# Patient Record
Sex: FEMALE | Race: WHITE | NOT HISPANIC OR LATINO | Employment: FULL TIME | URBAN - METROPOLITAN AREA
[De-identification: names, ages, dates, MRNs, and addresses within clinical notes are randomized per-mention and may not be internally consistent; named-entity substitution may affect disease eponyms.]

---

## 2017-02-08 ENCOUNTER — GENERIC CONVERSION - ENCOUNTER (OUTPATIENT)
Dept: OTHER | Facility: OTHER | Age: 57
End: 2017-02-08

## 2017-03-08 ENCOUNTER — ALLSCRIPTS OFFICE VISIT (OUTPATIENT)
Dept: OTHER | Facility: OTHER | Age: 57
End: 2017-03-08

## 2017-07-07 ENCOUNTER — LAB CONVERSION - ENCOUNTER (OUTPATIENT)
Dept: OTHER | Facility: OTHER | Age: 57
End: 2017-07-07

## 2017-07-07 LAB
A/G RATIO (HISTORICAL): 1.5 (ref 1.2–2.2)
ALBUMIN SERPL BCP-MCNC: 4 G/DL (ref 3.5–5.5)
ALP SERPL-CCNC: 59 IU/L (ref 39–117)
ALT SERPL W P-5'-P-CCNC: 21 IU/L (ref 0–32)
AST SERPL W P-5'-P-CCNC: 23 IU/L (ref 0–40)
BILIRUB SERPL-MCNC: <0.2 MG/DL (ref 0–1.2)
BUN SERPL-MCNC: 13 MG/DL (ref 6–24)
BUN/CREA RATIO (HISTORICAL): 17 (ref 9–23)
CALCIUM SERPL-MCNC: 9.2 MG/DL (ref 8.7–10.2)
CHLORIDE SERPL-SCNC: 103 MMOL/L (ref 96–106)
CHOLEST SERPL-MCNC: 224 MG/DL (ref 100–199)
CO2 SERPL-SCNC: 22 MMOL/L (ref 18–29)
CREAT SERPL-MCNC: 0.78 MG/DL (ref 0.57–1)
EGFR AFRICAN AMERICAN (HISTORICAL): 98 ML/MIN/1.73
EGFR-AMERICAN CALC (HISTORICAL): 85 ML/MIN/1.73
GLUCOSE SERPL-MCNC: 110 MG/DL (ref 65–99)
HDLC SERPL-MCNC: 61 MG/DL
LDLC SERPL CALC-MCNC: 139 MG/DL (ref 0–99)
POTASSIUM SERPL-SCNC: 4.2 MMOL/L (ref 3.5–5.2)
SODIUM SERPL-SCNC: 141 MMOL/L (ref 134–144)
TOT. GLOBULIN, SERUM (HISTORICAL): 2.6 G/DL (ref 1.5–4.5)
TOTAL PROTEIN (HISTORICAL): 6.6 G/DL (ref 6–8.5)
TRIGL SERPL-MCNC: 119 MG/DL (ref 0–149)
VLDLC SERPL CALC-MCNC: 24 MG/DL (ref 5–40)

## 2017-07-08 ENCOUNTER — GENERIC CONVERSION - ENCOUNTER (OUTPATIENT)
Dept: OTHER | Facility: OTHER | Age: 57
End: 2017-07-08

## 2017-07-08 LAB — TSH SERPL DL<=0.05 MIU/L-ACNC: 3.42 UIU/ML (ref 0.45–4.5)

## 2017-07-13 ENCOUNTER — ALLSCRIPTS OFFICE VISIT (OUTPATIENT)
Dept: OTHER | Facility: OTHER | Age: 57
End: 2017-07-13

## 2017-07-13 DIAGNOSIS — Z12.31 ENCOUNTER FOR SCREENING MAMMOGRAM FOR MALIGNANT NEOPLASM OF BREAST: ICD-10-CM

## 2017-07-28 ENCOUNTER — GENERIC CONVERSION - ENCOUNTER (OUTPATIENT)
Dept: OTHER | Facility: OTHER | Age: 57
End: 2017-07-28

## 2017-08-11 ENCOUNTER — HOSPITAL ENCOUNTER (EMERGENCY)
Facility: HOSPITAL | Age: 57
Discharge: HOME | End: 2017-08-11
Attending: EMERGENCY MEDICINE | Admitting: EMERGENCY MEDICINE
Payer: COMMERCIAL

## 2017-08-11 VITALS
BODY MASS INDEX: 35.61 KG/M2 | RESPIRATION RATE: 16 BRPM | HEIGHT: 63 IN | WEIGHT: 201 LBS | HEART RATE: 74 BPM | DIASTOLIC BLOOD PRESSURE: 90 MMHG | OXYGEN SATURATION: 99 % | TEMPERATURE: 98.4 F | SYSTOLIC BLOOD PRESSURE: 147 MMHG

## 2017-08-11 DIAGNOSIS — K21.9 GERD (GASTROESOPHAGEAL REFLUX DISEASE): Primary | ICD-10-CM

## 2017-08-11 DIAGNOSIS — I10 ELEVATED BLOOD PRESSURE READING WITH DIAGNOSIS OF HYPERTENSION: ICD-10-CM

## 2017-08-11 LAB
ALBUMIN SERPL BCP-MCNC: 3.4 G/DL (ref 3.5–5)
ALP SERPL-CCNC: 65 U/L (ref 46–116)
ALT SERPL W P-5'-P-CCNC: 33 U/L (ref 12–78)
ANION GAP SERPL CALCULATED.3IONS-SCNC: 12 MMOL/L (ref 4–13)
AST SERPL W P-5'-P-CCNC: 28 U/L (ref 5–45)
BASOPHILS # BLD AUTO: 0.03 THOUSANDS/ΜL (ref 0–0.1)
BASOPHILS NFR BLD AUTO: 0 % (ref 0–1)
BILIRUB SERPL-MCNC: 0.3 MG/DL (ref 0.2–1)
BUN SERPL-MCNC: 15 MG/DL (ref 5–25)
CALCIUM SERPL-MCNC: 9 MG/DL (ref 8.3–10.1)
CHLORIDE SERPL-SCNC: 106 MMOL/L (ref 100–108)
CO2 SERPL-SCNC: 23 MMOL/L (ref 21–32)
CREAT SERPL-MCNC: 0.86 MG/DL (ref 0.6–1.3)
EOSINOPHIL # BLD AUTO: 0.16 THOUSAND/ΜL (ref 0–0.61)
EOSINOPHIL NFR BLD AUTO: 2 % (ref 0–6)
ERYTHROCYTE [DISTWIDTH] IN BLOOD BY AUTOMATED COUNT: 14.7 % (ref 11.6–15.1)
GFR SERPL CREATININE-BSD FRML MDRD: 76 ML/MIN/1.73SQ M
GLUCOSE SERPL-MCNC: 125 MG/DL (ref 65–140)
HCT VFR BLD AUTO: 38.2 % (ref 34.8–46.1)
HGB BLD-MCNC: 12.4 G/DL (ref 11.5–15.4)
LIPASE SERPL-CCNC: 187 U/L (ref 73–393)
LYMPHOCYTES # BLD AUTO: 1.34 THOUSANDS/ΜL (ref 0.6–4.47)
LYMPHOCYTES NFR BLD AUTO: 18 % (ref 14–44)
MCH RBC QN AUTO: 26.2 PG (ref 26.8–34.3)
MCHC RBC AUTO-ENTMCNC: 32.5 G/DL (ref 31.4–37.4)
MCV RBC AUTO: 81 FL (ref 82–98)
MONOCYTES # BLD AUTO: 0.51 THOUSAND/ΜL (ref 0.17–1.22)
MONOCYTES NFR BLD AUTO: 7 % (ref 4–12)
NEUTROPHILS # BLD AUTO: 5.29 THOUSANDS/ΜL (ref 1.85–7.62)
NEUTS SEG NFR BLD AUTO: 73 % (ref 43–75)
PLATELET # BLD AUTO: 235 THOUSANDS/UL (ref 149–390)
PMV BLD AUTO: 9.5 FL (ref 8.9–12.7)
POTASSIUM SERPL-SCNC: 3.8 MMOL/L (ref 3.5–5.3)
PROT SERPL-MCNC: 7.5 G/DL (ref 6.4–8.2)
RBC # BLD AUTO: 4.73 MILLION/UL (ref 3.81–5.12)
SODIUM SERPL-SCNC: 141 MMOL/L (ref 136–145)
WBC # BLD AUTO: 7.33 THOUSAND/UL (ref 4.31–10.16)

## 2017-08-11 PROCEDURE — 96374 THER/PROPH/DIAG INJ IV PUSH: CPT

## 2017-08-11 PROCEDURE — 96361 HYDRATE IV INFUSION ADD-ON: CPT

## 2017-08-11 PROCEDURE — 80053 COMPREHEN METABOLIC PANEL: CPT | Performed by: EMERGENCY MEDICINE

## 2017-08-11 PROCEDURE — 99283 EMERGENCY DEPT VISIT LOW MDM: CPT

## 2017-08-11 PROCEDURE — 96375 TX/PRO/DX INJ NEW DRUG ADDON: CPT

## 2017-08-11 PROCEDURE — 83690 ASSAY OF LIPASE: CPT | Performed by: EMERGENCY MEDICINE

## 2017-08-11 PROCEDURE — C9113 INJ PANTOPRAZOLE SODIUM, VIA: HCPCS | Performed by: EMERGENCY MEDICINE

## 2017-08-11 PROCEDURE — 36415 COLL VENOUS BLD VENIPUNCTURE: CPT | Performed by: EMERGENCY MEDICINE

## 2017-08-11 PROCEDURE — 85025 COMPLETE CBC W/AUTO DIFF WBC: CPT | Performed by: EMERGENCY MEDICINE

## 2017-08-11 RX ORDER — LEVOTHYROXINE SODIUM 137 UG/1
137 TABLET ORAL
COMMUNITY
Start: 2015-11-06 | End: 2018-07-23

## 2017-08-11 RX ORDER — ALBUTEROL SULFATE 90 UG/1
108 AEROSOL, METERED RESPIRATORY (INHALATION) AS NEEDED
COMMUNITY
Start: 2016-02-01 | End: 2019-12-03 | Stop reason: SDUPTHER

## 2017-08-11 RX ORDER — ENALAPRIL MALEATE 10 MG/1
10 TABLET ORAL DAILY
COMMUNITY
End: 2018-07-23 | Stop reason: SDUPTHER

## 2017-08-11 RX ORDER — LABETALOL HYDROCHLORIDE 5 MG/ML
20 INJECTION, SOLUTION INTRAVENOUS ONCE
Status: COMPLETED | OUTPATIENT
Start: 2017-08-11 | End: 2017-08-11

## 2017-08-11 RX ORDER — METOPROLOL SUCCINATE 25 MG/1
50 TABLET, EXTENDED RELEASE ORAL
COMMUNITY
Start: 2015-11-06 | End: 2018-07-23 | Stop reason: SDUPTHER

## 2017-08-11 RX ORDER — PANTOPRAZOLE SODIUM 40 MG/1
40 INJECTION, POWDER, FOR SOLUTION INTRAVENOUS ONCE
Status: COMPLETED | OUTPATIENT
Start: 2017-08-11 | End: 2017-08-11

## 2017-08-11 RX ORDER — ZOLPIDEM TARTRATE 10 MG/1
10 TABLET ORAL AS NEEDED
COMMUNITY
Start: 2016-07-27 | End: 2018-07-23 | Stop reason: SDUPTHER

## 2017-08-11 RX ORDER — RANITIDINE 150 MG/1
150 TABLET ORAL 2 TIMES DAILY
Qty: 60 TABLET | Refills: 0 | Status: SHIPPED | OUTPATIENT
Start: 2017-08-11 | End: 2020-07-27 | Stop reason: ALTCHOICE

## 2017-08-11 RX ADMIN — PANTOPRAZOLE SODIUM 40 MG: 40 INJECTION, POWDER, FOR SOLUTION INTRAVENOUS at 08:34

## 2017-08-11 RX ADMIN — SODIUM CHLORIDE 1000 ML: 0.9 INJECTION, SOLUTION INTRAVENOUS at 08:34

## 2017-08-11 RX ADMIN — LABETALOL HYDROCHLORIDE 20 MG: 5 INJECTION, SOLUTION INTRAVENOUS at 08:32

## 2017-08-15 ENCOUNTER — ALLSCRIPTS OFFICE VISIT (OUTPATIENT)
Dept: OTHER | Facility: OTHER | Age: 57
End: 2017-08-15

## 2017-08-15 ENCOUNTER — GENERIC CONVERSION - ENCOUNTER (OUTPATIENT)
Dept: OTHER | Facility: OTHER | Age: 57
End: 2017-08-15

## 2017-08-25 DIAGNOSIS — R53.83 OTHER FATIGUE: ICD-10-CM

## 2017-08-28 ENCOUNTER — GENERIC CONVERSION - ENCOUNTER (OUTPATIENT)
Dept: OTHER | Facility: OTHER | Age: 57
End: 2017-08-28

## 2017-08-28 LAB
A/G RATIO (HISTORICAL): 1.6 (ref 1.2–2.2)
ALBUMIN SERPL BCP-MCNC: 3.9 G/DL (ref 3.5–5.5)
ALP SERPL-CCNC: 60 IU/L (ref 39–117)
ALT SERPL W P-5'-P-CCNC: 17 IU/L (ref 0–32)
AST SERPL W P-5'-P-CCNC: 21 IU/L (ref 0–40)
BASOPHILS # BLD AUTO: 0.1 X10E3/UL (ref 0–0.2)
BASOPHILS # BLD AUTO: 1 %
BILIRUB SERPL-MCNC: <0.2 MG/DL (ref 0–1.2)
BUN SERPL-MCNC: 13 MG/DL (ref 6–24)
BUN/CREA RATIO (HISTORICAL): 16 (ref 9–23)
CALCIUM SERPL-MCNC: 9.1 MG/DL (ref 8.7–10.2)
CHLORIDE SERPL-SCNC: 105 MMOL/L (ref 96–106)
CO2 SERPL-SCNC: 20 MMOL/L (ref 18–29)
CREAT SERPL-MCNC: 0.81 MG/DL (ref 0.57–1)
DEPRECATED RDW RBC AUTO: 14.9 % (ref 12.3–15.4)
EGFR AFRICAN AMERICAN (HISTORICAL): 94 ML/MIN/1.73
EGFR-AMERICAN CALC (HISTORICAL): 81 ML/MIN/1.73
EOSINOPHIL # BLD AUTO: 0.2 X10E3/UL (ref 0–0.4)
EOSINOPHIL # BLD AUTO: 4 %
ERYTHROCYTE SEDIMENTATION RATE (HISTORICAL): 28 MM/HR (ref 0–40)
GLUCOSE SERPL-MCNC: 117 MG/DL (ref 65–99)
HCT VFR BLD AUTO: 36.6 % (ref 34–46.6)
HGB BLD-MCNC: 11.9 G/DL (ref 11.1–15.9)
IMM.GRANULOCYTES (CD4/8) (HISTORICAL): 0 %
IMM.GRANULOCYTES (CD4/8) (HISTORICAL): 0 X10E3/UL (ref 0–0.1)
LYMPHOCYTES # BLD AUTO: 1.7 X10E3/UL (ref 0.7–3.1)
LYMPHOCYTES # BLD AUTO: 29 %
MCH RBC QN AUTO: 26.4 PG (ref 26.6–33)
MCHC RBC AUTO-ENTMCNC: 32.5 G/DL (ref 31.5–35.7)
MCV RBC AUTO: 81 FL (ref 79–97)
MONOCYTES # BLD AUTO: 0.5 X10E3/UL (ref 0.1–0.9)
MONOCYTES (HISTORICAL): 8 %
NEUTROPHILS # BLD AUTO: 3.5 X10E3/UL (ref 1.4–7)
NEUTROPHILS # BLD AUTO: 58 %
PLATELET # BLD AUTO: 216 X10E3/UL (ref 150–379)
POTASSIUM SERPL-SCNC: 4.1 MMOL/L (ref 3.5–5.2)
RBC (HISTORICAL): 4.51 X10E6/UL (ref 3.77–5.28)
SODIUM SERPL-SCNC: 142 MMOL/L (ref 134–144)
TOT. GLOBULIN, SERUM (HISTORICAL): 2.5 G/DL (ref 1.5–4.5)
TOTAL PROTEIN (HISTORICAL): 6.4 G/DL (ref 6–8.5)
WBC # BLD AUTO: 6 X10E3/UL (ref 3.4–10.8)

## 2017-08-29 ENCOUNTER — GENERIC CONVERSION - ENCOUNTER (OUTPATIENT)
Dept: OTHER | Facility: OTHER | Age: 57
End: 2017-08-29

## 2017-08-29 LAB
C-REACT.PROTEIN,QUANT (HISTORICAL): 0.8 MG/L (ref 0–4.9)
TSH SERPL DL<=0.05 MIU/L-ACNC: 3.68 UIU/ML (ref 0.45–4.5)

## 2017-08-30 ENCOUNTER — GENERIC CONVERSION - ENCOUNTER (OUTPATIENT)
Dept: OTHER | Facility: OTHER | Age: 57
End: 2017-08-30

## 2017-09-02 ENCOUNTER — HOSPITAL ENCOUNTER (OUTPATIENT)
Dept: ULTRASOUND IMAGING | Facility: HOSPITAL | Age: 57
Discharge: HOME | End: 2017-09-02
Payer: COMMERCIAL

## 2017-09-02 DIAGNOSIS — R53.83 OTHER FATIGUE: ICD-10-CM

## 2017-09-02 PROCEDURE — 76700 US EXAM ABDOM COMPLETE: CPT

## 2017-09-05 ENCOUNTER — GENERIC CONVERSION - ENCOUNTER (OUTPATIENT)
Dept: OTHER | Facility: OTHER | Age: 57
End: 2017-09-05

## 2017-09-11 ENCOUNTER — GENERIC CONVERSION - ENCOUNTER (OUTPATIENT)
Dept: OTHER | Facility: OTHER | Age: 57
End: 2017-09-11

## 2017-09-25 ENCOUNTER — ALLSCRIPTS OFFICE VISIT (OUTPATIENT)
Dept: OTHER | Facility: OTHER | Age: 57
End: 2017-09-25

## 2017-09-26 ENCOUNTER — GENERIC CONVERSION - ENCOUNTER (OUTPATIENT)
Dept: OTHER | Facility: OTHER | Age: 57
End: 2017-09-26

## 2017-09-26 LAB
BASOPHILS # BLD AUTO: 0 X10E3/UL (ref 0–0.2)
BASOPHILS # BLD AUTO: 1 %
DEPRECATED RDW RBC AUTO: 15.3 % (ref 12.3–15.4)
EOSINOPHIL # BLD AUTO: 0.2 X10E3/UL (ref 0–0.4)
EOSINOPHIL # BLD AUTO: 2 %
HCT VFR BLD AUTO: 36.6 % (ref 34–46.6)
HGB BLD-MCNC: 12.2 G/DL (ref 11.1–15.9)
IMM.GRANULOCYTES (CD4/8) (HISTORICAL): 0 %
IMM.GRANULOCYTES (CD4/8) (HISTORICAL): 0 X10E3/UL (ref 0–0.1)
LYMPHOCYTES # BLD AUTO: 1.4 X10E3/UL (ref 0.7–3.1)
LYMPHOCYTES # BLD AUTO: 21 %
MCH RBC QN AUTO: 27.2 PG (ref 26.6–33)
MCHC RBC AUTO-ENTMCNC: 33.3 G/DL (ref 31.5–35.7)
MCV RBC AUTO: 82 FL (ref 79–97)
MONOCYTES # BLD AUTO: 0.4 X10E3/UL (ref 0.1–0.9)
MONOCYTES (HISTORICAL): 5 %
NEUTROPHILS # BLD AUTO: 4.8 X10E3/UL (ref 1.4–7)
NEUTROPHILS # BLD AUTO: 71 %
PLATELET # BLD AUTO: 224 X10E3/UL (ref 150–379)
RBC (HISTORICAL): 4.48 X10E6/UL (ref 3.77–5.28)
WBC # BLD AUTO: 6.8 X10E3/UL (ref 3.4–10.8)

## 2017-09-27 ENCOUNTER — GENERIC CONVERSION - ENCOUNTER (OUTPATIENT)
Dept: OTHER | Facility: OTHER | Age: 57
End: 2017-09-27

## 2017-09-27 LAB
ANTI-NUCLEAR ANTIBODY (ANA) (HISTORICAL): NEGATIVE
HEPATITIS A IGM ANTIBODY (HISTORICAL): NEGATIVE
HEPATITIS B CORE IGM ANTIBODY (HISTORICAL): NEGATIVE
HEPATITIS B SURFACE ANTIGEN (HISTORICAL): NEGATIVE
HEPATITIS C ANTIBODY (HISTORICAL): <0.1 S/CO RATIO (ref 0–0.9)

## 2017-09-28 ENCOUNTER — GENERIC CONVERSION - ENCOUNTER (OUTPATIENT)
Dept: OTHER | Facility: OTHER | Age: 57
End: 2017-09-28

## 2017-09-28 LAB
HIV 1 RNA QUALITATIVE (HISTORICAL): NEGATIVE
HIV-1 ANTIBODY (HISTORICAL): NEGATIVE
HIV-2 ANTIBODY (HISTORICAL): NEGATIVE
INTERPRETATION (HISTORICAL): NORMAL
INTERPRETATION (HISTORICAL): NORMAL

## 2017-09-29 ENCOUNTER — GENERIC CONVERSION - ENCOUNTER (OUTPATIENT)
Dept: OTHER | Facility: OTHER | Age: 57
End: 2017-09-29

## 2017-10-13 ENCOUNTER — HOSPITAL ENCOUNTER (OUTPATIENT)
Dept: BONE DENSITY | Facility: IMAGING CENTER | Age: 57
Discharge: HOME | End: 2017-10-13
Payer: COMMERCIAL

## 2017-10-13 DIAGNOSIS — Z12.31 ENCOUNTER FOR SCREENING MAMMOGRAM FOR MALIGNANT NEOPLASM OF BREAST: ICD-10-CM

## 2017-10-13 PROCEDURE — G0202 SCR MAMMO BI INCL CAD: HCPCS

## 2017-12-01 ENCOUNTER — GENERIC CONVERSION - ENCOUNTER (OUTPATIENT)
Dept: OTHER | Facility: OTHER | Age: 57
End: 2017-12-01

## 2017-12-02 LAB — TSH SERPL DL<=0.05 MIU/L-ACNC: 0.86 UIU/ML (ref 0.45–4.5)

## 2017-12-03 ENCOUNTER — GENERIC CONVERSION - ENCOUNTER (OUTPATIENT)
Dept: OTHER | Facility: OTHER | Age: 57
End: 2017-12-03

## 2017-12-19 ENCOUNTER — TRANSCRIBE ORDERS (OUTPATIENT)
Dept: ADMINISTRATIVE | Facility: HOSPITAL | Age: 57
End: 2017-12-19

## 2017-12-19 DIAGNOSIS — M54.16 LUMBAR RADICULOPATHY: Primary | ICD-10-CM

## 2017-12-26 ENCOUNTER — HOSPITAL ENCOUNTER (OUTPATIENT)
Dept: MRI IMAGING | Facility: HOSPITAL | Age: 57
Discharge: HOME | End: 2017-12-26
Payer: COMMERCIAL

## 2017-12-26 ENCOUNTER — GENERIC CONVERSION - ENCOUNTER (OUTPATIENT)
Dept: OTHER | Facility: OTHER | Age: 57
End: 2017-12-26

## 2017-12-26 DIAGNOSIS — M54.16 LUMBAR RADICULOPATHY: ICD-10-CM

## 2017-12-26 PROCEDURE — 72148 MRI LUMBAR SPINE W/O DYE: CPT

## 2018-01-09 NOTE — RESULT NOTES
Verified Results  (1) CBC/PLT/DIFF [id number] 07:11AM Intell[facility]     Test Name Result Flag Reference   WBC 6 0 x10E3/uL  3 4-10 8   RBC 4 51 x10E6/uL  3 77-5 28   Hemoglobin 11 9 g/dL  11 1-15 9   Hematocrit 36 6 %  34 0-46  6   MCV 81 fL  79-97   MCH 26 4 pg L 26 6-33 0   MCHC 32 5 g/dL  31 5-35 7   RDW 14 9 %  12 3-15 4   Platelets 262 Z24R5/ZT  150-379   Neutrophils 58 %     Lymphs 29 %     Monocytes 8 %     Eos 4 %     Basos 1 %     Neutrophils (Absolute) 3 5 x10E3/uL  1 4-7 0   Lymphs (Absolute) 1 7 x10E3/uL  0 7-3 1   Monocytes(Absolute) 0 5 x10E3/uL  0 1-0 9   Eos (Absolute) 0 2 x10E3/uL  0 0-0 4   Baso (Absolute) 0 1 x10E3/uL  0 0-0 2   Immature Granulocytes 0 %     Immature Grans (Abs) 0 0 x10E3/uL  0 0-0 1   WBC 6 0 x10E3/uL  3 4-10 8   RBC 4 51 x10E6/uL  3 77-5 28   Hemoglobin 11 9 g/dL  11 1-15 9   Hematocrit 36 6 %  34 0-46  6   MCV 81 fL  79-97   MCH 26 4 pg L 26 6-33 0   MCHC 32 5 g/dL  31 5-35 7   RDW 14 9 %  12 3-15 4   Platelets 737 M72Z9/ES  150-379   Neutrophils 58 %     Lymphs 29 %     Monocytes 8 %     Eos 4 %     Basos 1 %     Neutrophils (Absolute) 3 5 x10E3/uL  1 4-7 0   Lymphs (Absolute) 1 7 x10E3/uL  0 7-3 1   Monocytes(Absolute) 0 5 x10E3/uL  0 1-0 9   Eos (Absolute) 0 2 x10E3/uL  0 0-0 4   Baso (Absolute) 0 1 x10E3/uL  0 0-0 2   Immature Granulocytes 0 %     Immature Grans (Abs) 0 0 x10E3/uL  0 0-0 1           (1) COMPREHENSIVE METABOLIC PANEL [id number] 79:78JH Intell[facility]     Test Name Result Flag Reference   Glucose, Serum 117 mg/dL H 65-99   BUN 13 mg/dL  6-24   Creatinine, Serum 0 81 mg/dL  0 57-1 00   BUN/Creatinine Ratio 16  9-23   Sodium, Serum 142 mmol/L  134-144   Potassium, Serum 4 1 mmol/L  3 5-5 2   Chloride, Serum 105 mmol/L     Carbon Dioxide, Total 20 mmol/L  18-29   Calcium, Serum 9 1 mg/dL  8 7-10 2   Protein, Total, Serum 6 4 g/dL  6 0-8 5   Albumin, Serum 3 9 g/dL  3 5-5 5   Globulin, Total 2 5 g/dL  1 5-4 5   A/G Ratio 1 6  1 2-2 2   Bilirubin, Total <0 2 mg/dL  0 0-1 2   Alkaline Phosphatase, S 60 IU/L     AST (SGOT) 21 IU/L  0-40   ALT (SGPT) 17 IU/L  0-32   eGFR If NonAfricn Am 81 mL/min/1 73  >59   eGFR If Africn Am 94 mL/min/1 73  >59   Glucose, Serum 117 mg/dL H 65-99   BUN 13 mg/dL  6-24   Creatinine, Serum 0 81 mg/dL  0 57-1 00   BUN/Creatinine Ratio 16  9-23   Sodium, Serum 142 mmol/L  134-144   Potassium, Serum 4 1 mmol/L  3 5-5 2   Chloride, Serum 105 mmol/L     Carbon Dioxide, Total 20 mmol/L  18-29   Calcium, Serum 9 1 mg/dL  8 7-10 2   Protein, Total, Serum 6 4 g/dL  6 0-8 5   Albumin, Serum 3 9 g/dL  3 5-5 5   Globulin, Total 2 5 g/dL  1 5-4 5   A/G Ratio 1 6  1 2-2 2   Bilirubin, Total <0 2 mg/dL  0 0-1 2   Alkaline Phosphatase, S 60 IU/L     AST (SGOT) 21 IU/L  0-40   ALT (SGPT) 17 IU/L  0-32   eGFR If NonAfricn Am 81 mL/min/1 73  >59   eGFR If Africn Am 94 mL/min/1 73  >59           (1) C-REACTIVE PROTEIN [id number] 07:11AM Lucie Bullocks     Test Name Result Flag Reference   C-Reactive Protein, Quant 0 8 mg/L  0 0-4 9                 (LC) Sedimentation Rate-Westergren [id number] 07:11AM Lucie Bullocks     Test Name Result Flag Reference   Sedimentation Rate-Westergren 28 mm/hr  0-40

## 2018-01-10 NOTE — RESULT NOTES
Verified Results  (1) COMPREHENSIVE METABOLIC PANEL [id number] 44:43ZU [employer]     Test Name Result Flag Reference   Glucose, Serum 110 mg/dL H 65-99   BUN 13 mg/dL  6-24   Creatinine, Serum 0 78 mg/dL  0 57-1 00   BUN/Creatinine Ratio 17  9-23   Sodium, Serum 141 mmol/L  134-144   Potassium, Serum 4 2 mmol/L  3 5-5 2   Chloride, Serum 103 mmol/L     Carbon Dioxide, Total 22 mmol/L  18-29   Calcium, Serum 9 2 mg/dL  8 7-10 2   Protein, Total, Serum 6 6 g/dL  6 0-8 5   Albumin, Serum 4 0 g/dL  3 5-5 5   Globulin, Total 2 6 g/dL  1 5-4 5   A/G Ratio 1 5  1 2-2 2   Bilirubin, Total <0 2 mg/dL  0 0-1 2   Alkaline Phosphatase, S 59 IU/L     AST (SGOT) 23 IU/L  0-40   ALT (SGPT) 21 IU/L  0-32   eGFR If NonAfricn Am 85 mL/min/1 73  >59   eGFR If Africn Am 98 mL/min/1 73  >59   Glucose, Serum 110 mg/dL H 65-99   BUN 13 mg/dL  6-24   Creatinine, Serum 0 78 mg/dL  0 57-1 00   BUN/Creatinine Ratio 17  9-23   Sodium, Serum 141 mmol/L  134-144   Potassium, Serum 4 2 mmol/L  3 5-5 2   Chloride, Serum 103 mmol/L     Carbon Dioxide, Total 22 mmol/L  18-29   Calcium, Serum 9 2 mg/dL  8 7-10 2   Protein, Total, Serum 6 6 g/dL  6 0-8 5   Albumin, Serum 4 0 g/dL  3 5-5 5   Globulin, Total 2 6 g/dL  1 5-4 5   A/G Ratio 1 5  1 2-2 2   Bilirubin, Total <0 2 mg/dL  0 0-1 2   Alkaline Phosphatase, S 59 IU/L     AST (SGOT) 23 IU/L  0-40   ALT (SGPT) 21 IU/L  0-32   eGFR If NonAfricn Am 85 mL/min/1 73  >59   eGFR If Africn Am 98 mL/min/1 73  >59           (LC) Lipid Panel [id number] 08:10AM Meryl Picket     Test Name Result Flag Reference   Cholesterol, Total 224 mg/dL H 100-199   Triglycerides 119 mg/dL  0-149   HDL Cholesterol 61 mg/dL  >39   VLDL Cholesterol Doni 24 mg/dL  5-40   LDL Cholesterol Calc 139 mg/dL H 0-99   Cholesterol, Total 224 mg/dL H 100-199   Triglycerides 119 mg/dL  0-149   HDL Cholesterol 61 mg/dL  >39   VLDL Cholesterol Doni 24 mg/dL  5-40   LDL Cholesterol Calc 139 mg/dL H 0-99 (1) TSH [id number] 08:10AM Amy Kay     Test Name Result Flag Reference   TSH 3 420 uIU/mL  0 450-4 500

## 2018-01-11 NOTE — RESULT NOTES
Verified Results  * US ABDOMEN COMPLETE 02Sep2017 10:52AM Derrick Schmid Order Number: [id number]    - Patient Instructions: To schedule this appointment, please contact Central Scheduling at [phone]  Test Name Result Flag Reference   US ABDOMEN COMPLETE (Report)     ABDOMEN ULTRASOUND, COMPLETE     INDICATION: Upper abdominal pain for 3 weeks  Fatigue  COMPARISON: None available  If images from outside prior examination become available for direct comparison, an addendum will placed on this report  TECHNIQUE:  Real-time ultrasound of the abdomen was performed with a curvilinear transducer with both volumetric sweeps and still imaging techniques  FINDINGS:     PANCREAS: Visualized portions of the pancreas are within normal limits  AORTA AND IVC: Visualized portions are normal for patient age  LIVER:   Size: Within normal range  The liver measures 15 6 cm in the midclavicular line  Contour: Surface contour is smooth  Parenchyma: Echogenicity and echotexture are within normal limits  No evidence of suspicious mass  The main portal vein is patent and hepatopetal       BILIARY:   Gallbladder is surgically absent  No intrahepatic biliary dilatation  CBD measures 4 mm  No choledocholithiasis  KIDNEY:    Right kidney measures 9 6 cm  Within normal limits  Left kidney measures 10 2 cm  Within normal limits  SPLEEN:    Measures 11 3 cm  Within normal limits  ASCITES: None  IMPRESSION:     Unremarkable examination  No sonographic abnormality to account for the patient's symptoms         Workstation performed: [id number]     Signed by:   Loren Engle MD   9/5/17

## 2018-01-11 NOTE — RESULT NOTES
Verified Results  * MAMMO SCREENING BILATERAL W CAD [id number] 12:59PM Jason Napoles Order Number: [id number]    - Patient Instructions: To schedule this appointment, please contact Central Scheduling at [phone]  Do not wear any perfume, powder, lotion or deodorant on breast or underarm area  Please bring your doctors order, referral (if needed) and insurance information with you on the day of the test  Failure to bring this information may result in this test being rescheduled  Arrive 15 minutes prior to your appointment time to register  On the day of your test, please bring any prior mammogram or breast studies with you that were not performed at a [facility]  Failure to bring prior exams may result in your test needing to be rescheduled   Order Number: [id number]    - Patient Instructions: To schedule this appointment, please contact Central Scheduling at [phone]  Do not wear any perfume, powder, lotion or deodorant on breast or underarm area  Please bring your doctors order, referral (if needed) and insurance information with you on the day of the test  Failure to bring this information may result in this test being rescheduled  Arrive 15 minutes prior to your appointment time to register  On the day of your test, please bring any prior mammogram or breast studies with you that were not performed at a [facility]  Failure to bring prior exams may result in your test needing to be rescheduled  Test Name Result Flag Reference   MAMMO SCREENING BILATERAL W CAD (Report)     Patient History:   Family history of pancreatic cancer in mother at age 48 or over  Patient is a former smoker  Patient's BMI is 35 5  Reason for exam: screening (asymptomatic)  Mammo Screening Bilateral W CAD: October 3, 2016 - Check In #:    [de-identified]   Bilateral MLO, CC, and XCCL view(s) were taken       Technologist: KATHERINE Harvey (R)(M)   Prior study comparison: September 29, 2015, bilateral OPMA    digital scrn mammo w/CAD performed at [address] [facility]  August 26, 2014, bilateral OPMA digital    scrn mammo w/CAD performed at [address] [facility]  June 20, 2013, left breast unilateral diagnostic   mammogram, performed at [address] [place] [address]  June 13, 2013, bilateral OPMA digital scrn mammo w/CAD performed at    [address] [facility]  April 19, 2012,    bilateral OPMA digital scrn mammo w/CAD performed at [address] [facility]  February 17, 2011, bilateral    OPMA digital scrn mammo w/CAD performed at [address] [facility]  The breast tissue is almost entirely fat  No dominant soft tissue mass, architectural distortion or    suspicious calcifications are noted in either breast  The skin    and nipple structures are within normal limits  Scattered benign   appearing calcifications are noted  No mammographic evidence of malignancy  No significant changes when compared with prior studies  ASSESSMENT: BiRad:1 - Negative     Recommendation:   Routine screening mammogram of both breasts in 1 year  A    reminder letter will be scheduled  Analyzed by CAD     8-10% of cancers will be missed on mammography  Management of a    palpable abnormality must be based on clinical grounds  Patients   will be notified of their results via letter from our facility  Accredited by [employer] and FDA       Transcription Location: KATHERINE Estrella 98: [id number]     Risk Value(s):   Tyrer-Cuzick 10 Year: 2 560%, Tyrer-Cuzick Lifetime: 8 521%,    Myriad Table: 1 5%, RAFY 5 Year: 1 3%, NCI Lifetime: 9 1%   Signed by:   Konrad Acevedo MD   10/3/16

## 2018-01-11 NOTE — RESULT NOTES
Verified Results  ([address] [facility]) HIV-1/2 With Reflex (Multispot®) [id number] 10:09AM Raghu Powers     Test Name Result Flag Reference   HIV 1 Ab Negative  Negative   HIV 2 Ab Negative  Negative   HIV 1 RNA Qualitative Negative  Negative   Negative for HIV-1 RNA   Interpretation: Comment     Negative for HIV-1 and HIV-2 antibodies  See RNA Reflex  Final Interpretation Comment     HIV antibodies were not confirmed and HIV 1 RNA was not detected  No  laboratory evidence of HIV 1 infection  Follow-up testing for HIV 2  should be performed if clinically indicated

## 2018-01-11 NOTE — PROGRESS NOTES
Assessment    1  Hypertension (401 9) (I10)   2  GERD without esophagitis (530 81) (K21 9)    Plan  Hypertension    · Enalapril Maleate 10 MG Oral Tablet; TAKE 1 TABLET DAILY    Discussion/Summary    Increase Vasotec  Continue omeprazole  next labs in July- CMP, A1C, TSH, Lipid- after 7/16/17- call for order  Chief Complaint  ELEVATED BP READINGS, CONSTANT HEADACHE    DUE FOR DM FOOT EXAM  History of Present Illness  HPI: Patient having some history of elevated blood pressures over the last several weeks  Usually run in the 150-160/90-95  Assessment having a mild headache associated with this  No other cardiac symptoms  No visual disturbances  GERD-patient has some increased belching and epigastric discomfort  She did stop her omeprazole several weeks ago  No solid foods dysphagia  Review of Systems    Constitutional: no fever, not feeling poorly, no chills and not feeling tired  Cardiovascular: the heart rate was not slow, no chest pain, the heart rate was not fast and no palpitations  Respiratory: no shortness of breath, no cough, no wheezing and no shortness of breath during exertion  Gastrointestinal: nausea, but no abdominal pain and no vomiting  Neurological: headache, but no numbness, no confusion and no dizziness  Active Problems    1  Asthma (493 90) (J45 909)   2  Elevated glucose (790 29) (R73 09)   3  Encounter for other plastic or reconstructive surgery following medical procedure or   healed injury (V51 8) (Z42 8)   4  Encounter for screening colonoscopy (V76 51) (Z12 11)   5  Encounter for screening fecal occult blood testing (V76 51) (Z12 11)   6  Hypertension (401 9) (I10)   7  Hypothyroidism (244 9) (E03 9)   8  Metastatic Calcification Of The Skin (709 3)   9  Need for lipid screening (V77 91) (Z13 220)   10  Other screening mammogram (V76 12) (Z12 31)   11  History of Panniculectomy   12  Prolonged QT syndrome (426 82) (I45 81)    Past Medical History    1  Encounter for other plastic or reconstructive surgery following medical procedure or   healed injury (V51 8) (Z42 8)  Active Problems And Past Medical History Reviewed: The active problems and past medical history were reviewed and updated today  Family History  Family History Reviewed: The family history was reviewed and updated today  Social History    · Never A Smoker  The social history was reviewed and updated today  Surgical History    1  History of  Section   2  History of Cholecystectomy   3  History of Hysterectomy   4  History of Panniculectomy    Current Meds   1  Enalapril Maleate 5 MG Oral Tablet; TAKE 1 TABLET DAILY  Requested for: 62Tuk4755;   Last Rx:96Nli7539 Ordered   2  Levothyroxine Sodium 125 MCG Oral Tablet; take 1 tablet every other day; Therapy: 95IDB8450 to (Last Rx:42Lbd1663)  Requested for: 03Jjw6212 Ordered   3  Levothyroxine Sodium 137 MCG Oral Tablet; take 1 tablet every other day; Therapy: 04KLQ7185 to (Last Rx:66Cbk6501)  Requested for: 91Znc4195 Ordered   4  Metoprolol Succinate ER 25 MG Oral Tablet Extended Release 24 Hour; Take 1 tablet   daily; Therapy: 32ZLH3404 to (Last Rx:84Yqf8018)  Requested for: 05Swi8718 Ordered   5  ProAir  (90 Base) MCG/ACT Inhalation Aerosol Solution; inhale 2 puffs every 4   hours as needed; Therapy: 29VJS2966 to (Last Rx:38Zvo9707)  Requested for: 52FKR3937 Ordered   6  Zolpidem Tartrate 10 MG Oral Tablet; TAKE 1 TABLET AT  BEDTIME AS NEEDED FOR   INSOMNIA; Therapy: 35Fde1458 to (Evaluate:93Ljk8635); Last Rx:72Zqn3185 Ordered    The medication list was reviewed and updated today  Allergies    1   No Known Drug Allergies    Vitals   Recorded: 61VQE9437 08:54AM Recorded: 76AZS0565 08:24AM   Heart Rate 72 90   Respiration  16   Systolic 127,    Diastolic 92, RUE 90   Height  5 ft 4 in   Weight  207 lb    BMI Calculated  35 53   BSA Calculated  1 99   O2 Saturation  99     Physical Exam    Constitutional   General appearance: No acute distress, well appearing and well nourished  Eyes   Conjunctiva and lids: No swelling, erythema or discharge  Pupils and irises: Equal, round and reactive to light  Ears, Nose, Mouth, and Throat   Nasal mucosa, septum, and turbinates: Normal without edema or erythema  Oropharynx: Normal with no erythema, edema, exudate or lesions  Pulmonary   Respiratory effort: No increased work of breathing or signs of respiratory distress  Auscultation of lungs: Clear to auscultation  no rales or crackles were heard bilaterally  no rhonchi  no wheezing  Cardiovascular   Auscultation of heart: Normal rate and rhythm, normal S1 and S2, without murmurs  Examination of extremities for edema and/or varicosities: Normal     Carotid pulses: Normal     Abdomen   Abdomen: Abnormal   mild epigastric tenderness, no mass or rebound tenderness  Liver and spleen: No hepatomegaly or splenomegaly  Lymphatic   Palpation of lymph nodes in neck: No lymphadenopathy           Signatures   Electronically signed by : Justen Simental MD; Mar  8 2017  4:51PM EST                       (Author)

## 2018-01-12 VITALS
WEIGHT: 207 LBS | RESPIRATION RATE: 16 BRPM | OXYGEN SATURATION: 99 % | BODY MASS INDEX: 35.34 KG/M2 | HEIGHT: 64 IN | HEART RATE: 72 BPM | SYSTOLIC BLOOD PRESSURE: 130 MMHG | DIASTOLIC BLOOD PRESSURE: 92 MMHG

## 2018-01-12 NOTE — RESULT NOTES
Verified Results  (1) CBC/PLT/DIFF [id number] 10:09AM Ethan Favia     Test Name Result Flag Reference   WBC 6 8 x10E3/uL  3 4-10 8   RBC 4 48 x10E6/uL  3 77-5 28   Hemoglobin 12 2 g/dL  11 1-15 9   Hematocrit 36 6 %  34 0-46  6   MCV 82 fL  79-97   MCH 27 2 pg  26 6-33 0   MCHC 33 3 g/dL  31 5-35 7   RDW 15 3 %  12 3-15 4   Platelets 084 B95T4/VF  150-379   Neutrophils 71 %     Lymphs 21 %     Monocytes 5 %     Eos 2 %     Basos 1 %     Neutrophils (Absolute) 4 8 x10E3/uL  1 4-7 0   Lymphs (Absolute) 1 4 x10E3/uL  0 7-3 1   Monocytes(Absolute) 0 4 x10E3/uL  0 1-0 9   Eos (Absolute) 0 2 x10E3/uL  0 0-0 4   Baso (Absolute) 0 0 x10E3/uL  0 0-0 2   Immature Granulocytes 0 %     Immature Grans (Abs) 0 0 x10E3/uL  0 0-0 1     (1) ACUTE HEPATITIS PANEL 47Zpr5455 10:09AM Ethan Favia     Test Name Result Flag Reference   Hep A Ab, IgM Negative  Negative   HBsAg Screen Negative  Negative   Hep B Core Ab, IgM Negative  Negative   Hep C Virus Ab <0 1 s/co ratio  0 0-0 9   Negative:     < 0 8                                              Indeterminate: 0 8 - 0 9                                                   Positive:     > 0 9                  The CDC recommends that a positive HCV antibody result                  be followed up with a HCV Nucleic Acid Amplification                  test (231305)

## 2018-01-13 VITALS
HEIGHT: 64 IN | BODY MASS INDEX: 35.17 KG/M2 | DIASTOLIC BLOOD PRESSURE: 100 MMHG | WEIGHT: 206 LBS | RESPIRATION RATE: 16 BRPM | SYSTOLIC BLOOD PRESSURE: 160 MMHG | HEART RATE: 78 BPM | OXYGEN SATURATION: 95 %

## 2018-01-13 VITALS
SYSTOLIC BLOOD PRESSURE: 150 MMHG | WEIGHT: 205 LBS | HEIGHT: 63 IN | BODY MASS INDEX: 36.32 KG/M2 | OXYGEN SATURATION: 99 % | HEART RATE: 84 BPM | DIASTOLIC BLOOD PRESSURE: 100 MMHG | RESPIRATION RATE: 16 BRPM

## 2018-01-14 NOTE — RESULT NOTES
Verified Results  (1) COMPREHENSIVE METABOLIC PANEL [id number] 09:21KM Kelise Gaston     Test Name Result Flag Reference   Glucose, Serum 125 mg/dL H 65-99   BUN 14 mg/dL  6-24   Creatinine, Serum 0 77 mg/dL  0 57-1 00   eGFR If NonAfricn Am 87 mL/min/1 73  >59   eGFR If Africn Am 101 mL/min/1 73  >59   BUN/Creatinine Ratio 18  9-23   Sodium, Serum 141 mmol/L  134-144   Potassium, Serum 4 0 mmol/L  3 5-5 2   Chloride, Serum 104 mmol/L     Carbon Dioxide, Total 21 mmol/L  18-29   Calcium, Serum 9 0 mg/dL  8 7-10 2   Protein, Total, Serum 6 7 g/dL  6 0-8 5   Albumin, Serum 3 8 g/dL  3 5-5 5   Globulin, Total 2 9 g/dL  1 5-4 5   A/G Ratio 1 3  1 1-2 5   Bilirubin, Total 0 3 mg/dL  0 0-1 2   Alkaline Phosphatase, S 60 IU/L     AST (SGOT) 26 IU/L  0-40   ALT (SGPT) 24 IU/L  0-32     (1) LIPID PANEL, FASTING [id number] 12:00AM [employer]     Test Name Result Flag Reference   Cholesterol, Total 200 mg/dL H 100-199   Triglycerides 155 mg/dL H 0-149   HDL Cholesterol 56 mg/dL  >39   According to ATP-III Guidelines, HDL-C >59 mg/dL is considered a  negative risk factor for CHD  VLDL Cholesterol Doni 31 mg/dL  5-40   LDL Cholesterol Calc 113 mg/dL H 0-99   T  Chol/HDL Ratio 3 6 ratio units  0 0-4 4   T  Chol/HDL Ratio                                                             Men  Women                                               1/2 Avg  Risk  3 4    3 3                                                   Avg Risk  5 0    4 4                                                2X Avg  Risk  9 6    7 1                                                3X Avg  Risk 23 4   11 0     (1) TSH 70YSK9501 12:00AM [employer]     Test Name Result Flag Reference   TSH 0 686 uIU/mL  0 450-4 500     [facility]) Thyroxine (T4) Free, Direct, S [id number] 12:00AM [employer]     Test Name Result Flag Reference   T4,Free(Direct) 1 33 ng/dL  0 82-1 77     (1) HEMOGLOBIN A1C [id number] 12:00AM Alinda Gaston     Test Name Result Flag Reference   Hemoglobin A1c 5 9 % H 4 8-5 6   Pre-diabetes: 5 7 - 6 4           Diabetes: >6 4           Glycemic control for adults with diabetes: <7 0     (1) T3 TOTAL [id number] 12:00AM [employer]     Test Name Result Flag Reference   Triiodothyronine (T3) 103 ng/dL       (LC) Please note [id number] 12:00AM [employer]     Test Name Result Flag Reference   Please note Comment     The date and/or time of collection was not indicated on the  requisition as required by state and federal law  The date of  receipt of the specimen was used as the collection date if not  supplied

## 2018-01-15 NOTE — MISCELLANEOUS
[facility]  [phone]      Dear Tara Heath: Your primary care doctor referred you to see one of our doctors at [facility]  We have made two attempts to contact you to schedule you for an appointment in our office without any success  If you still wish to  schedule an appointment with us, please call our office at [phone]      Sincerely,  [facility]        Electronically signed by:Melissa Bell   Jul 28 2017  5:25PM EST Author

## 2018-01-16 NOTE — RESULT NOTES
Verified Results  [facility]) JUANITA w/Reflex [id number] 10:09AM Ti Carrington     Test Name Result Flag Reference   JUANITA Direct Negative  Negative

## 2018-01-17 NOTE — RESULT NOTES
Verified Results  (1) TSH [id number] 07:14AM Emir Sensor     Test Name Result Flag Reference   TSH 3 680 uIU/mL  0 450-4 500

## 2018-01-22 VITALS
HEART RATE: 82 BPM | HEIGHT: 63 IN | TEMPERATURE: 98.7 F | OXYGEN SATURATION: 98 % | BODY MASS INDEX: 36.68 KG/M2 | RESPIRATION RATE: 16 BRPM | WEIGHT: 207 LBS | DIASTOLIC BLOOD PRESSURE: 90 MMHG | SYSTOLIC BLOOD PRESSURE: 150 MMHG

## 2018-01-23 NOTE — RESULT NOTES
Verified Results  (1) TSH [id number] 06:54AM Prasanth Oh     Test Name Result Flag Reference   TSH 0 856 uIU/mL  0 450-4 500

## 2018-05-07 ENCOUNTER — TELEPHONE (OUTPATIENT)
Dept: FAMILY MEDICINE CLINIC | Facility: CLINIC | Age: 58
End: 2018-05-07

## 2018-05-07 NOTE — TELEPHONE ENCOUNTER
Olimpia Bartlett Brewin    appt 5/15/18  BZR--[id number]  Dx--i45 [id number]      Referral #: [id number] Effective: 05/07/2018 Expires: 08/04/2018

## 2018-05-15 ENCOUNTER — OFFICE VISIT (OUTPATIENT)
Dept: FAMILY MEDICINE CLINIC | Facility: CLINIC | Age: 58
End: 2018-05-15
Payer: COMMERCIAL

## 2018-05-15 VITALS
HEIGHT: 63 IN | BODY MASS INDEX: 36.86 KG/M2 | WEIGHT: 208 LBS | DIASTOLIC BLOOD PRESSURE: 90 MMHG | RESPIRATION RATE: 16 BRPM | HEART RATE: 78 BPM | OXYGEN SATURATION: 98 % | SYSTOLIC BLOOD PRESSURE: 160 MMHG

## 2018-05-15 DIAGNOSIS — Z01.818 PRE-OP EVALUATION: Primary | ICD-10-CM

## 2018-05-15 DIAGNOSIS — I45.81 PROLONGED QT SYNDROME: ICD-10-CM

## 2018-05-15 DIAGNOSIS — E03.9 HYPOTHYROIDISM, UNSPECIFIED TYPE: ICD-10-CM

## 2018-05-15 DIAGNOSIS — I10 ESSENTIAL HYPERTENSION: ICD-10-CM

## 2018-05-15 DIAGNOSIS — M51.9 LUMBOSACRAL DISC DISEASE: ICD-10-CM

## 2018-05-15 PROCEDURE — 1036F TOBACCO NON-USER: CPT | Performed by: FAMILY MEDICINE

## 2018-05-15 PROCEDURE — 99214 OFFICE O/P EST MOD 30 MIN: CPT | Performed by: FAMILY MEDICINE

## 2018-05-15 NOTE — PROGRESS NOTES
[id number]         NAME: Lupe Posada is a 65 y o  female  : 1960    MRN: [id number]  DATE: May 15, 2018  TIME: 1:14 PM    Assessment and Plan   No primary diagnosis found  No diagnosis found  Patient Instructions     There are no Patient Instructions on file for this visit  Chief Complaint     Chief Complaint   Patient presents with    Pre-op Exam     pre op eval -          History of Present Illness       Pre-op for back surg        Review of Systems   Review of Systems   Constitutional: Negative for appetite change, chills, diaphoresis and fever  HENT: Negative for ear pain, rhinorrhea, sinus pressure and sore throat  Eyes: Negative for discharge, redness and itching  Respiratory: Negative for cough, shortness of breath and wheezing  Cardiovascular: Negative for chest pain and palpitations  Gastrointestinal: Negative for abdominal pain, diarrhea, nausea and vomiting     Musculoskeletal:        Per surg         Current Medications       Current Outpatient Prescriptions:     albuterol (PROAIR HFA) 90 mcg/act inhaler, Inhale 108 mcg as needed, Disp: , Rfl:     enalapril (VASOTEC) 10 mg tablet, Take 10 mg by mouth daily, Disp: , Rfl:     levothyroxine 137 mcg tablet, Take 137 mcg by mouth, Disp: , Rfl:     metoprolol succinate (TOPROL-XL) 25 mg 24 hr tablet, Take 25 mg by mouth, Disp: , Rfl:     ranitidine (ZANTAC) 150 mg tablet, Take 1 tablet by mouth 2 (two) times a day, Disp: 60 tablet, Rfl: 0    zolpidem (AMBIEN) 10 mg tablet, Take 10 mg by mouth as needed, Disp: , Rfl:     Current Allergies     Allergies as of 05/15/2018    (No Known Allergies)            The following portions of the patient's history were reviewed and updated as appropriate: allergies, current medications, past family history, past medical history, past social history, past surgical history and problem list      Past Medical History:   Diagnosis Date    Disease of thyroid gland     hypo    GERD (gastroesophageal reflux disease)     Hypertension     Prolonged QT interval        Past Surgical History:   Procedure Laterality Date     SECTION      CHOLECYSTECTOMY      HYSTERECTOMY      KNEE ARTHROPLASTY      PANNICULECTOMY      last assessed: 2013       Family History   Problem Relation Age of Onset    Hypertension Mother     Pancreatic cancer Mother     Hypertension Father     Substance Abuse Neg Hx     Mental illness Neg Hx          Medications have been verified  Objective   /90 (BP Location: Right arm, Patient Position: Sitting, Cuff Size: Large)   Pulse 78   Resp 16   Ht 5' 3" (1 6 m)   Wt 94 3 kg (208 lb)   SpO2 98%   BMI 36 85 kg/m²        Physical Exam     Physical Exam   Constitutional: She appears well-developed and well-nourished  HENT:   Right Ear: Tympanic membrane, external ear and ear canal normal  Tympanic membrane is not injected  Left Ear: Tympanic membrane, external ear and ear canal normal  Tympanic membrane is not injected  Nose: Nose normal    Mouth/Throat: Oropharynx is clear and moist and mucous membranes are normal    Eyes: Conjunctivae are normal  Pupils are equal, round, and reactive to light  Neck: Normal range of motion  Neck supple  No thyromegaly present  Cardiovascular: Normal rate, regular rhythm, normal heart sounds and intact distal pulses  Pulmonary/Chest: Effort normal and breath sounds normal  She has no wheezes  Musculoskeletal:   Per surg   Nursing note and vitals reviewed

## 2018-05-15 NOTE — PROGRESS NOTES
[id number]         NAME: Fabiola Logan is a 65 y o  female  : 1960    MRN: [id number]  DATE: May 15, 2018  TIME: 2:09 PM    Assessment and Plan   Pre-op evaluation [Z01 818]  1  Pre-op evaluation     2  Lumbosacral disc disease     3  Hypothyroidism, unspecified type     4  Essential hypertension     5  Prolonged QT syndrome           Patient Instructions     Patient Instructions   Clear for surgery pending labs  Cardiology consult pending  Chief Complaint     Chief Complaint   Patient presents with    Pre-op Exam     pre op eval -          History of Present Illness       Pre-op eval for lumbar surg        Review of Systems   Review of Systems   Constitutional: Negative for appetite change, chills, diaphoresis and fever  HENT: Negative for ear pain, rhinorrhea, sinus pressure and sore throat  Eyes: Negative for discharge, redness and itching  Respiratory: Negative for cough, shortness of breath and wheezing  Cardiovascular: Negative for chest pain and palpitations  Gastrointestinal: Negative for abdominal pain, diarrhea, nausea and vomiting           Current Medications       Current Outpatient Prescriptions:     albuterol (PROAIR HFA) 90 mcg/act inhaler, Inhale 108 mcg as needed, Disp: , Rfl:     enalapril (VASOTEC) 10 mg tablet, Take 10 mg by mouth daily, Disp: , Rfl:     levothyroxine 137 mcg tablet, Take 137 mcg by mouth, Disp: , Rfl:     metoprolol succinate (TOPROL-XL) 25 mg 24 hr tablet, Take 25 mg by mouth, Disp: , Rfl:     ranitidine (ZANTAC) 150 mg tablet, Take 1 tablet by mouth 2 (two) times a day, Disp: 60 tablet, Rfl: 0    zolpidem (AMBIEN) 10 mg tablet, Take 10 mg by mouth as needed, Disp: , Rfl:     Current Allergies     Allergies as of 05/15/2018    (No Known Allergies)            The following portions of the patient's history were reviewed and updated as appropriate: allergies, current medications, past family history, past medical history, past social history, past surgical history and problem list      Past Medical History:   Diagnosis Date    Disease of thyroid gland     hypo    GERD (gastroesophageal reflux disease)     Hypertension     Prolonged QT interval        Past Surgical History:   Procedure Laterality Date     SECTION      CHOLECYSTECTOMY      HYSTERECTOMY      KNEE ARTHROPLASTY      PANNICULECTOMY      last assessed: 2013       Family History   Problem Relation Age of Onset    Hypertension Mother     Pancreatic cancer Mother     Hypertension Father     Substance Abuse Neg Hx     Mental illness Neg Hx          Medications have been verified  Objective   /90 (BP Location: Right arm, Patient Position: Sitting, Cuff Size: Large)   Pulse 78   Resp 16   Ht 5' 3" (1 6 m)   Wt 94 3 kg (208 lb)   SpO2 98%   BMI 36 85 kg/m²        Physical Exam     Physical Exam   Constitutional: She appears well-developed and well-nourished  HENT:   Right Ear: Tympanic membrane, external ear and ear canal normal  Tympanic membrane is not injected  Left Ear: Tympanic membrane, external ear and ear canal normal  Tympanic membrane is not injected  Nose: Nose normal    Mouth/Throat: Oropharynx is clear and moist and mucous membranes are normal    Eyes: Conjunctivae are normal  Pupils are equal, round, and reactive to light  Neck: Normal range of motion  Neck supple  No thyromegaly present  Cardiovascular: Normal rate, regular rhythm, normal heart sounds and intact distal pulses  Pulmonary/Chest: Effort normal and breath sounds normal  She has no wheezes  Nursing note and vitals reviewed

## 2018-06-07 DIAGNOSIS — E03.9 HYPOTHYROIDISM, UNSPECIFIED TYPE: Primary | ICD-10-CM

## 2018-06-08 RX ORDER — LEVOTHYROXINE SODIUM 0.15 MG/1
TABLET ORAL
Qty: 90 TABLET | Refills: 0 | Status: SHIPPED | OUTPATIENT
Start: 2018-06-08 | End: 2018-07-23 | Stop reason: SDUPTHER

## 2018-07-09 DIAGNOSIS — Z13.6 SCREENING FOR CARDIOVASCULAR CONDITION: Primary | ICD-10-CM

## 2018-07-09 DIAGNOSIS — E03.9 HYPOTHYROIDISM, UNSPECIFIED TYPE: ICD-10-CM

## 2018-07-18 ENCOUNTER — TELEPHONE (OUTPATIENT)
Dept: FAMILY MEDICINE CLINIC | Facility: CLINIC | Age: 58
End: 2018-07-18

## 2018-07-18 NOTE — TELEPHONE ENCOUNTER
[address]  [id number]  Dx-eval & treat  appt 7/19/18  laura      No referral is required for this patient  This members product does not require referrals  Disclaimer:   [employer] and its Affiliates ([employer]) will pay for only those services covered under the [facility] Contract which are specifically noted and requested by the PCP or OBGYN on the referral  If any additional services, testing, or follow-up care are required, the PCP or OBGYN must be contacted prior to the delivery of such additional services for written approval on a separate referral  Non-referred services will not be covered by [facility]  Benefits are underwritten or administered by [facility], a subsidiary of [place] [address], which are independent licensees of the [employer] and [employer] [facility]

## 2018-07-20 LAB
ALBUMIN SERPL-MCNC: 4.5 G/DL (ref 3.5–5.5)
ALBUMIN/GLOB SERPL: 1.8 {RATIO} (ref 1.2–2.2)
ALP SERPL-CCNC: 65 IU/L (ref 39–117)
ALT SERPL-CCNC: 20 IU/L (ref 0–32)
AST SERPL-CCNC: 20 IU/L (ref 0–40)
BILIRUB SERPL-MCNC: 0.3 MG/DL (ref 0–1.2)
BUN SERPL-MCNC: 14 MG/DL (ref 6–24)
BUN/CREAT SERPL: 17 (ref 9–23)
CALCIUM SERPL-MCNC: 9.6 MG/DL (ref 8.7–10.2)
CHLORIDE SERPL-SCNC: 105 MMOL/L (ref 96–106)
CHOLEST SERPL-MCNC: 237 MG/DL (ref 100–199)
CHOLEST/HDLC SERPL: 4.2 RATIO (ref 0–4.4)
CO2 SERPL-SCNC: 21 MMOL/L (ref 20–29)
CREAT SERPL-MCNC: 0.82 MG/DL (ref 0.57–1)
GLOBULIN SER-MCNC: 2.5 G/DL (ref 1.5–4.5)
GLUCOSE SERPL-MCNC: 125 MG/DL (ref 65–99)
HDLC SERPL-MCNC: 56 MG/DL
LABORATORY COMMENT REPORT: NORMAL
LDLC SERPL CALC-MCNC: 149 MG/DL (ref 0–99)
POTASSIUM SERPL-SCNC: 4.1 MMOL/L (ref 3.5–5.2)
PROT SERPL-MCNC: 7 G/DL (ref 6–8.5)
SL AMB EGFR AFRICAN AMERICAN: 92 ML/MIN/1.73
SL AMB EGFR NON AFRICAN AMERICAN: 80 ML/MIN/1.73
SL AMB VLDL CHOLESTEROL CALC: 32 MG/DL (ref 5–40)
SODIUM SERPL-SCNC: 144 MMOL/L (ref 134–144)
TRIGL SERPL-MCNC: 159 MG/DL (ref 0–149)
TSH SERPL DL<=0.005 MIU/L-ACNC: 0.5 UIU/ML (ref 0.45–4.5)

## 2018-07-23 ENCOUNTER — OFFICE VISIT (OUTPATIENT)
Dept: FAMILY MEDICINE CLINIC | Facility: CLINIC | Age: 58
End: 2018-07-23
Payer: COMMERCIAL

## 2018-07-23 VITALS
OXYGEN SATURATION: 98 % | WEIGHT: 211 LBS | DIASTOLIC BLOOD PRESSURE: 78 MMHG | BODY MASS INDEX: 37.39 KG/M2 | HEART RATE: 74 BPM | HEIGHT: 63 IN | SYSTOLIC BLOOD PRESSURE: 124 MMHG

## 2018-07-23 DIAGNOSIS — E03.9 HYPOTHYROIDISM, UNSPECIFIED TYPE: ICD-10-CM

## 2018-07-23 DIAGNOSIS — I10 ESSENTIAL HYPERTENSION: ICD-10-CM

## 2018-07-23 DIAGNOSIS — Z00.00 WELLNESS EXAMINATION: ICD-10-CM

## 2018-07-23 DIAGNOSIS — R73.01 IFG (IMPAIRED FASTING GLUCOSE): ICD-10-CM

## 2018-07-23 DIAGNOSIS — Z12.31 SCREENING MAMMOGRAM, ENCOUNTER FOR: Primary | ICD-10-CM

## 2018-07-23 DIAGNOSIS — E78.5 HYPERLIPIDEMIA, UNSPECIFIED HYPERLIPIDEMIA TYPE: ICD-10-CM

## 2018-07-23 PROCEDURE — 99396 PREV VISIT EST AGE 40-64: CPT | Performed by: FAMILY MEDICINE

## 2018-07-23 PROCEDURE — 99213 OFFICE O/P EST LOW 20 MIN: CPT | Performed by: FAMILY MEDICINE

## 2018-07-23 PROCEDURE — 3008F BODY MASS INDEX DOCD: CPT | Performed by: FAMILY MEDICINE

## 2018-07-23 PROCEDURE — 3078F DIAST BP <80 MM HG: CPT | Performed by: FAMILY MEDICINE

## 2018-07-23 PROCEDURE — 3074F SYST BP LT 130 MM HG: CPT | Performed by: FAMILY MEDICINE

## 2018-07-23 RX ORDER — ZOLPIDEM TARTRATE 10 MG/1
10 TABLET ORAL AS NEEDED
Qty: 30 TABLET | Refills: 5 | Status: SHIPPED | OUTPATIENT
Start: 2018-07-23 | End: 2020-07-27 | Stop reason: SDUPTHER

## 2018-07-23 RX ORDER — LEVOTHYROXINE SODIUM 0.15 MG/1
150 TABLET ORAL DAILY
Qty: 90 TABLET | Refills: 3 | Status: SHIPPED | OUTPATIENT
Start: 2018-07-23 | End: 2019-06-12 | Stop reason: SDUPTHER

## 2018-07-23 RX ORDER — METOPROLOL SUCCINATE 50 MG/1
50 TABLET, EXTENDED RELEASE ORAL DAILY
Qty: 90 TABLET | Refills: 3 | Status: SHIPPED | OUTPATIENT
Start: 2018-07-23 | End: 2021-07-28

## 2018-07-23 RX ORDER — ENALAPRIL MALEATE 10 MG/1
10 TABLET ORAL DAILY
Qty: 90 TABLET | Refills: 3 | Status: SHIPPED | OUTPATIENT
Start: 2018-07-23 | End: 2021-07-28

## 2018-07-23 NOTE — PROGRESS NOTES
HPI:  Neva Lunsford is a 63 y o  female here for her yearly health maintenance exam    Patient Active Problem List   Diagnosis    Hypertension    Hypothyroidism    Prolonged QT syndrome     Past Medical History:   Diagnosis Date    Disease of thyroid gland     hypo    GERD (gastroesophageal reflux disease)     Hypertension     Prolonged QT interval        1  Advanced Directive: No     2  Durable Power of  for Healthcare: No     3  Social History:           Drug and alcohol History: No                  4  Immunizations up to date: Yes                 Lifestyle:                           Healthy Diet:Yes                          Alcohol Use:Yes                          Tobacco Use:No                          Regular exercise: Yes                          Weight concerns: No                               5  Over the past 2 weeks, how often have you been bothered by the following:              Little interest or pleasure in doing things:Not at all              Felling down, depressed or hopeless:Not at all       Current Outpatient Prescriptions   Medication Sig Dispense Refill    albuterol (PROAIR HFA) 90 mcg/act inhaler Inhale 108 mcg as needed      enalapril (VASOTEC) 10 mg tablet Take 1 tablet (10 mg total) by mouth daily 90 tablet 3    levothyroxine 150 mcg tablet Take 1 tablet (150 mcg total) by mouth daily 90 tablet 3    metoprolol succinate (TOPROL-XL) 50 mg 24 hr tablet Take 1 tablet (50 mg total) by mouth daily 90 tablet 3    ranitidine (ZANTAC) 150 mg tablet Take 1 tablet by mouth 2 (two) times a day 60 tablet 0    zolpidem (AMBIEN) 10 mg tablet Take 1 tablet (10 mg total) by mouth as needed for sleep 30 tablet 5     No current facility-administered medications for this visit        No Known Allergies  Immunization History   Administered Date(s) Administered    H1N1, All Formulations 10/23/2009    Influenza Quadrivalent Preservative Free 3 years and older IM 10/22/2017    Influenza TIV (IM) 10/15/2016    Tdap 03/22/2011       Patient Care Team:  Adal Humphries MD as PCP - General      Physical Exam :  Physical Exam     Reviewed Updated [facility] Prior Wellness Visits:   Last Health Maintenance visit information was reviewed, patient interviewed , no change since last HM visit yes  Last HM visit information was reviewed, patient interviewed and updates made to the record today yes    Assessment and Plan:  1  Screening mammogram, encounter for  Mammo screening bilateral w cad   2  Hypothyroidism, unspecified type  levothyroxine 150 mcg tablet   3  Essential hypertension  metoprolol succinate (TOPROL-XL) 50 mg 24 hr tablet    enalapril (VASOTEC) 10 mg tablet    zolpidem (AMBIEN) 10 mg tablet   4  Hyperlipidemia, unspecified hyperlipidemia type  Lipid panel    Lipid panel   5  IFG (impaired fasting glucose)  Hemoglobin A1c (w/out EAG)    Hemoglobin A1c (w/out EAG)   6   Wellness examination         Health Maintenance Due   Topic Date Due    Depression Screening PHQ-9  1960    CRC Screening: Colonoscopy  1960    CRC Screening: FOBTx3/FIT  06/02/2011    Medicare Annual Wellness Visit (AWV)  07/13/2018

## 2018-07-23 NOTE — PATIENT INSTRUCTIONS
Health maintenance completed  Lipid panel reviewed, will re-evaluate after lifestyle changes in 6 months

## 2018-10-15 ENCOUNTER — HOSPITAL ENCOUNTER (OUTPATIENT)
Dept: BONE DENSITY | Facility: IMAGING CENTER | Age: 58
Discharge: HOME | End: 2018-10-15
Payer: COMMERCIAL

## 2018-10-15 DIAGNOSIS — Z12.31 SCREENING MAMMOGRAM, ENCOUNTER FOR: ICD-10-CM

## 2018-10-15 PROCEDURE — 77067 SCR MAMMO BI INCL CAD: CPT

## 2019-02-18 LAB — HBA1C MFR BLD HPLC: 6 %

## 2019-04-15 DIAGNOSIS — L23.7 POISON IVY: Primary | ICD-10-CM

## 2019-04-15 RX ORDER — PREDNISONE 20 MG/1
TABLET ORAL
Qty: 15 TABLET | Refills: 0 | Status: SHIPPED | OUTPATIENT
Start: 2019-04-15 | End: 2019-07-25 | Stop reason: ALTCHOICE

## 2019-05-01 ENCOUNTER — TELEPHONE (OUTPATIENT)
Dept: FAMILY MEDICINE CLINIC | Facility: CLINIC | Age: 59
End: 2019-05-01

## 2019-05-01 DIAGNOSIS — L30.9 DERMATITIS: Primary | ICD-10-CM

## 2019-05-02 RX ORDER — PREDNISONE 20 MG/1
TABLET ORAL
Qty: 15 TABLET | Refills: 0 | Status: SHIPPED | OUTPATIENT
Start: 2019-05-02 | End: 2019-07-25 | Stop reason: ALTCHOICE

## 2019-06-04 DIAGNOSIS — E03.9 HYPOTHYROIDISM, UNSPECIFIED TYPE: Primary | ICD-10-CM

## 2019-06-11 DIAGNOSIS — E03.9 HYPOTHYROIDISM, UNSPECIFIED TYPE: ICD-10-CM

## 2019-06-11 LAB — TSH SERPL DL<=0.005 MIU/L-ACNC: 0.01 UIU/ML (ref 0.45–4.5)

## 2019-06-12 RX ORDER — LEVOTHYROXINE SODIUM 137 UG/1
137 TABLET ORAL DAILY
Qty: 30 TABLET | Refills: 2 | Status: SHIPPED | OUTPATIENT
Start: 2019-06-12 | End: 2019-08-06 | Stop reason: SDUPTHER

## 2019-07-05 ENCOUNTER — TRANSCRIBE ORDERS (OUTPATIENT)
Dept: ADMINISTRATIVE | Facility: HOSPITAL | Age: 59
End: 2019-07-05

## 2019-07-05 DIAGNOSIS — Z12.39 BREAST SCREENING, UNSPECIFIED: Primary | ICD-10-CM

## 2019-07-05 DIAGNOSIS — Z12.39 BREAST SCREENING: ICD-10-CM

## 2019-07-25 ENCOUNTER — OFFICE VISIT (OUTPATIENT)
Dept: FAMILY MEDICINE CLINIC | Facility: CLINIC | Age: 59
End: 2019-07-25
Payer: COMMERCIAL

## 2019-07-25 VITALS
TEMPERATURE: 98.7 F | HEART RATE: 71 BPM | SYSTOLIC BLOOD PRESSURE: 110 MMHG | OXYGEN SATURATION: 99 % | BODY MASS INDEX: 31.25 KG/M2 | WEIGHT: 176.4 LBS | RESPIRATION RATE: 18 BRPM | DIASTOLIC BLOOD PRESSURE: 70 MMHG | HEIGHT: 63 IN

## 2019-07-25 DIAGNOSIS — E74.39 GLUCOSE INTOLERANCE: ICD-10-CM

## 2019-07-25 DIAGNOSIS — I25.10 CORONARY ARTERIOSCLEROSIS: ICD-10-CM

## 2019-07-25 DIAGNOSIS — I10 ESSENTIAL HYPERTENSION: Primary | ICD-10-CM

## 2019-07-25 DIAGNOSIS — I45.81 PROLONGED QT SYNDROME: ICD-10-CM

## 2019-07-25 DIAGNOSIS — Z13.9 SCREENING FOR CONDITION: ICD-10-CM

## 2019-07-25 DIAGNOSIS — E03.9 HYPOTHYROIDISM, UNSPECIFIED TYPE: ICD-10-CM

## 2019-07-25 DIAGNOSIS — Z00.00 WELLNESS EXAMINATION: ICD-10-CM

## 2019-07-25 DIAGNOSIS — Z95.5 CORONARY STENT PATENT: ICD-10-CM

## 2019-07-25 PROCEDURE — 1036F TOBACCO NON-USER: CPT | Performed by: FAMILY MEDICINE

## 2019-07-25 PROCEDURE — 3074F SYST BP LT 130 MM HG: CPT | Performed by: FAMILY MEDICINE

## 2019-07-25 PROCEDURE — 99396 PREV VISIT EST AGE 40-64: CPT | Performed by: FAMILY MEDICINE

## 2019-07-25 PROCEDURE — 3008F BODY MASS INDEX DOCD: CPT | Performed by: FAMILY MEDICINE

## 2019-07-25 PROCEDURE — 99214 OFFICE O/P EST MOD 30 MIN: CPT | Performed by: FAMILY MEDICINE

## 2019-07-25 RX ORDER — PRAVASTATIN SODIUM 40 MG
40 TABLET ORAL DAILY
COMMUNITY
End: 2021-07-28

## 2019-07-25 RX ORDER — CLOPIDOGREL BISULFATE 75 MG/1
75 TABLET ORAL DAILY
COMMUNITY
End: 2020-07-27 | Stop reason: ALTCHOICE

## 2019-07-25 NOTE — PROGRESS NOTES
HPI:  Jennifer Sandoval is a 63 y o  female here for her yearly health maintenance exam    Patient Active Problem List   Diagnosis    Hypertension    Hypothyroidism    Prolonged QT syndrome     Past Medical History:   Diagnosis Date    Disease of thyroid gland     hypo    GERD (gastroesophageal reflux disease)     Hypertension     Prolonged QT interval        1  Advanced Directive: n     2  Durable Power of  for Healthcare: n     3  Social History:           Drug and alcohol History: n                  4  Immunizations up to date: y                 Lifestyle:                           Healthy Diet:y                          Alcohol Use:y                          Tobacco Use:n                          Regular exercise:y                          Weight concerns:n                               5  Over the past 2 weeks, how often have you been bothered by the following:              Little interest or pleasure in doing things:n              Felling down, depressed or hopeless:n       Current Outpatient Medications   Medication Sig Dispense Refill    albuterol (PROAIR HFA) 90 mcg/act inhaler Inhale 108 mcg as needed      clopidogrel (PLAVIX) 75 mg tablet Take 75 mg by mouth daily      enalapril (VASOTEC) 10 mg tablet Take 1 tablet (10 mg total) by mouth daily 90 tablet 3    levothyroxine 137 mcg tablet Take 1 tablet (137 mcg total) by mouth daily 30 tablet 2    metoprolol succinate (TOPROL-XL) 50 mg 24 hr tablet Take 1 tablet (50 mg total) by mouth daily 90 tablet 3    pravastatin (PRAVACHOL) 40 mg tablet Take 40 mg by mouth daily      ranitidine (ZANTAC) 150 mg tablet Take 1 tablet by mouth 2 (two) times a day 60 tablet 0    rivaroxaban (XARELTO) 15 mg tablet Take 15 mg by mouth daily      zolpidem (AMBIEN) 10 mg tablet Take 1 tablet (10 mg total) by mouth as needed for sleep 30 tablet 5     No current facility-administered medications for this visit        No Known Allergies  Immunization History Administered Date(s) Administered    H1N1, All Formulations 10/23/2009    INFLUENZA 09/29/2018    Influenza Quadrivalent Preservative Free 3 years and older IM 10/22/2017    Influenza TIV (IM) 10/15/2016    Tdap 03/22/2011       Patient Care Team:  Rahul Saxena MD as PCP - General    Review of Systems   Constitutional: Negative for appetite change, chills, diaphoresis and fever  HENT: Negative for ear pain, rhinorrhea, sinus pressure and sore throat  Eyes: Negative for discharge, redness and itching  Respiratory: Negative for cough, shortness of breath and wheezing  Cardiovascular: Negative for chest pain and palpitations  Gastrointestinal: Negative for abdominal pain, diarrhea, nausea and vomiting  Physical Exam :  Physical Exam   Constitutional: She appears well-developed and well-nourished  No distress  HENT:   Right Ear: Tympanic membrane, external ear and ear canal normal  Tympanic membrane is not injected  Left Ear: Tympanic membrane, external ear and ear canal normal  Tympanic membrane is not injected  Nose: Nose normal    Mouth/Throat: Oropharynx is clear and moist and mucous membranes are normal    Eyes: Pupils are equal, round, and reactive to light  Conjunctivae and EOM are normal  Right eye exhibits no discharge  Left eye exhibits no discharge  Neck: Normal range of motion  Neck supple  No thyromegaly present  Cardiovascular: Normal rate, regular rhythm and normal heart sounds  No murmur heard  Pulmonary/Chest: Effort normal and breath sounds normal  No respiratory distress  She has no wheezes  Lymphadenopathy:     She has no cervical adenopathy  Skin: She is not diaphoretic  Nursing note and vitals reviewed  Assessment and Plan:  1  Essential hypertension     2  Prolonged QT syndrome     3  Hypothyroidism, unspecified type  TSH, 3rd generation with Free T4 reflex    T4, free   4  Glucose intolerance     5  Coronary arteriosclerosis     6   Coronary stent patent     7  Wellness examination     8   Screening for condition  TSH, 3rd generation with Free T4 reflex    T4, free    Mammo screening bilateral w cad       Health Maintenance Due   Topic Date Due    Pneumococcal Vaccine: Pediatrics (0 to 5 Years) and At-Risk Patients (6 to 59 Years) (1 of 1 - PPSV23) 06/02/1967    BMI: Followup Plan  06/02/1979    CRC Screening: FOBTx3/FIT  06/02/2011

## 2019-07-25 NOTE — PROGRESS NOTES
[facility]        NAME: Isabel Felix is a 63 y o  female  : 1960    MRN: [id number]  DATE: 2019  TIME: 8:25 AM    Assessment and Plan   Essential hypertension [I10]  1  Essential hypertension     2  Prolonged QT syndrome     3  Hypothyroidism, unspecified type  TSH, 3rd generation with Free T4 reflex    T4, free   4  Glucose intolerance     5  Coronary arteriosclerosis     6  Coronary stent patent     7  Wellness examination     8  Screening for condition  TSH, 3rd generation with Free T4 reflex    T4, free    Mammo screening bilateral w cad         Patient Instructions     Patient Instructions   TSH goal 1-2 5          Chief Complaint     Chief Complaint   Patient presents with    Annual Exam     patient presents for her yearly physical    Follow-up     to chronic conditions/medication check         History of Present Illness       F/u for assessed med cond      Review of Systems   Review of Systems   Constitutional: Negative for fatigue, fever and unexpected weight change  HENT: Negative for congestion, sinus pain and sore throat  Eyes: Negative for visual disturbance  Respiratory: Negative for shortness of breath and wheezing  Cardiovascular: Negative for chest pain and palpitations  Gastrointestinal: Negative for abdominal pain, nausea and vomiting  Musculoskeletal: Negative  Negative for arthralgias and myalgias  Neurological: Negative for syncope, weakness and numbness  Psychiatric/Behavioral: Negative  Negative for confusion, dysphoric mood and suicidal ideas           Current Medications       Current Outpatient Medications:     albuterol (PROAIR HFA) 90 mcg/act inhaler, Inhale 108 mcg as needed, Disp: , Rfl:     clopidogrel (PLAVIX) 75 mg tablet, Take 75 mg by mouth daily, Disp: , Rfl:     enalapril (VASOTEC) 10 mg tablet, Take 1 tablet (10 mg total) by mouth daily, Disp: 90 tablet, Rfl: 3    levothyroxine 137 mcg tablet, Take 1 tablet (137 mcg total) by mouth daily, Disp: 30 tablet, Rfl: 2    metoprolol succinate (TOPROL-XL) 50 mg 24 hr tablet, Take 1 tablet (50 mg total) by mouth daily, Disp: 90 tablet, Rfl: 3    pravastatin (PRAVACHOL) 40 mg tablet, Take 40 mg by mouth daily, Disp: , Rfl:     ranitidine (ZANTAC) 150 mg tablet, Take 1 tablet by mouth 2 (two) times a day, Disp: 60 tablet, Rfl: 0    rivaroxaban (XARELTO) 15 mg tablet, Take 15 mg by mouth daily, Disp: , Rfl:     zolpidem (AMBIEN) 10 mg tablet, Take 1 tablet (10 mg total) by mouth as needed for sleep, Disp: 30 tablet, Rfl: 5    Current Allergies     Allergies as of 2019    (No Known Allergies)            The following portions of the patient's history were reviewed and updated as appropriate: allergies, current medications, past family history, past medical history, past social history, past surgical history and problem list      Past Medical History:   Diagnosis Date    Disease of thyroid gland     hypo    GERD (gastroesophageal reflux disease)     Hypertension     Prolonged QT interval        Past Surgical History:   Procedure Laterality Date    BACK SURGERY       SECTION      CHOLECYSTECTOMY      HYSTERECTOMY      KNEE ARTHROPLASTY      PANNICULECTOMY      last assessed: 2013       Family History   Problem Relation Age of Onset    Hypertension Mother     Pancreatic cancer Mother     Hypertension Father     Substance Abuse Neg Hx     Mental illness Neg Hx          Medications have been verified  Objective   /70 (BP Location: Left arm, Patient Position: Sitting, Cuff Size: Large)   Pulse 71   Temp 98 7 °F (37 1 °C) (Tympanic)   Resp 18   Ht 5' 3 39" (1 61 m)   Wt 80 kg (176 lb 6 4 oz)   LMP  (LMP Unknown)   SpO2 99%   Breastfeeding? No   BMI 30 87 kg/m²        Physical Exam     Physical Exam   Constitutional: She is oriented to person, place, and time   Vital signs are normal  She appears well-developed and well-nourished  HENT:   Right Ear: Ear canal normal  Tympanic membrane is not injected  Left Ear: Ear canal normal  Tympanic membrane is not injected  Nose: Nose normal    Mouth/Throat: Oropharynx is clear and moist    Eyes: Pupils are equal, round, and reactive to light  Conjunctivae and EOM are normal  Right eye exhibits no discharge  Left eye exhibits no discharge  Neck: Normal range of motion  Neck supple  No thyromegaly present  Cardiovascular: Normal rate, regular rhythm and normal heart sounds  No murmur heard  Pulmonary/Chest: Effort normal and breath sounds normal  No respiratory distress  She has no wheezes  Abdominal: Soft  Bowel sounds are normal  She exhibits no distension  There is no tenderness  Musculoskeletal: Normal range of motion  Lymphadenopathy:     She has no cervical adenopathy  Neurological: She is alert and oriented to person, place, and time  She has normal strength and normal reflexes  She is not disoriented  No sensory deficit  Gait normal    Skin: Skin is warm and dry  Psychiatric: She has a normal mood and affect   Her speech is normal and behavior is normal  Judgment and thought content normal  Cognition and memory are normal

## 2019-08-06 DIAGNOSIS — E03.9 HYPOTHYROIDISM, UNSPECIFIED TYPE: ICD-10-CM

## 2019-08-06 RX ORDER — LEVOTHYROXINE SODIUM 137 UG/1
TABLET ORAL
Qty: 30 TABLET | Refills: 2 | Status: SHIPPED | OUTPATIENT
Start: 2019-08-06 | End: 2020-07-27 | Stop reason: ALTCHOICE

## 2019-08-24 LAB
T4 FREE SERPL-MCNC: 2.18 NG/DL (ref 0.82–1.77)
TSH SERPL DL<=0.005 MIU/L-ACNC: 0.01 UIU/ML (ref 0.45–4.5)

## 2019-08-28 ENCOUNTER — TELEPHONE (OUTPATIENT)
Dept: FAMILY MEDICINE CLINIC | Facility: CLINIC | Age: 59
End: 2019-08-28

## 2019-08-28 DIAGNOSIS — E03.8 HYPOTHYROIDISM DUE TO HASHIMOTO'S THYROIDITIS: Primary | ICD-10-CM

## 2019-08-28 DIAGNOSIS — E06.3 HYPOTHYROIDISM DUE TO HASHIMOTO'S THYROIDITIS: Primary | ICD-10-CM

## 2019-08-28 NOTE — TELEPHONE ENCOUNTER
Patient notified with blood work results, patient would like to know if  there something else that she can do to improve her tsh levels, patient states that something needs to be done

## 2019-08-28 NOTE — TELEPHONE ENCOUNTER
----- Message from Nicholas Castro MD sent at 8/25/2019  7:26 AM EDT -----  Pt is hyperthyroid--tell her #s---decr dose one notch

## 2019-09-03 RX ORDER — LEVOTHYROXINE SODIUM 0.12 MG/1
125 TABLET ORAL DAILY
Qty: 30 TABLET | Refills: 1 | Status: SHIPPED | OUTPATIENT
Start: 2019-09-03 | End: 2021-07-28

## 2019-09-18 NOTE — TELEPHONE ENCOUNTER
9/18/19  I called and left a message for patient to call office  Dr Vlad Oviedo sent a note to me on 8/28/19 @ [phone] and I was out of office until today      9/19/2019  Spoke to patient and instructed as per Dr Vlad Oviedo, states she scheduled appointment with an Endo in [place] and will be seeing them on 10/1/19

## 2019-10-22 ENCOUNTER — HOSPITAL ENCOUNTER (OUTPATIENT)
Dept: BONE DENSITY | Facility: IMAGING CENTER | Age: 59
Discharge: HOME | End: 2019-10-22
Payer: COMMERCIAL

## 2019-10-22 VITALS — WEIGHT: 174 LBS | HEIGHT: 64 IN | BODY MASS INDEX: 29.71 KG/M2

## 2019-10-22 DIAGNOSIS — Z12.39 BREAST SCREENING: ICD-10-CM

## 2019-10-22 PROCEDURE — 77063 BREAST TOMOSYNTHESIS BI: CPT

## 2019-10-22 PROCEDURE — 77067 SCR MAMMO BI INCL CAD: CPT

## 2019-12-03 DIAGNOSIS — J45.901 ASTHMA WITH ACUTE EXACERBATION, UNSPECIFIED ASTHMA SEVERITY, UNSPECIFIED WHETHER PERSISTENT: Primary | ICD-10-CM

## 2019-12-03 RX ORDER — ALBUTEROL SULFATE 90 UG/1
1 AEROSOL, METERED RESPIRATORY (INHALATION) EVERY 4 HOURS PRN
Qty: 2 INHALER | Refills: 2 | Status: SHIPPED | OUTPATIENT
Start: 2019-12-03 | End: 2020-03-11 | Stop reason: SDUPTHER

## 2020-01-29 ENCOUNTER — TELEPHONE (OUTPATIENT)
Dept: FAMILY MEDICINE CLINIC | Facility: CLINIC | Age: 60
End: 2020-01-29

## 2020-01-29 DIAGNOSIS — J01.00 ACUTE NON-RECURRENT MAXILLARY SINUSITIS: Primary | ICD-10-CM

## 2020-01-29 RX ORDER — AMOXICILLIN 500 MG/1
500 CAPSULE ORAL EVERY 8 HOURS SCHEDULED
Qty: 30 CAPSULE | Refills: 0 | Status: SHIPPED | OUTPATIENT
Start: 2020-01-29 | End: 2020-02-08

## 2020-01-29 NOTE — TELEPHONE ENCOUNTER
Patient is having sinus problem/ head cold,congestion, pressure, ear   want something call in can not do z-pack   No other allengy

## 2020-03-11 DIAGNOSIS — J45.901 ASTHMA WITH ACUTE EXACERBATION, UNSPECIFIED ASTHMA SEVERITY, UNSPECIFIED WHETHER PERSISTENT: ICD-10-CM

## 2020-03-11 RX ORDER — ALBUTEROL SULFATE 90 UG/1
1 AEROSOL, METERED RESPIRATORY (INHALATION) EVERY 4 HOURS PRN
Qty: 3 INHALER | Refills: 0 | Status: SHIPPED | OUTPATIENT
Start: 2020-03-11 | End: 2020-07-27 | Stop reason: SDUPTHER

## 2020-07-20 ENCOUNTER — TRANSCRIBE ORDERS (OUTPATIENT)
Dept: ADMINISTRATIVE | Facility: HOSPITAL | Age: 60
End: 2020-07-20

## 2020-07-20 ENCOUNTER — TELEPHONE (OUTPATIENT)
Dept: OTHER | Facility: OTHER | Age: 60
End: 2020-07-20

## 2020-07-20 DIAGNOSIS — E03.8 HYPOTHYROIDISM DUE TO HASHIMOTO'S THYROIDITIS: Primary | ICD-10-CM

## 2020-07-20 DIAGNOSIS — E78.5 HYPERLIPIDEMIA, UNSPECIFIED HYPERLIPIDEMIA TYPE: ICD-10-CM

## 2020-07-20 DIAGNOSIS — E06.3 HYPOTHYROIDISM DUE TO HASHIMOTO'S THYROIDITIS: Primary | ICD-10-CM

## 2020-07-20 DIAGNOSIS — I45.81 PROLONGED QT SYNDROME: ICD-10-CM

## 2020-07-20 DIAGNOSIS — Z12.31 ENCOUNTER FOR SCREENING MAMMOGRAM FOR MALIGNANT NEOPLASM OF BREAST: Primary | ICD-10-CM

## 2020-07-20 DIAGNOSIS — R73.01 IFG (IMPAIRED FASTING GLUCOSE): ICD-10-CM

## 2020-07-20 DIAGNOSIS — I10 ESSENTIAL HYPERTENSION: ICD-10-CM

## 2020-07-20 NOTE — TELEPHONE ENCOUNTER
Pt is waiting at [facility]  and is stating her lab RX is not in the system and she fasted for 12 hrs and they (her and her  have a [facility] appointment)

## 2020-07-21 LAB
ALBUMIN SERPL-MCNC: 4.2 G/DL (ref 3.8–4.9)
ALBUMIN/GLOB SERPL: 2 {RATIO} (ref 1.2–2.2)
ALP SERPL-CCNC: 60 IU/L (ref 39–117)
ALT SERPL-CCNC: 15 IU/L (ref 0–32)
AST SERPL-CCNC: 19 IU/L (ref 0–40)
BILIRUB SERPL-MCNC: 0.3 MG/DL (ref 0–1.2)
BUN SERPL-MCNC: 12 MG/DL (ref 6–24)
BUN/CREAT SERPL: 13 (ref 9–23)
CALCIUM SERPL-MCNC: 9.3 MG/DL (ref 8.7–10.2)
CHLORIDE SERPL-SCNC: 107 MMOL/L (ref 96–106)
CHOLEST SERPL-MCNC: 165 MG/DL (ref 100–199)
CO2 SERPL-SCNC: 21 MMOL/L (ref 20–29)
CREAT SERPL-MCNC: 0.91 MG/DL (ref 0.57–1)
EST. AVERAGE GLUCOSE BLD GHB EST-MCNC: 120 MG/DL
GLOBULIN SER-MCNC: 2.1 G/DL (ref 1.5–4.5)
GLUCOSE SERPL-MCNC: 117 MG/DL (ref 65–99)
HBA1C MFR BLD: 5.8 % (ref 4.8–5.6)
HDLC SERPL-MCNC: 66 MG/DL
LDLC SERPL CALC-MCNC: 83 MG/DL (ref 0–99)
LDLC/HDLC SERPL: 1.3 RATIO (ref 0–3.2)
POTASSIUM SERPL-SCNC: 4 MMOL/L (ref 3.5–5.2)
PROT SERPL-MCNC: 6.3 G/DL (ref 6–8.5)
SL AMB EGFR AFRICAN AMERICAN: 80 ML/MIN/1.73
SL AMB EGFR NON AFRICAN AMERICAN: 69 ML/MIN/1.73
SL AMB VLDL CHOLESTEROL CALC: 16 MG/DL (ref 5–40)
SODIUM SERPL-SCNC: 145 MMOL/L (ref 134–144)
T4 FREE SERPL-MCNC: 1.27 NG/DL (ref 0.82–1.77)
TRIGL SERPL-MCNC: 81 MG/DL (ref 0–149)
TSH SERPL DL<=0.005 MIU/L-ACNC: 2.11 UIU/ML (ref 0.45–4.5)

## 2020-07-27 ENCOUNTER — OFFICE VISIT (OUTPATIENT)
Dept: FAMILY MEDICINE CLINIC | Facility: CLINIC | Age: 60
End: 2020-07-27
Payer: COMMERCIAL

## 2020-07-27 VITALS
OXYGEN SATURATION: 98 % | DIASTOLIC BLOOD PRESSURE: 72 MMHG | SYSTOLIC BLOOD PRESSURE: 122 MMHG | HEIGHT: 64 IN | TEMPERATURE: 97.4 F | HEART RATE: 71 BPM | WEIGHT: 186.8 LBS | BODY MASS INDEX: 31.89 KG/M2

## 2020-07-27 DIAGNOSIS — E03.9 HYPOTHYROIDISM, UNSPECIFIED TYPE: ICD-10-CM

## 2020-07-27 DIAGNOSIS — I45.81 PROLONGED QT SYNDROME: ICD-10-CM

## 2020-07-27 DIAGNOSIS — I10 ESSENTIAL HYPERTENSION: ICD-10-CM

## 2020-07-27 DIAGNOSIS — Z00.00 WELLNESS EXAMINATION: ICD-10-CM

## 2020-07-27 DIAGNOSIS — Z23 NEED FOR VACCINATION: Primary | ICD-10-CM

## 2020-07-27 DIAGNOSIS — Z20.822 CLOSE EXPOSURE TO COVID-19 VIRUS: ICD-10-CM

## 2020-07-27 DIAGNOSIS — J45.901 ASTHMA WITH ACUTE EXACERBATION, UNSPECIFIED ASTHMA SEVERITY, UNSPECIFIED WHETHER PERSISTENT: ICD-10-CM

## 2020-07-27 PROCEDURE — 90715 TDAP VACCINE 7 YRS/> IM: CPT | Performed by: FAMILY MEDICINE

## 2020-07-27 PROCEDURE — 99396 PREV VISIT EST AGE 40-64: CPT | Performed by: FAMILY MEDICINE

## 2020-07-27 PROCEDURE — 90471 IMMUNIZATION ADMIN: CPT | Performed by: FAMILY MEDICINE

## 2020-07-27 PROCEDURE — 3008F BODY MASS INDEX DOCD: CPT | Performed by: FAMILY MEDICINE

## 2020-07-27 PROCEDURE — 1036F TOBACCO NON-USER: CPT | Performed by: FAMILY MEDICINE

## 2020-07-27 PROCEDURE — 3074F SYST BP LT 130 MM HG: CPT | Performed by: FAMILY MEDICINE

## 2020-07-27 PROCEDURE — 99214 OFFICE O/P EST MOD 30 MIN: CPT | Performed by: FAMILY MEDICINE

## 2020-07-27 PROCEDURE — 3078F DIAST BP <80 MM HG: CPT | Performed by: FAMILY MEDICINE

## 2020-07-27 RX ORDER — ZOLPIDEM TARTRATE 10 MG/1
10 TABLET ORAL AS NEEDED
Qty: 30 TABLET | Refills: 5 | Status: SHIPPED | OUTPATIENT
Start: 2020-07-27 | End: 2021-05-25

## 2020-07-27 RX ORDER — RIVAROXABAN 20 MG/1
20 TABLET, FILM COATED ORAL EVERY EVENING
COMMUNITY
Start: 2020-07-07 | End: 2021-07-28

## 2020-07-27 RX ORDER — ALBUTEROL SULFATE 90 UG/1
1 AEROSOL, METERED RESPIRATORY (INHALATION) EVERY 4 HOURS PRN
Qty: 3 INHALER | Refills: 3 | Status: SHIPPED | OUTPATIENT
Start: 2020-07-27 | End: 2021-07-28

## 2020-07-27 RX ORDER — OMEPRAZOLE 40 MG/1
40 CAPSULE, DELAYED RELEASE ORAL DAILY
COMMUNITY
Start: 2020-07-24 | End: 2020-07-27 | Stop reason: ALTCHOICE

## 2020-07-27 NOTE — PROGRESS NOTES
HPI:  Des Toney is a 62 y o  female here for her yearly health maintenance exam    Patient Active Problem List   Diagnosis    Hypertension    Hypothyroidism    Prolonged QT syndrome     Past Medical History:   Diagnosis Date    Disease of thyroid gland     hypo    GERD (gastroesophageal reflux disease)     Hypertension     Prolonged QT interval        1  Advanced Directive: n     2  Durable Power of  for Healthcare: n     3  Social History:           Drug and alcohol History: n                  4  Immunizations up to date: y                 Lifestyle:                           Healthy Diet:y                          Alcohol Use:y                          Tobacco Use:n                          Regular exercise:y                          Weight concerns:y                               5  Over the past 2 weeks, how often have you been bothered by the following:              Little interest or pleasure in doing things:n              Felling down, depressed or hopeless:n       Current Outpatient Medications   Medication Sig Dispense Refill    albuterol (ProAir HFA) 90 mcg/act inhaler Inhale 1 puff every 4 (four) hours as needed for wheezing 3 Inhaler 3    enalapril (VASOTEC) 10 mg tablet Take 1 tablet (10 mg total) by mouth daily 90 tablet 3    metoprolol succinate (TOPROL-XL) 50 mg 24 hr tablet Take 1 tablet (50 mg total) by mouth daily 90 tablet 3    pravastatin (PRAVACHOL) 40 mg tablet Take 40 mg by mouth daily      XARELTO 20 MG tablet Take 20 mg by mouth every evening      zolpidem (AMBIEN) 10 mg tablet Take 1 tablet (10 mg total) by mouth as needed for sleep 30 tablet 5    levothyroxine 125 mcg tablet Take 1 tablet (125 mcg total) by mouth daily 30 tablet 1     No current facility-administered medications for this visit        No Known Allergies  Immunization History   Administered Date(s) Administered    H1N1, All Formulations 10/23/2009    INFLUENZA 09/29/2018    Influenza Quadrivalent Preservative Free 3 years and older IM 10/22/2017    Influenza TIV (IM) 10/15/2016    Tdap 03/22/2011, 07/27/2020       Patient Care Team:  Shira Locke MD as PCP - General    Review of Systems   Constitutional: Negative for appetite change, chills, diaphoresis and fever  HENT: Negative for ear pain, rhinorrhea, sinus pressure and sore throat  Eyes: Negative for discharge, redness and itching  Respiratory: Negative for cough, shortness of breath and wheezing  Cardiovascular: Negative for chest pain and palpitations  Gastrointestinal: Negative for abdominal pain, diarrhea, nausea and vomiting  Physical Exam :  Physical Exam   Constitutional: She appears well-developed and well-nourished  No distress  HENT:   Right Ear: Tympanic membrane, external ear and ear canal normal  Tympanic membrane is not injected  Left Ear: Tympanic membrane, external ear and ear canal normal  Tympanic membrane is not injected  Nose: Nose normal    Mouth/Throat: Oropharynx is clear and moist and mucous membranes are normal    Eyes: Pupils are equal, round, and reactive to light  Conjunctivae and EOM are normal  Right eye exhibits no discharge  Left eye exhibits no discharge  Neck: Normal range of motion  Neck supple  No thyromegaly present  Cardiovascular: Normal rate, regular rhythm and normal heart sounds  No murmur heard  Pulmonary/Chest: Effort normal and breath sounds normal  No respiratory distress  She has no wheezes  Lymphadenopathy:     She has no cervical adenopathy  Skin: She is not diaphoretic  Nursing note and vitals reviewed  Assessment and Plan:  1  Need for vaccination  TDAP VACCINE GREATER THAN OR EQUAL TO 6YO IM   2  Prolonged QT syndrome     3  Essential hypertension  zolpidem (AMBIEN) 10 mg tablet   4  Hypothyroidism, unspecified type     5  Wellness examination     6  Close exposure to COVID-19 virus  SARS CoV 2 SEROLOGY (COVID 19) AB (IGG), [place]   7   Asthma with acute exacerbation, unspecified asthma severity, unspecified whether persistent  albuterol (ProAir HFA) 90 mcg/act inhaler       Health Maintenance Due   Topic Date Due    HIV Screening  06/02/1976    BMI: Followup Plan  06/02/1979    Cervical Cancer Screening  06/02/1982    Influenza Vaccine  07/01/2020    Annual Physical  07/25/2020

## 2020-08-05 LAB
SARS-COV-2 IGG SERPL QL IA: NEGATIVE
SARS-COV-2 IGG SERPL QL IA: NEGATIVE
SARS-COV-2 IGG SERPL QL IA: NORMAL
SARS-COV-2 IGG SERPL QL IA: NORMAL

## 2020-08-06 ENCOUNTER — TELEPHONE (OUTPATIENT)
Dept: FAMILY MEDICINE CLINIC | Facility: CLINIC | Age: 60
End: 2020-08-06

## 2020-08-13 ENCOUNTER — TELEPHONE (OUTPATIENT)
Dept: FAMILY MEDICINE CLINIC | Facility: CLINIC | Age: 60
End: 2020-08-13

## 2020-08-13 DIAGNOSIS — L30.9 DERMATITIS: Primary | ICD-10-CM

## 2020-08-13 NOTE — TELEPHONE ENCOUNTER
Patient called stating she has poison ivy all over her right arm if you may please call her in a rx  She states this cream especifically works great for her (Triamcinolone)  Please review      Allergies: none    [facility]  [phone]

## 2020-08-14 DIAGNOSIS — L23.7 POISON IVY: Primary | ICD-10-CM

## 2020-08-14 RX ORDER — TRIAMCINOLONE ACETONIDE 5 MG/G
CREAM TOPICAL 3 TIMES DAILY
Qty: 30 G | Refills: 5 | Status: SHIPPED | OUTPATIENT
Start: 2020-08-14 | End: 2021-07-28

## 2020-08-14 RX ORDER — PREDNISONE 20 MG/1
TABLET ORAL
Qty: 15 TABLET | Refills: 0 | Status: SHIPPED | OUTPATIENT
Start: 2020-08-14 | End: 2021-07-28

## 2020-08-14 NOTE — TELEPHONE ENCOUNTER
Patient called stating that she also want it a rx (prednisone ) for poison ivy, please send to pharmacy on file, thank you

## 2020-10-26 ENCOUNTER — HOSPITAL ENCOUNTER (OUTPATIENT)
Dept: BONE DENSITY | Facility: IMAGING CENTER | Age: 60
Discharge: HOME | End: 2020-10-26
Payer: COMMERCIAL

## 2020-10-26 VITALS — BODY MASS INDEX: 32.44 KG/M2 | HEIGHT: 64 IN | WEIGHT: 190 LBS

## 2020-10-26 DIAGNOSIS — Z12.31 ENCOUNTER FOR SCREENING MAMMOGRAM FOR MALIGNANT NEOPLASM OF BREAST: ICD-10-CM

## 2020-10-26 PROCEDURE — 77067 SCR MAMMO BI INCL CAD: CPT

## 2020-10-26 PROCEDURE — 77063 BREAST TOMOSYNTHESIS BI: CPT

## 2021-05-24 DIAGNOSIS — I10 ESSENTIAL HYPERTENSION: ICD-10-CM

## 2021-05-25 RX ORDER — ZOLPIDEM TARTRATE 10 MG/1
TABLET ORAL
Qty: 30 TABLET | Refills: 5 | Status: SHIPPED | OUTPATIENT
Start: 2021-05-25 | End: 2021-07-28

## 2021-06-07 ENCOUNTER — TRANSCRIBE ORDERS (OUTPATIENT)
Dept: ADMINISTRATIVE | Facility: HOSPITAL | Age: 61
End: 2021-06-07

## 2021-06-07 DIAGNOSIS — E78.5 HYPERLIPIDEMIA, UNSPECIFIED HYPERLIPIDEMIA TYPE: ICD-10-CM

## 2021-06-07 DIAGNOSIS — Z12.31 ENCOUNTER FOR SCREENING MAMMOGRAM FOR MALIGNANT NEOPLASM OF BREAST: Primary | ICD-10-CM

## 2021-06-07 DIAGNOSIS — I10 ESSENTIAL HYPERTENSION: Primary | ICD-10-CM

## 2021-06-07 DIAGNOSIS — E03.8 HYPOTHYROIDISM DUE TO HASHIMOTO'S THYROIDITIS: ICD-10-CM

## 2021-06-07 DIAGNOSIS — E06.3 HYPOTHYROIDISM DUE TO HASHIMOTO'S THYROIDITIS: ICD-10-CM

## 2021-06-07 DIAGNOSIS — R73.01 IFG (IMPAIRED FASTING GLUCOSE): ICD-10-CM

## 2021-06-07 DIAGNOSIS — I45.81 PROLONGED QT SYNDROME: ICD-10-CM

## 2021-06-07 DIAGNOSIS — E03.9 HYPOTHYROIDISM, UNSPECIFIED TYPE: ICD-10-CM

## 2021-07-19 LAB
ALBUMIN SERPL-MCNC: 4.3 G/DL (ref 3.8–4.9)
ALBUMIN/GLOB SERPL: 1.6 {RATIO} (ref 1.2–2.2)
ALP SERPL-CCNC: 59 IU/L (ref 48–121)
ALT SERPL-CCNC: 19 IU/L (ref 0–32)
AST SERPL-CCNC: 23 IU/L (ref 0–40)
BILIRUB SERPL-MCNC: 0.4 MG/DL (ref 0–1.2)
BUN SERPL-MCNC: 15 MG/DL (ref 8–27)
BUN/CREAT SERPL: 19 (ref 12–28)
CALCIUM SERPL-MCNC: 9.7 MG/DL (ref 8.7–10.3)
CHLORIDE SERPL-SCNC: 99 MMOL/L (ref 96–106)
CHOLEST SERPL-MCNC: 190 MG/DL (ref 100–199)
CO2 SERPL-SCNC: 21 MMOL/L (ref 20–29)
CREAT SERPL-MCNC: 0.81 MG/DL (ref 0.57–1)
EST. AVERAGE GLUCOSE BLD GHB EST-MCNC: 120 MG/DL
GLOBULIN SER-MCNC: 2.7 G/DL (ref 1.5–4.5)
GLUCOSE SERPL-MCNC: 113 MG/DL (ref 65–99)
HBA1C MFR BLD: 5.8 % (ref 4.8–5.6)
HDLC SERPL-MCNC: 55 MG/DL
LDLC SERPL CALC-MCNC: 104 MG/DL (ref 0–99)
LDLC/HDLC SERPL: 1.9 RATIO (ref 0–3.2)
POTASSIUM SERPL-SCNC: 4 MMOL/L (ref 3.5–5.2)
PROT SERPL-MCNC: 7 G/DL (ref 6–8.5)
SL AMB EGFR AFRICAN AMERICAN: 91 ML/MIN/1.73
SL AMB EGFR NON AFRICAN AMERICAN: 79 ML/MIN/1.73
SL AMB VLDL CHOLESTEROL CALC: 31 MG/DL (ref 5–40)
SODIUM SERPL-SCNC: 131 MMOL/L (ref 134–144)
TRIGL SERPL-MCNC: 178 MG/DL (ref 0–149)
TSH SERPL DL<=0.005 MIU/L-ACNC: 0.27 UIU/ML (ref 0.45–4.5)

## 2021-07-28 ENCOUNTER — OFFICE VISIT (OUTPATIENT)
Dept: FAMILY MEDICINE CLINIC | Facility: CLINIC | Age: 61
End: 2021-07-28
Payer: COMMERCIAL

## 2021-07-28 VITALS
OXYGEN SATURATION: 99 % | SYSTOLIC BLOOD PRESSURE: 136 MMHG | BODY MASS INDEX: 33.73 KG/M2 | HEIGHT: 63 IN | WEIGHT: 190.4 LBS | DIASTOLIC BLOOD PRESSURE: 78 MMHG | RESPIRATION RATE: 16 BRPM | HEART RATE: 82 BPM

## 2021-07-28 DIAGNOSIS — Z13.9 SCREENING FOR CONDITION: ICD-10-CM

## 2021-07-28 DIAGNOSIS — Z00.00 WELLNESS EXAMINATION: Primary | ICD-10-CM

## 2021-07-28 DIAGNOSIS — I10 ESSENTIAL HYPERTENSION: ICD-10-CM

## 2021-07-28 DIAGNOSIS — E03.9 HYPOTHYROIDISM, UNSPECIFIED TYPE: ICD-10-CM

## 2021-07-28 DIAGNOSIS — E74.39 GLUCOSE INTOLERANCE: ICD-10-CM

## 2021-07-28 PROCEDURE — 3725F SCREEN DEPRESSION PERFORMED: CPT | Performed by: FAMILY MEDICINE

## 2021-07-28 PROCEDURE — 99396 PREV VISIT EST AGE 40-64: CPT | Performed by: FAMILY MEDICINE

## 2021-07-28 PROCEDURE — 99214 OFFICE O/P EST MOD 30 MIN: CPT | Performed by: FAMILY MEDICINE

## 2021-07-28 RX ORDER — FLECAINIDE ACETATE 50 MG/1
TABLET ORAL
COMMUNITY
Start: 2021-07-19

## 2021-07-28 RX ORDER — ZOLPIDEM TARTRATE 10 MG/1
10 TABLET ORAL
Qty: 30 TABLET | Refills: 5 | Status: SHIPPED | OUTPATIENT
Start: 2021-07-28

## 2021-07-28 RX ORDER — LEVOTHYROXINE SODIUM 125 UG/1
125 TABLET ORAL DAILY
COMMUNITY
End: 2021-10-15

## 2021-07-28 RX ORDER — ENALAPRIL MALEATE 10 MG/1
TABLET ORAL
COMMUNITY
Start: 2020-10-13

## 2021-07-28 NOTE — PROGRESS NOTES
HPI:  Isabel Felix is a 62 y o  female here for her yearly health maintenance exam    Patient Active Problem List   Diagnosis    Hypertension    Hypothyroidism     Past Medical History:   Diagnosis Date    Cancer ([facility] [address] ) Basal cell    Clotting disorder ([facility] [address] ) on Anticoagulant therapy    Coronary artery disease 8/11/2018    Stent/Afib    Disease of thyroid gland     hypo    GERD (gastroesophageal reflux disease)     Hypertension     Prolonged QT interval        1  Advanced Directive: n     2  Durable Power of  for Healthcare: n     3  Social History:           Drug and alcohol History: n                  4  Immunizations up to date: y                 Lifestyle:                           Healthy Diet:y                          Alcohol Use:y                          Tobacco Use:n                          Regular exercise:y                          Weight concerns:y                               5  Over the past 2 weeks, how often have you been bothered by the following:              Little interest or pleasure in doing things:y              Felling down, depressed or hopeless:y       Current Outpatient Medications   Medication Sig Dispense Refill    enalapril (VASOTEC) 10 mg tablet       flecainide (TAMBOCOR) 50 mg tablet TAKE 1 TABLET AS NEEDED FOR ATRIAL FIBRILLATION, MAY REPEAT X1 IN 30 MINUTES IF NEEDED ORALLY      Synthroid 125 MCG tablet Take 125 mcg by mouth daily       No current facility-administered medications for this visit       No Known Allergies  Immunization History   Administered Date(s) Administered    H1N1, All Formulations 10/23/2009    INFLUENZA 09/29/2018, 09/28/2019, 10/24/2020    Influenza Quadrivalent Preservative Free 3 years and older IM 10/22/2017    Influenza, seasonal, injectable 10/15/2016    SARS-CoV-2 / COVID-19 mRNA IM ([facility]) 01/06/2021, 02/05/2021    Tdap 03/22/2011, 07/27/2020       Patient Care Team:  Jayce Cole MD as PCP - General    Review of Systems   Constitutional: Negative for appetite change, chills, diaphoresis and fever  HENT: Negative for ear pain, rhinorrhea, sinus pressure and sore throat  Eyes: Negative for discharge, redness and itching  Respiratory: Negative for cough, shortness of breath and wheezing  Cardiovascular: Negative for chest pain and palpitations  Gastrointestinal: Negative for abdominal pain, diarrhea, nausea and vomiting  Physical Exam :  Physical Exam  Vitals and nursing note reviewed  Constitutional:       General: She is not in acute distress  Appearance: She is well-developed  She is not diaphoretic  HENT:      Right Ear: Tympanic membrane, ear canal and external ear normal  Tympanic membrane is not injected  Left Ear: Tympanic membrane, ear canal and external ear normal  Tympanic membrane is not injected  Nose: Nose normal    Eyes:      General:         Right eye: No discharge  Left eye: No discharge  Conjunctiva/sclera: Conjunctivae normal       Pupils: Pupils are equal, round, and reactive to light  Neck:      Thyroid: No thyromegaly  Cardiovascular:      Rate and Rhythm: Normal rate and regular rhythm  Heart sounds: Normal heart sounds  No murmur heard  Pulmonary:      Effort: Pulmonary effort is normal  No respiratory distress  Breath sounds: Normal breath sounds  No wheezing  Musculoskeletal:      Cervical back: Normal range of motion and neck supple  Lymphadenopathy:      Cervical: No cervical adenopathy  Assessment and Plan:  1  Wellness examination     2  Hypothyroidism, unspecified type  T4, free    TSH, 3rd generation with Free T4 reflex    T4, free    TSH, 3rd generation with Free T4 reflex   3   Essential hypertension         Health Maintenance Due   Topic Date Due    HIV Screening  Never done    BMI: Followup Plan  Never done    Cervical Cancer Screening  Never done    Influenza Vaccine (1) 09/01/2021    Breast Cancer Screening: Mammogram  10/26/2021

## 2021-07-28 NOTE — PROGRESS NOTES
[facility]        NAME: Reina Ryan is a 62 y o  female  : 1960    MRN: [id number]  DATE: 2021  TIME: 10:14 AM    Assessment and Plan   Wellness examination [Z00 00]  1  Wellness examination     2  Hypothyroidism, unspecified type  T4, free    TSH, 3rd generation with Free T4 reflex    T4, free    TSH, 3rd generation with Free T4 reflex   3  Essential hypertension           Patient Instructions     Patient Instructions   When done current synthroid will switch to synthroid 112 Brand Nec          Chief Complaint     Chief Complaint   Patient presents with    Physical Exam         History of Present Illness       F/u assessed med cond--stable      Review of Systems   Review of Systems   Constitutional: Negative for fatigue, fever and unexpected weight change  HENT: Negative for congestion, sinus pain and sore throat  Eyes: Negative for visual disturbance  Respiratory: Negative for shortness of breath and wheezing  Cardiovascular: Negative for chest pain and palpitations  Gastrointestinal: Negative for abdominal pain, nausea and vomiting  Musculoskeletal: Negative  Negative for arthralgias and myalgias  Neurological: Negative for syncope, weakness and numbness  Psychiatric/Behavioral: Negative  Negative for confusion, dysphoric mood and suicidal ideas           Current Medications       Current Outpatient Medications:     enalapril (VASOTEC) 10 mg tablet, , Disp: , Rfl:     flecainide (TAMBOCOR) 50 mg tablet, TAKE 1 TABLET AS NEEDED FOR ATRIAL FIBRILLATION, MAY REPEAT X1 IN 30 MINUTES IF NEEDED ORALLY, Disp: , Rfl:     Synthroid 125 MCG tablet, Take 125 mcg by mouth daily, Disp: , Rfl:     Current Allergies     Allergies as of 2021    (No Known Allergies)            The following portions of the patient's history were reviewed and updated as appropriate: allergies, current medications, past family history, past medical history, past social history, past surgical history and problem list      Past Medical History:   Diagnosis Date    Cancer ([facility] [address] Basal cell    Clotting disorder ([facility] [address] ) on Anticoagulant therapy    Coronary artery disease 2018    Stent/Afib    Disease of thyroid gland     hypo    GERD (gastroesophageal reflux disease)     Hypertension     Prolonged QT interval        Past Surgical History:   Procedure Laterality Date    BACK SURGERY       SECTION      CHOLECYSTECTOMY      HYSTERECTOMY      age 44    KNEE ARTHROPLASTY      PANNICULECTOMY      last assessed: 2013    SALPINGOOPHORECTOMY Right     age 44 with hysterectomy    SPINE SURGERY  2018    disc removed L4-5       Family History   Problem Relation Age of Onset    Hypertension Mother     Pancreatic cancer Mother 61    Cancer Mother         pancreatic    Hypertension Father     No Known Problems Sister     No Known Problems Daughter     No Known Problems Maternal Grandmother     No Known Problems Maternal Grandfather     No Known Problems Paternal Grandmother     No Known Problems Paternal Grandfather     No Known Problems Daughter     No Known Problems Paternal Aunt     No Known Problems Paternal Aunt     No Known Problems Paternal Aunt     No Known Problems Paternal Aunt     No Known Problems Paternal Aunt     Substance Abuse Neg Hx     Mental illness Neg Hx          Medications have been verified  Objective   /78   Pulse 82   Resp 16   Ht 5' 3" (1 6 m)   Wt 86 4 kg (190 lb 6 4 oz)   LMP  (LMP Unknown)   SpO2 99%   BMI 33 73 kg/m²        Physical Exam     Physical Exam  Constitutional:       Appearance: She is well-developed  HENT:      Right Ear: Ear canal normal  Tympanic membrane is not injected  Left Ear: Ear canal normal  Tympanic membrane is not injected  Nose: Nose normal    Eyes:      General:         Right eye: No discharge  Left eye: No discharge        Conjunctiva/sclera: Conjunctivae normal       Pupils: Pupils are equal, round, and reactive to light  Neck:      Thyroid: No thyromegaly  Cardiovascular:      Rate and Rhythm: Normal rate and regular rhythm  Heart sounds: Normal heart sounds  No murmur heard  Pulmonary:      Effort: Pulmonary effort is normal  No respiratory distress  Breath sounds: Normal breath sounds  No wheezing  Abdominal:      General: Bowel sounds are normal  There is no distension  Palpations: Abdomen is soft  Tenderness: There is no abdominal tenderness  Musculoskeletal:         General: Normal range of motion  Cervical back: Normal range of motion and neck supple  Lymphadenopathy:      Cervical: No cervical adenopathy  Skin:     General: Skin is warm and dry  Neurological:      Mental Status: She is alert and oriented to person, place, and time  She is not disoriented  Sensory: No sensory deficit  Gait: Gait normal       Deep Tendon Reflexes: Reflexes are normal and symmetric  Psychiatric:         Speech: Speech normal          Behavior: Behavior normal          Thought Content:  Thought content normal          Judgment: Judgment normal

## 2021-08-05 ENCOUNTER — OFFICE VISIT (OUTPATIENT)
Dept: FAMILY MEDICINE CLINIC | Facility: CLINIC | Age: 61
End: 2021-08-05
Payer: COMMERCIAL

## 2021-08-05 VITALS
WEIGHT: 194 LBS | SYSTOLIC BLOOD PRESSURE: 136 MMHG | HEIGHT: 63 IN | BODY MASS INDEX: 34.38 KG/M2 | RESPIRATION RATE: 16 BRPM | HEART RATE: 68 BPM | OXYGEN SATURATION: 98 % | DIASTOLIC BLOOD PRESSURE: 78 MMHG

## 2021-08-05 DIAGNOSIS — M75.52 BURSITIS OF LEFT SHOULDER: Primary | ICD-10-CM

## 2021-08-05 PROCEDURE — 99213 OFFICE O/P EST LOW 20 MIN: CPT | Performed by: FAMILY MEDICINE

## 2021-08-05 PROCEDURE — 1036F TOBACCO NON-USER: CPT | Performed by: FAMILY MEDICINE

## 2021-08-05 PROCEDURE — 20610 DRAIN/INJ JOINT/BURSA W/O US: CPT | Performed by: FAMILY MEDICINE

## 2021-08-05 PROCEDURE — 3008F BODY MASS INDEX DOCD: CPT | Performed by: FAMILY MEDICINE

## 2021-08-05 RX ORDER — DEXAMETHASONE SODIUM PHOSPHATE 100 MG/10ML
40 INJECTION INTRAMUSCULAR; INTRAVENOUS
Status: COMPLETED | OUTPATIENT
Start: 2021-08-05 | End: 2021-08-05

## 2021-08-05 RX ORDER — LIDOCAINE HYDROCHLORIDE 10 MG/ML
2 INJECTION, SOLUTION INFILTRATION; PERINEURAL
Status: COMPLETED | OUTPATIENT
Start: 2021-08-05 | End: 2021-08-05

## 2021-08-05 RX ADMIN — LIDOCAINE HYDROCHLORIDE 2 ML: 10 INJECTION, SOLUTION INFILTRATION; PERINEURAL at 10:13

## 2021-08-05 RX ADMIN — DEXAMETHASONE SODIUM PHOSPHATE 40 MG: 100 INJECTION INTRAMUSCULAR; INTRAVENOUS at 10:13

## 2021-08-05 NOTE — PROGRESS NOTES
[facility]        NAME: Mary Ann Ruiz is a 62 y o  female  : 1960    MRN: [id number]  DATE: 2021  TIME: 10:12 AM    Assessment and Plan   Bursitis of left shoulder [M75 52]  1  Bursitis of left shoulder           Patient Instructions     Patient Instructions   30mg dexameth L shoulder          Chief Complaint     Chief Complaint   Patient presents with    Shoulder Pain     LEFT SHOULDER         History of Present Illness       C/o L shoulder pain    Shoulder Pain   Pertinent negatives include no fever or numbness  Review of Systems   Review of Systems   Constitutional: Negative for fatigue, fever and unexpected weight change  HENT: Negative for congestion, sinus pain and sore throat  Eyes: Negative for visual disturbance  Respiratory: Negative for shortness of breath and wheezing  Cardiovascular: Negative for chest pain and palpitations  Gastrointestinal: Negative for abdominal pain, nausea and vomiting  Musculoskeletal: Negative  Negative for arthralgias and myalgias  Neurological: Negative for syncope, weakness and numbness  Psychiatric/Behavioral: Negative  Negative for confusion, dysphoric mood and suicidal ideas           Current Medications       Current Outpatient Medications:     enalapril (VASOTEC) 10 mg tablet, , Disp: , Rfl:     flecainide (TAMBOCOR) 50 mg tablet, TAKE 1 TABLET AS NEEDED FOR ATRIAL FIBRILLATION, MAY REPEAT X1 IN 30 MINUTES IF NEEDED ORALLY, Disp: , Rfl:     Synthroid 125 MCG tablet, Take 125 mcg by mouth daily, Disp: , Rfl:     zolpidem (AMBIEN) 10 mg tablet, Take 1 tablet (10 mg total) by mouth daily at bedtime as needed for sleep, Disp: 30 tablet, Rfl: 5    Current Allergies     Allergies as of 2021    (No Known Allergies)            The following portions of the patient's history were reviewed and updated as appropriate: allergies, current medications, past family history, past medical history, past social history, past surgical history and problem list      Past Medical History:   Diagnosis Date    Cancer ([facility] [address] ) Basal cell    Clotting disorder ([facility] [address] ) on Anticoagulant therapy    Coronary artery disease 2018    Stent/Afib    Disease of thyroid gland     hypo    GERD (gastroesophageal reflux disease)     Hypertension     Prolonged QT interval        Past Surgical History:   Procedure Laterality Date    BACK SURGERY       SECTION      CHOLECYSTECTOMY      HYSTERECTOMY      age 44    KNEE ARTHROPLASTY      PANNICULECTOMY      last assessed: 2013    SALPINGOOPHORECTOMY Right     age 44 with hysterectomy    SPINE SURGERY  2018    disc removed L4-5       Family History   Problem Relation Age of Onset    Hypertension Mother     Pancreatic cancer Mother 61    Cancer Mother         pancreatic    Hypertension Father     No Known Problems Sister     No Known Problems Daughter     No Known Problems Maternal Grandmother     No Known Problems Maternal Grandfather     No Known Problems Paternal Grandmother     No Known Problems Paternal Grandfather     No Known Problems Daughter     No Known Problems Paternal Aunt     No Known Problems Paternal Aunt     No Known Problems Paternal Aunt     No Known Problems Paternal Aunt     No Known Problems Paternal Aunt     Substance Abuse Neg Hx     Mental illness Neg Hx          Medications have been verified  Objective   /78   Pulse 68   Resp 16   Ht 5' 3" (1 6 m)   Wt 88 kg (194 lb)   LMP  (LMP Unknown)   SpO2 98%   BMI 34 37 kg/m²        Physical Exam     Physical Exam  Constitutional:       Appearance: She is well-developed  HENT:      Right Ear: Ear canal normal  Tympanic membrane is not injected  Left Ear: Ear canal normal  Tympanic membrane is not injected  Nose: Nose normal    Eyes:      General:         Right eye: No discharge  Left eye: No discharge        Conjunctiva/sclera: Conjunctivae normal       Pupils: Pupils are equal, round, and reactive to light  Neck:      Thyroid: No thyromegaly  Cardiovascular:      Rate and Rhythm: Normal rate and regular rhythm  Heart sounds: Normal heart sounds  No murmur heard  Pulmonary:      Effort: Pulmonary effort is normal  No respiratory distress  Breath sounds: Normal breath sounds  No wheezing  Abdominal:      General: Bowel sounds are normal  There is no distension  Palpations: Abdomen is soft  Tenderness: There is no abdominal tenderness  Musculoskeletal:         General: Normal range of motion  Cervical back: Normal range of motion and neck supple  Comments: L shoulder c/w impingement syndr   Lymphadenopathy:      Cervical: No cervical adenopathy  Skin:     General: Skin is warm and dry  Neurological:      Mental Status: She is alert and oriented to person, place, and time  She is not disoriented  Sensory: No sensory deficit  Gait: Gait normal       Deep Tendon Reflexes: Reflexes are normal and symmetric  Psychiatric:         Speech: Speech normal          Behavior: Behavior normal          Thought Content:  Thought content normal          Judgment: Judgment normal

## 2021-08-05 NOTE — PROGRESS NOTES
Large joint arthrocentesis: L subacromial bursa  Universal Protocol:  Consent: Verbal consent obtained    Consent given by: patient  Patient understanding: patient states understanding of the procedure being performed    Supporting Documentation  Indications: pain   Procedure Details  Location: shoulder - L subacromial bursa  Needle size: 22 G  Approach: posterolateral  Medications administered: 40 mg dexamethasone 100 mg/10 mL; 2 mL lidocaine 1 %    Patient tolerance: patient tolerated the procedure well with no immediate complications  Dressing:  Sterile dressing applied

## 2021-09-16 ENCOUNTER — HOSPITAL ENCOUNTER (OUTPATIENT)
Dept: RADIOLOGY | Facility: HOSPITAL | Age: 61
Discharge: HOME | End: 2021-09-16
Payer: COMMERCIAL

## 2021-09-16 ENCOUNTER — OFFICE VISIT (OUTPATIENT)
Dept: FAMILY MEDICINE CLINIC | Facility: CLINIC | Age: 61
End: 2021-09-16
Payer: COMMERCIAL

## 2021-09-16 VITALS
HEIGHT: 63 IN | DIASTOLIC BLOOD PRESSURE: 76 MMHG | WEIGHT: 194 LBS | BODY MASS INDEX: 34.38 KG/M2 | SYSTOLIC BLOOD PRESSURE: 126 MMHG | HEART RATE: 78 BPM | TEMPERATURE: 97.1 F | OXYGEN SATURATION: 97 %

## 2021-09-16 DIAGNOSIS — M75.90 BURSITIS AND TENDINITIS OF SHOULDER REGION: ICD-10-CM

## 2021-09-16 DIAGNOSIS — M75.50 BURSITIS AND TENDINITIS OF SHOULDER REGION: ICD-10-CM

## 2021-09-16 DIAGNOSIS — M75.50 BURSITIS AND TENDINITIS OF SHOULDER REGION: Primary | ICD-10-CM

## 2021-09-16 DIAGNOSIS — M75.90 BURSITIS AND TENDINITIS OF SHOULDER REGION: Primary | ICD-10-CM

## 2021-09-16 PROCEDURE — 3008F BODY MASS INDEX DOCD: CPT | Performed by: FAMILY MEDICINE

## 2021-09-16 PROCEDURE — 99214 OFFICE O/P EST MOD 30 MIN: CPT | Performed by: FAMILY MEDICINE

## 2021-09-16 PROCEDURE — 73030 X-RAY EXAM OF SHOULDER: CPT

## 2021-09-16 PROCEDURE — 1036F TOBACCO NON-USER: CPT | Performed by: FAMILY MEDICINE

## 2021-09-16 NOTE — PROGRESS NOTES
[facility]        NAME: Rosita Winston is a 62 y o  female  : 1960    MRN: [id number]  DATE: 2021  TIME: 8:55 AM    Assessment and Plan   Bursitis and tendinitis of shoulder region [M75 50, M75 90]  1  Bursitis and tendinitis of shoulder region           Patient Instructions     Patient Instructions   Re-inject L shoulder--10mg dexa---if no response will need MRI--ck xray--cont PT          Chief Complaint     Chief Complaint   Patient presents with    Injections     cortizone shot in shoulder         History of Present Illness       L shoulder pain cont--good initial resp      Review of Systems   Review of Systems   Constitutional: Negative for fatigue, fever and unexpected weight change  HENT: Negative for congestion, sinus pain and sore throat  Eyes: Negative for visual disturbance  Respiratory: Negative for shortness of breath and wheezing  Cardiovascular: Negative for chest pain and palpitations  Gastrointestinal: Negative for abdominal pain, nausea and vomiting  Musculoskeletal: Negative  Negative for arthralgias and myalgias  Neurological: Negative for syncope, weakness and numbness  Psychiatric/Behavioral: Negative  Negative for confusion, dysphoric mood and suicidal ideas           Current Medications       Current Outpatient Medications:     enalapril (VASOTEC) 10 mg tablet, , Disp: , Rfl:     flecainide (TAMBOCOR) 50 mg tablet, TAKE 1 TABLET AS NEEDED FOR ATRIAL FIBRILLATION, MAY REPEAT X1 IN 30 MINUTES IF NEEDED ORALLY, Disp: , Rfl:     Synthroid 125 MCG tablet, Take 125 mcg by mouth daily, Disp: , Rfl:     zolpidem (AMBIEN) 10 mg tablet, Take 1 tablet (10 mg total) by mouth daily at bedtime as needed for sleep, Disp: 30 tablet, Rfl: 5    Current Allergies     Allergies as of 2021    (No Known Allergies)            The following portions of the patient's history were reviewed and updated as appropriate: allergies, current medications, past family history, past medical history, past social history, past surgical history and problem list      Past Medical History:   Diagnosis Date    Cancer ([facility] [address] ) Basal cell    Clotting disorder ([facility] [address] ) on Anticoagulant therapy    Coronary artery disease 2018    Stent/Afib    Disease of thyroid gland     hypo    GERD (gastroesophageal reflux disease)     Hypertension     Prolonged QT interval        Past Surgical History:   Procedure Laterality Date    BACK SURGERY       SECTION      CHOLECYSTECTOMY      HYSTERECTOMY      age 44    KNEE ARTHROPLASTY      PANNICULECTOMY      last assessed: 2013    SALPINGOOPHORECTOMY Right     age 44 with hysterectomy    SPINE SURGERY  2018    disc removed L4-5       Family History   Problem Relation Age of Onset    Hypertension Mother     Pancreatic cancer Mother 61    Cancer Mother         pancreatic    Hypertension Father     No Known Problems Sister     No Known Problems Daughter     No Known Problems Maternal Grandmother     No Known Problems Maternal Grandfather     No Known Problems Paternal Grandmother     No Known Problems Paternal Grandfather     No Known Problems Daughter     No Known Problems Paternal Aunt     No Known Problems Paternal Aunt     No Known Problems Paternal Aunt     No Known Problems Paternal Aunt     No Known Problems Paternal Aunt     Substance Abuse Neg Hx     Mental illness Neg Hx          Medications have been verified  Objective   /76 (BP Location: Right arm, Patient Position: Sitting, Cuff Size: Adult)   Pulse 78   Temp (!) 97 1 °F (36 2 °C) (Tympanic)   Ht 5' 3" (1 6 m)   Wt 88 kg (194 lb)   LMP  (LMP Unknown)   SpO2 97%   BMI 34 37 kg/m²        Physical Exam     Physical Exam  Constitutional:       Appearance: She is well-developed  HENT:      Right Ear: Ear canal normal  Tympanic membrane is not injected        Left Ear: Ear canal normal  Tympanic membrane is not injected  Nose: Nose normal    Eyes:      General:         Right eye: No discharge  Left eye: No discharge  Conjunctiva/sclera: Conjunctivae normal       Pupils: Pupils are equal, round, and reactive to light  Neck:      Thyroid: No thyromegaly  Cardiovascular:      Rate and Rhythm: Normal rate and regular rhythm  Heart sounds: Normal heart sounds  No murmur heard  Pulmonary:      Effort: Pulmonary effort is normal  No respiratory distress  Breath sounds: Normal breath sounds  No wheezing  Abdominal:      General: Bowel sounds are normal  There is no distension  Palpations: Abdomen is soft  Tenderness: There is no abdominal tenderness  Musculoskeletal:         General: Normal range of motion  Cervical back: Normal range of motion and neck supple  Lymphadenopathy:      Cervical: No cervical adenopathy  Skin:     General: Skin is warm and dry  Neurological:      Mental Status: She is alert and oriented to person, place, and time  She is not disoriented  Sensory: No sensory deficit  Gait: Gait normal       Deep Tendon Reflexes: Reflexes are normal and symmetric  Psychiatric:         Speech: Speech normal          Behavior: Behavior normal          Thought Content:  Thought content normal          Judgment: Judgment normal          C/w L impingement syndr

## 2021-09-27 ENCOUNTER — TELEPHONE (OUTPATIENT)
Dept: FAMILY MEDICINE CLINIC | Facility: CLINIC | Age: 61
End: 2021-09-27

## 2021-09-27 NOTE — TELEPHONE ENCOUNTER
----- Message from Audrey García MD sent at 9/26/2021  8:36 AM EDT -----  Calcific tendonitis of rotator cuff--hopefully injection/PT effective--refer ortho if cont

## 2021-09-30 DIAGNOSIS — M25.512 LEFT SHOULDER PAIN, UNSPECIFIED CHRONICITY: Primary | ICD-10-CM

## 2021-09-30 NOTE — PROGRESS NOTES
Patient informed of [facility] results  Still having severe pain and want to be referred to other   Order placed

## 2021-10-15 ENCOUNTER — TELEPHONE (OUTPATIENT)
Dept: FAMILY MEDICINE CLINIC | Facility: CLINIC | Age: 61
End: 2021-10-15

## 2021-10-15 DIAGNOSIS — E03.8 HYPOTHYROIDISM DUE TO HASHIMOTO'S THYROIDITIS: Primary | ICD-10-CM

## 2021-10-15 DIAGNOSIS — E06.3 HYPOTHYROIDISM DUE TO HASHIMOTO'S THYROIDITIS: Primary | ICD-10-CM

## 2021-10-15 RX ORDER — LEVOTHYROXINE SODIUM 112 MCG
112 TABLET ORAL
Qty: 90 TABLET | Refills: 3 | Status: SHIPPED | OUTPATIENT
Start: 2021-10-15

## 2021-10-25 DIAGNOSIS — L30.9 DERMATITIS: Primary | ICD-10-CM

## 2021-10-25 RX ORDER — TRIAMCINOLONE ACETONIDE 5 MG/G
CREAM TOPICAL 3 TIMES DAILY
Qty: 45 G | Refills: 5 | Status: SHIPPED | OUTPATIENT
Start: 2021-10-25

## 2021-10-27 ENCOUNTER — HOSPITAL ENCOUNTER (OUTPATIENT)
Dept: MAMMOGRAPHY | Facility: IMAGING CENTER | Age: 61
Discharge: HOME | End: 2021-10-27
Payer: COMMERCIAL

## 2021-10-27 VITALS — BODY MASS INDEX: 34.38 KG/M2 | WEIGHT: 194 LBS | HEIGHT: 63 IN

## 2021-10-27 DIAGNOSIS — Z12.31 ENCOUNTER FOR SCREENING MAMMOGRAM FOR MALIGNANT NEOPLASM OF BREAST: ICD-10-CM

## 2021-10-27 PROCEDURE — 77067 SCR MAMMO BI INCL CAD: CPT

## 2021-10-27 PROCEDURE — 77063 BREAST TOMOSYNTHESIS BI: CPT

## 2021-12-29 DIAGNOSIS — J01.00 ACUTE NON-RECURRENT MAXILLARY SINUSITIS: Primary | ICD-10-CM

## 2021-12-29 RX ORDER — AZITHROMYCIN 250 MG/1
TABLET, FILM COATED ORAL
Qty: 6 TABLET | Refills: 0 | Status: SHIPPED | OUTPATIENT
Start: 2021-12-29 | End: 2022-01-02

## 2022-02-19 LAB
ALBUMIN SERPL-MCNC: 4.2 G/DL (ref 3.8–4.9)
ALBUMIN/GLOB SERPL: 1.7 {RATIO} (ref 1.2–2.2)
ALP SERPL-CCNC: 72 IU/L (ref 44–121)
ALT SERPL-CCNC: 18 IU/L (ref 0–32)
AST SERPL-CCNC: 23 IU/L (ref 0–40)
BASOPHILS # BLD AUTO: 0.1 X10E3/UL (ref 0–0.2)
BASOPHILS NFR BLD AUTO: 1 %
BILIRUB SERPL-MCNC: 0.4 MG/DL (ref 0–1.2)
BUN SERPL-MCNC: 14 MG/DL (ref 8–27)
BUN/CREAT SERPL: 17 (ref 12–28)
CALCIUM SERPL-MCNC: 9.3 MG/DL (ref 8.7–10.3)
CHLORIDE SERPL-SCNC: 105 MMOL/L (ref 96–106)
CHOLEST SERPL-MCNC: 193 MG/DL (ref 100–199)
CO2 SERPL-SCNC: 19 MMOL/L (ref 20–29)
CREAT SERPL-MCNC: 0.84 MG/DL (ref 0.57–1)
EOSINOPHIL # BLD AUTO: 0.1 X10E3/UL (ref 0–0.4)
EOSINOPHIL NFR BLD AUTO: 3 %
ERYTHROCYTE [DISTWIDTH] IN BLOOD BY AUTOMATED COUNT: 13.2 % (ref 11.7–15.4)
GLOBULIN SER-MCNC: 2.5 G/DL (ref 1.5–4.5)
GLUCOSE SERPL-MCNC: 110 MG/DL (ref 65–99)
HBA1C MFR BLD: 6 % (ref 4.8–5.6)
HCT VFR BLD AUTO: 41 % (ref 34–46.6)
HDLC SERPL-MCNC: 60 MG/DL
HGB BLD-MCNC: 13.3 G/DL (ref 11.1–15.9)
IMM GRANULOCYTES # BLD: 0 X10E3/UL (ref 0–0.1)
IMM GRANULOCYTES NFR BLD: 0 %
LDLC SERPL CALC-MCNC: 118 MG/DL (ref 0–99)
LDLC/HDLC SERPL: 2 RATIO (ref 0–3.2)
LYMPHOCYTES # BLD AUTO: 1.3 X10E3/UL (ref 0.7–3.1)
LYMPHOCYTES NFR BLD AUTO: 27 %
MCH RBC QN AUTO: 27.5 PG (ref 26.6–33)
MCHC RBC AUTO-ENTMCNC: 32.4 G/DL (ref 31.5–35.7)
MCV RBC AUTO: 85 FL (ref 79–97)
MONOCYTES # BLD AUTO: 0.5 X10E3/UL (ref 0.1–0.9)
MONOCYTES NFR BLD AUTO: 10 %
NEUTROPHILS # BLD AUTO: 2.7 X10E3/UL (ref 1.4–7)
NEUTROPHILS NFR BLD AUTO: 59 %
PLATELET # BLD AUTO: 215 X10E3/UL (ref 150–450)
POTASSIUM SERPL-SCNC: 4.3 MMOL/L (ref 3.5–5.2)
PROT SERPL-MCNC: 6.7 G/DL (ref 6–8.5)
RBC # BLD AUTO: 4.83 X10E6/UL (ref 3.77–5.28)
SL AMB EGFR AFRICAN AMERICAN: 87 ML/MIN/1.73
SL AMB EGFR NON AFRICAN AMERICAN: 76 ML/MIN/1.73
SL AMB VLDL CHOLESTEROL CALC: 15 MG/DL (ref 5–40)
SODIUM SERPL-SCNC: 140 MMOL/L (ref 134–144)
T4 FREE SERPL-MCNC: 1.98 NG/DL (ref 0.82–1.77)
TRIGL SERPL-MCNC: 80 MG/DL (ref 0–149)
TSH SERPL DL<=0.005 MIU/L-ACNC: 0.44 UIU/ML (ref 0.45–4.5)
WBC # BLD AUTO: 4.6 X10E3/UL (ref 3.4–10.8)

## 2022-02-22 ENCOUNTER — TELEPHONE (OUTPATIENT)
Dept: FAMILY MEDICINE CLINIC | Facility: CLINIC | Age: 62
End: 2022-02-22

## 2022-02-22 NOTE — TELEPHONE ENCOUNTER
----- Message from Ermias Landry MD sent at 2/20/2022  7:39 AM EST -----  Thyroid sl over-replaced--decr sl--see TW after disc w/ pt

## 2022-03-09 DIAGNOSIS — F41.9 ANXIETY: Primary | ICD-10-CM

## 2022-03-09 RX ORDER — LORAZEPAM 0.5 MG/1
0.5 TABLET ORAL EVERY 8 HOURS PRN
Qty: 60 TABLET | Refills: 3 | Status: SHIPPED | OUTPATIENT
Start: 2022-03-09

## 2022-03-15 DIAGNOSIS — J01.00 ACUTE NON-RECURRENT MAXILLARY SINUSITIS: Primary | ICD-10-CM

## 2022-03-15 RX ORDER — AZITHROMYCIN 250 MG/1
TABLET, FILM COATED ORAL
Qty: 6 TABLET | Refills: 0 | Status: SHIPPED | OUTPATIENT
Start: 2022-03-15 | End: 2022-03-19

## 2022-05-26 DIAGNOSIS — E03.8 HYPOTHYROIDISM DUE TO HASHIMOTO'S THYROIDITIS: Primary | ICD-10-CM

## 2022-05-26 DIAGNOSIS — E06.3 HYPOTHYROIDISM DUE TO HASHIMOTO'S THYROIDITIS: Primary | ICD-10-CM

## 2022-05-26 DIAGNOSIS — E55.9 VITAMIN D DEFICIENCY: ICD-10-CM

## 2022-05-26 NOTE — PROGRESS NOTES
Had labs 2/2022  Yearly scheduled for 8/1/22- will repeat TSH/T3/T4, pt also requesting ViT D  Sent to [place]

## 2022-06-28 NOTE — PROGRESS NOTES
[facility]        NAME: Broderick Metzger is a 62 y o  female  : 1960    MRN: [id number]  DATE: 2020  TIME: 10:22 AM    Assessment and Plan   Need for vaccination [Z23]  1  Need for vaccination  TDAP VACCINE GREATER THAN OR EQUAL TO 6YO IM   2  Prolonged QT syndrome     3  Essential hypertension  zolpidem (AMBIEN) 10 mg tablet   4  Hypothyroidism, unspecified type     5  Wellness examination     6  Close exposure to COVID-19 virus  SARS CoV 2 SEROLOGY (COVID 19) AB (IGG), [place]   7  Asthma with acute exacerbation, unspecified asthma severity, unspecified whether persistent  albuterol (ProAir HFA) 90 mcg/act inhaler         Patient Instructions     Patient Instructions   Same meds          Chief Complaint     Chief Complaint   Patient presents with    Follow-up     MM/labs    Annual Exam     HM         History of Present Illness       F/u assessed med cond--stable      Review of Systems   Review of Systems   Constitutional: Negative for fatigue, fever and unexpected weight change  HENT: Negative for congestion, sinus pain and sore throat  Eyes: Negative for visual disturbance  Respiratory: Negative for shortness of breath and wheezing  Cardiovascular: Negative for chest pain and palpitations  Gastrointestinal: Negative for abdominal pain, nausea and vomiting  Musculoskeletal: Negative  Negative for arthralgias and myalgias  Neurological: Negative for syncope, weakness and numbness  Psychiatric/Behavioral: Negative  Negative for confusion, dysphoric mood and suicidal ideas           Current Medications       Current Outpatient Medications:     albuterol (ProAir HFA) 90 mcg/act inhaler, Inhale 1 puff every 4 (four) hours as needed for wheezing, Disp: 3 Inhaler, Rfl: 3    enalapril (VASOTEC) 10 mg tablet, Take 1 tablet (10 mg total) by mouth daily, Disp: 90 tablet, Rfl: 3    metoprolol succinate (TOPROL-XL) 50 mg 24 hr tablet, Take 1 tablet (50 mg total) by mouth daily, Disp: 90 tablet, Rfl: 3    pravastatin (PRAVACHOL) 40 mg tablet, Take 40 mg by mouth daily, Disp: , Rfl:     XARELTO 20 MG tablet, Take 20 mg by mouth every evening, Disp: , Rfl:     zolpidem (AMBIEN) 10 mg tablet, Take 1 tablet (10 mg total) by mouth as needed for sleep, Disp: 30 tablet, Rfl: 5    levothyroxine 125 mcg tablet, Take 1 tablet (125 mcg total) by mouth daily, Disp: 30 tablet, Rfl: 1    Current Allergies     Allergies as of 2020    (No Known Allergies)            The following portions of the patient's history were reviewed and updated as appropriate: allergies, current medications, past family history, past medical history, past social history, past surgical history and problem list      Past Medical History:   Diagnosis Date    Disease of thyroid gland     hypo    GERD (gastroesophageal reflux disease)     Hypertension     Prolonged QT interval        Past Surgical History:   Procedure Laterality Date    BACK SURGERY       SECTION      CHOLECYSTECTOMY      HYSTERECTOMY      age 44    KNEE ARTHROPLASTY      PANNICULECTOMY      last assessed: 2013    SALPINGOOPHORECTOMY Right     age 44 with hysterectomy       Family History   Problem Relation Age of Onset    Hypertension Mother     Pancreatic cancer Mother 61    Hypertension Father     No Known Problems Sister     No Known Problems Daughter     No Known Problems Maternal Grandmother     No Known Problems Maternal Grandfather     No Known Problems Paternal Grandmother     No Known Problems Paternal Grandfather     No Known Problems Daughter     No Known Problems Paternal Aunt     No Known Problems Paternal Aunt     No Known Problems Paternal Aunt     No Known Problems Paternal Aunt     No Known Problems Paternal Aunt     Substance Abuse Neg Hx     Mental illness Neg Hx          Medications have been verified          Objective   /72   Pulse 71   Temp (!) 97 4 °F (36 3 °C) (Temporal)   Ht 5' 3 5" (1 613 m)   Wt 84 7 kg (186 lb 12 8 oz)   LMP  (LMP Unknown)   SpO2 98%   BMI 32 57 kg/m²        Physical Exam     Physical Exam   Constitutional: She is oriented to person, place, and time  Vital signs are normal  She appears well-developed and well-nourished  HENT:   Right Ear: Ear canal normal  Tympanic membrane is not injected  Left Ear: Ear canal normal  Tympanic membrane is not injected  Nose: Nose normal    Mouth/Throat: Oropharynx is clear and moist    Eyes: Pupils are equal, round, and reactive to light  Conjunctivae and EOM are normal  Right eye exhibits no discharge  Left eye exhibits no discharge  Neck: Normal range of motion  Neck supple  No thyromegaly present  Cardiovascular: Normal rate, regular rhythm and normal heart sounds  No murmur heard  Pulmonary/Chest: Effort normal and breath sounds normal  No respiratory distress  She has no wheezes  Abdominal: Soft  Bowel sounds are normal  She exhibits no distension  There is no tenderness  Musculoskeletal: Normal range of motion  Lymphadenopathy:     She has no cervical adenopathy  Neurological: She is alert and oriented to person, place, and time  She has normal strength and normal reflexes  She is not disoriented  No sensory deficit  Gait normal    Skin: Skin is warm and dry  Psychiatric: She has a normal mood and affect   Her speech is normal and behavior is normal  Judgment and thought content normal  Cognition and memory are normal  Awake/Alert/Cooperative

## 2022-07-21 LAB
25(OH)D3+25(OH)D2 SERPL-MCNC: 33.5 NG/ML (ref 30–100)
SL AMB T4, FREE (DIRECT): 1.51 NG/DL (ref 0.82–1.77)
T3FREE SERPL-MCNC: 2.3 PG/ML (ref 2–4.4)
TSH SERPL DL<=0.005 MIU/L-ACNC: 10.8 UIU/ML (ref 0.45–4.5)

## 2022-08-01 ENCOUNTER — OFFICE VISIT (OUTPATIENT)
Dept: FAMILY MEDICINE CLINIC | Facility: CLINIC | Age: 62
End: 2022-08-01
Payer: COMMERCIAL

## 2022-08-01 VITALS
WEIGHT: 197 LBS | DIASTOLIC BLOOD PRESSURE: 78 MMHG | HEIGHT: 63 IN | BODY MASS INDEX: 34.91 KG/M2 | HEART RATE: 73 BPM | OXYGEN SATURATION: 99 % | RESPIRATION RATE: 16 BRPM | SYSTOLIC BLOOD PRESSURE: 126 MMHG

## 2022-08-01 DIAGNOSIS — Z00.00 WELLNESS EXAMINATION: ICD-10-CM

## 2022-08-01 DIAGNOSIS — Z12.31 ENCOUNTER FOR SCREENING MAMMOGRAM FOR BREAST CANCER: Primary | ICD-10-CM

## 2022-08-01 DIAGNOSIS — E03.9 HYPOTHYROIDISM, UNSPECIFIED TYPE: ICD-10-CM

## 2022-08-01 DIAGNOSIS — E78.2 HYPERLIPEMIA, MIXED: ICD-10-CM

## 2022-08-01 DIAGNOSIS — I10 PRIMARY HYPERTENSION: ICD-10-CM

## 2022-08-01 PROCEDURE — 99396 PREV VISIT EST AGE 40-64: CPT | Performed by: FAMILY MEDICINE

## 2022-08-01 PROCEDURE — 99214 OFFICE O/P EST MOD 30 MIN: CPT | Performed by: FAMILY MEDICINE

## 2022-08-01 PROCEDURE — 3725F SCREEN DEPRESSION PERFORMED: CPT | Performed by: FAMILY MEDICINE

## 2022-08-01 RX ORDER — RIVAROXABAN 20 MG/1
20 TABLET, FILM COATED ORAL EVERY EVENING
COMMUNITY
Start: 2022-06-20

## 2022-08-01 RX ORDER — METOPROLOL SUCCINATE 50 MG/1
50 TABLET, EXTENDED RELEASE ORAL DAILY
COMMUNITY
Start: 2022-06-27

## 2022-08-01 RX ORDER — OMEPRAZOLE 40 MG/1
40 CAPSULE, DELAYED RELEASE ORAL 2 TIMES DAILY
COMMUNITY
Start: 2022-06-27

## 2022-08-01 RX ORDER — PRAVASTATIN SODIUM 40 MG
40 TABLET ORAL DAILY
COMMUNITY
Start: 2022-06-18

## 2022-08-01 NOTE — PROGRESS NOTES
Assessment/Plan:    No problem-specific Assessment & Plan notes found for this encounter  Diagnoses and all orders for this visit:    Encounter for screening mammogram for breast cancer  -     Mammo screening bilateral w 3d & cad; Future    Hypothyroidism, unspecified type    Wellness examination    Primary hypertension    Other orders  -     metoprolol succinate (TOPROL-XL) 50 mg 24 hr tablet; Take 50 mg by mouth daily  -     omeprazole (PriLOSEC) 40 MG capsule; Take 40 mg by mouth 2 (two) times a day  -     pravastatin (PRAVACHOL) 40 mg tablet; Take 40 mg by mouth daily  -     Xarelto 20 MG tablet; Take 20 mg by mouth every evening          Subjective:   Chief Complaint   Patient presents with    Physical Exam        Patient ID: Lexi Farr is a 65 y o  female  HPI    The following portions of the patient's history were reviewed and updated as appropriate: allergies, current medications, past family history, past medical history, past social history, past surgical history and problem list     Review of Systems   Constitutional: Negative for fatigue, fever and unexpected weight change  HENT: Negative for congestion, sinus pain and sore throat  Eyes: Negative for visual disturbance  Respiratory: Negative for shortness of breath and wheezing  Cardiovascular: Negative for chest pain and palpitations  Gastrointestinal: Negative for abdominal pain, nausea and vomiting  Musculoskeletal: Negative  Negative for arthralgias and myalgias  Neurological: Negative for syncope, weakness and numbness  Psychiatric/Behavioral: Negative  Negative for confusion, dysphoric mood and suicidal ideas  Objective:  Vitals:    08/01/22 0720   BP: 126/78   Pulse: 73   Resp: 16   SpO2: 99%   Weight: 89 4 kg (197 lb)   Height: 5' 3" (1 6 m)      Physical Exam  Constitutional:       Appearance: She is well-developed  HENT:      Right Ear: Ear canal normal  Tympanic membrane is not injected        Left Ear: Ear canal normal  Tympanic membrane is not injected  Nose: Nose normal    Eyes:      General:         Right eye: No discharge  Left eye: No discharge  Conjunctiva/sclera: Conjunctivae normal       Pupils: Pupils are equal, round, and reactive to light  Neck:      Thyroid: No thyromegaly  Cardiovascular:      Rate and Rhythm: Normal rate and regular rhythm  Heart sounds: Normal heart sounds  No murmur heard  Pulmonary:      Effort: Pulmonary effort is normal  No respiratory distress  Breath sounds: Normal breath sounds  No wheezing  Abdominal:      General: Bowel sounds are normal  There is no distension  Palpations: Abdomen is soft  Tenderness: There is no abdominal tenderness  Musculoskeletal:         General: Normal range of motion  Cervical back: Normal range of motion and neck supple  Lymphadenopathy:      Cervical: No cervical adenopathy  Skin:     General: Skin is warm and dry  Neurological:      Mental Status: She is alert and oriented to person, place, and time  She is not disoriented  Sensory: No sensory deficit  Gait: Gait normal       Deep Tendon Reflexes: Reflexes are normal and symmetric  Psychiatric:         Speech: Speech normal          Behavior: Behavior normal          Thought Content:  Thought content normal          Judgment: Judgment normal

## 2022-08-01 NOTE — PROGRESS NOTES
HPI:  Marii Cabrera is a 65 y o  female here for her yearly health maintenance exam    Patient Active Problem List   Diagnosis    Hypertension    Hypothyroidism     Past Medical History:   Diagnosis Date    Anxiety 2020    On and off    Cancer ([facility] [address] ) Basal cell    Clotting disorder ([facility] ) on Anticoagulant therapy    Coronary artery disease 08/11/2018    Stent/Afib    Disease of thyroid gland     hypo    GERD (gastroesophageal reflux disease)     Hypertension     Prolonged QT interval        1  Advanced Directive: n     2  Durable Power of  for Healthcare: n     3  Social History:           Drug and alcohol History: n                  4  Immunizations up to date: y                 Lifestyle:                           Healthy Diet:y                          Alcohol Use:n                          Tobacco Use:n                          Regular exercise:y                          Weight concerns:y                               5   Over the past 2 weeks, how often have you been bothered by the following:              Little interest or pleasure in doing things:n              Felling down, depressed or hopeless:n       Current Outpatient Medications   Medication Sig Dispense Refill    enalapril (VASOTEC) 10 mg tablet       flecainide (TAMBOCOR) 50 mg tablet TAKE 1 TABLET AS NEEDED FOR ATRIAL FIBRILLATION, MAY REPEAT X1 IN 30 MINUTES IF NEEDED ORALLY      LORazepam (Ativan) 0 5 mg tablet Take 1 tablet (0 5 mg total) by mouth every 8 (eight) hours as needed for anxiety 60 tablet 3    metoprolol succinate (TOPROL-XL) 50 mg 24 hr tablet Take 50 mg by mouth daily      omeprazole (PriLOSEC) 40 MG capsule Take 40 mg by mouth 2 (two) times a day      pravastatin (PRAVACHOL) 40 mg tablet Take 40 mg by mouth daily      Synthroid 112 MCG tablet Take 1 tablet (112 mcg total) by mouth daily in the early morning 90 tablet 3    triamcinolone (KENALOG) 0 5 % cream Apply topically 3 (three) times a day 45 g 5    Xarelto 20 MG tablet Take 20 mg by mouth every evening      zolpidem (AMBIEN) 10 mg tablet Take 1 tablet (10 mg total) by mouth daily at bedtime as needed for sleep 30 tablet 5     No current facility-administered medications for this visit  No Known Allergies  Immunization History   Administered Date(s) Administered    COVID-19 MODERNA VACC 0 5 ML IM 01/06/2021, 02/05/2021    H1N1, All Formulations 10/23/2009    INFLUENZA 09/29/2018, 09/28/2019, 10/24/2020, 10/15/2021    Influenza Quadrivalent Preservative Free 3 years and older IM 10/22/2017    Influenza, seasonal, injectable 10/15/2016    Tdap 03/22/2011, 07/27/2020       Patient Care Team:  Kanu Benitez MD as PCP - General    Review of Systems   Constitutional: Negative for fatigue, fever and unexpected weight change  HENT: Negative for congestion, sinus pain and sore throat  Eyes: Negative for visual disturbance  Respiratory: Negative for shortness of breath and wheezing  Cardiovascular: Negative for chest pain and palpitations  Gastrointestinal: Negative for abdominal pain, nausea and vomiting  Musculoskeletal: Negative  Negative for arthralgias and myalgias  Neurological: Negative for syncope, weakness and numbness  Psychiatric/Behavioral: Negative  Negative for confusion, dysphoric mood and suicidal ideas  Physical Exam :  Physical Exam  Constitutional:       Appearance: She is well-developed  HENT:      Right Ear: Ear canal normal  Tympanic membrane is not injected  Left Ear: Ear canal normal  Tympanic membrane is not injected  Nose: Nose normal    Eyes:      General:         Right eye: No discharge  Left eye: No discharge  Conjunctiva/sclera: Conjunctivae normal       Pupils: Pupils are equal, round, and reactive to light  Neck:      Thyroid: No thyromegaly  Cardiovascular:      Rate and Rhythm: Normal rate and regular rhythm  Heart sounds: Normal heart sounds   No murmur heard   Pulmonary:      Effort: Pulmonary effort is normal  No respiratory distress  Breath sounds: Normal breath sounds  No wheezing  Abdominal:      General: Bowel sounds are normal  There is no distension  Palpations: Abdomen is soft  Tenderness: There is no abdominal tenderness  Musculoskeletal:         General: Normal range of motion  Cervical back: Normal range of motion and neck supple  Lymphadenopathy:      Cervical: No cervical adenopathy  Skin:     General: Skin is warm and dry  Neurological:      Mental Status: She is alert and oriented to person, place, and time  She is not disoriented  Sensory: No sensory deficit  Gait: Gait normal       Deep Tendon Reflexes: Reflexes are normal and symmetric  Psychiatric:         Speech: Speech normal          Behavior: Behavior normal          Thought Content: Thought content normal          Judgment: Judgment normal            Assessment and Plan:  1  Encounter for screening mammogram for breast cancer  Mammo screening bilateral w 3d & cad   2  Hypothyroidism, unspecified type     3  Wellness examination     4   Primary hypertension         Health Maintenance Due   Topic Date Due    HIV Screening  Never done    BMI: Followup Plan  Never done    Cervical Cancer Screening  Never done    COVID-19 Vaccine (3 - Booster for Moderna series) 07/05/2021    Influenza Vaccine (1) 09/01/2022    Colorectal Cancer Screening  09/11/2022    Breast Cancer Screening: Mammogram  10/27/2022

## 2022-08-21 ENCOUNTER — HOSPITAL ENCOUNTER (OUTPATIENT)
Dept: ULTRASOUND IMAGING | Facility: HOSPITAL | Age: 62
Discharge: HOME | End: 2022-08-21
Payer: COMMERCIAL

## 2022-08-21 DIAGNOSIS — R10.811 RIGHT UPPER QUADRANT ABDOMINAL TENDERNESS: ICD-10-CM

## 2022-08-21 PROCEDURE — 76700 US EXAM ABDOM COMPLETE: CPT

## 2022-08-25 ENCOUNTER — PATIENT MESSAGE (OUTPATIENT)
Dept: FAMILY MEDICINE CLINIC | Facility: CLINIC | Age: 62
End: 2022-08-25

## 2022-08-26 NOTE — PATIENT COMMUNICATION
Pt called in again to request the rx from TW  Pt advised that TW is not in the office today, will return Monday, 08/29/2022  Pt declined OV with Yumi tomorrow, 08/27/2022 but scheduled her  to come in, who also has ear pain  Pt's  is sched for OV on Saturday, 08/27/2022 with Yumi

## 2022-08-28 ENCOUNTER — PATIENT MESSAGE (OUTPATIENT)
Dept: FAMILY MEDICINE CLINIC | Facility: CLINIC | Age: 62
End: 2022-08-28

## 2022-08-28 DIAGNOSIS — E03.8 HYPOTHYROIDISM DUE TO HASHIMOTO'S THYROIDITIS: ICD-10-CM

## 2022-08-28 DIAGNOSIS — E06.3 HYPOTHYROIDISM DUE TO HASHIMOTO'S THYROIDITIS: ICD-10-CM

## 2022-08-29 RX ORDER — LEVOTHYROXINE SODIUM 112 MCG
112 TABLET ORAL
Qty: 90 TABLET | Refills: 3 | Status: SHIPPED | OUTPATIENT
Start: 2022-08-29

## 2022-08-29 RX ORDER — LEVOTHYROXINE SODIUM 112 MCG
112 TABLET ORAL
Qty: 90 TABLET | Refills: 3 | Status: SHIPPED | OUTPATIENT
Start: 2022-08-29 | End: 2022-08-29 | Stop reason: SDUPTHER

## 2022-08-29 NOTE — TELEPHONE ENCOUNTER
From: Jayla Villagran  To:  Humberto Diallo MD  Sent: 8/28/2022 6:13 PM EDT  Subject: Synthroid refill    Please send 112mcg to [address]

## 2022-10-04 DIAGNOSIS — R06.2 WHEEZE: Primary | ICD-10-CM

## 2022-10-04 RX ORDER — ALBUTEROL SULFATE 90 UG/1
2 AEROSOL, METERED RESPIRATORY (INHALATION) EVERY 6 HOURS PRN
Qty: 8 G | Refills: 5 | Status: SHIPPED | OUTPATIENT
Start: 2022-10-04

## 2022-11-01 ENCOUNTER — HOSPITAL ENCOUNTER (OUTPATIENT)
Dept: MAMMOGRAPHY | Facility: IMAGING CENTER | Age: 62
Discharge: HOME | End: 2022-11-01

## 2022-11-01 VITALS — WEIGHT: 195 LBS | BODY MASS INDEX: 34.55 KG/M2 | HEIGHT: 63 IN

## 2022-11-01 DIAGNOSIS — Z12.31 ENCOUNTER FOR SCREENING MAMMOGRAM FOR BREAST CANCER: ICD-10-CM

## 2022-11-08 LAB
ALBUMIN SERPL-MCNC: 4.4 G/DL (ref 3.8–4.8)
ALBUMIN/GLOB SERPL: 1.9 {RATIO} (ref 1.2–2.2)
ALP SERPL-CCNC: 68 IU/L (ref 44–121)
ALT SERPL-CCNC: 19 IU/L (ref 0–32)
AST SERPL-CCNC: 21 IU/L (ref 0–40)
BILIRUB SERPL-MCNC: 0.3 MG/DL (ref 0–1.2)
BUN SERPL-MCNC: 21 MG/DL (ref 8–27)
BUN/CREAT SERPL: 24 (ref 12–28)
CALCIUM SERPL-MCNC: 9.7 MG/DL (ref 8.7–10.3)
CHLORIDE SERPL-SCNC: 107 MMOL/L (ref 96–106)
CHOLEST SERPL-MCNC: 174 MG/DL (ref 100–199)
CO2 SERPL-SCNC: 19 MMOL/L (ref 20–29)
CREAT SERPL-MCNC: 0.87 MG/DL (ref 0.57–1)
EGFR: 76 ML/MIN/1.73
GLOBULIN SER-MCNC: 2.3 G/DL (ref 1.5–4.5)
GLUCOSE SERPL-MCNC: 121 MG/DL (ref 70–99)
HDLC SERPL-MCNC: 56 MG/DL
LDLC SERPL CALC-MCNC: 100 MG/DL (ref 0–99)
LDLC/HDLC SERPL: 1.8 RATIO (ref 0–3.2)
POTASSIUM SERPL-SCNC: 4.2 MMOL/L (ref 3.5–5.2)
PROT SERPL-MCNC: 6.7 G/DL (ref 6–8.5)
SL AMB VLDL CHOLESTEROL CALC: 18 MG/DL (ref 5–40)
SODIUM SERPL-SCNC: 144 MMOL/L (ref 134–144)
T3FREE SERPL-MCNC: 2.9 PG/ML (ref 2–4.4)
T4 FREE SERPL-MCNC: 1.91 NG/DL (ref 0.82–1.77)
TRIGL SERPL-MCNC: 102 MG/DL (ref 0–149)
TSH SERPL DL<=0.005 MIU/L-ACNC: 0.88 UIU/ML (ref 0.45–4.5)

## 2022-11-09 ENCOUNTER — TELEPHONE (OUTPATIENT)
Dept: FAMILY MEDICINE CLINIC | Facility: CLINIC | Age: 62
End: 2022-11-09

## 2022-11-09 DIAGNOSIS — E34.9 ENDOCRINE DISORDER: Primary | ICD-10-CM

## 2022-11-09 DIAGNOSIS — E78.2 HYPERLIPEMIA, MIXED: ICD-10-CM

## 2022-11-09 DIAGNOSIS — E03.9 HYPOTHYROIDISM, UNSPECIFIED TYPE: Primary | ICD-10-CM

## 2022-11-09 NOTE — TELEPHONE ENCOUNTER
----- Message from Tavares Adams MD sent at 11/8/2022  4:43 PM EST -----  Thyroid at high end but OK if pt asymptomatic--repeat 3-6mos

## 2022-11-09 NOTE — TELEPHONE ENCOUNTER
1st attempt to contact pt -  11/09/2022 @ 9:37AM    I left a VM advising patient to call the office for recent lab resutls and Dr Angie Hussein advice  Thyroid studies lab orders sent to [facility]     Advise patient to complete non-fasted, 3-6 mos

## 2023-03-04 ENCOUNTER — NURSE TRIAGE (OUTPATIENT)
Dept: OTHER | Facility: OTHER | Age: 63
End: 2023-03-04

## 2023-03-04 ENCOUNTER — TELEMEDICINE (OUTPATIENT)
Dept: FAMILY MEDICINE CLINIC | Facility: CLINIC | Age: 63
End: 2023-03-04

## 2023-03-04 DIAGNOSIS — U07.1 COVID-19: Primary | ICD-10-CM

## 2023-03-04 NOTE — PROGRESS NOTES
COVID-19 Outpatient Progress Note    Assessment/Plan:    Problem List Items Addressed This Visit    None  Visit Diagnoses     COVID-19    -  Primary         Disposition:     Discussed symptom directed medication options with patient  Discussed vitamin D, vitamin C, and/or zinc supplementation with patient  I have spent a total time of 15 minutes on the day of the encounter for this patient including instructions for management and patient and family education  Discussed Paxlovid- will hold off due to potential drug interaction      Encounter provider: Davi Carty MD     Provider located at: [address] [place] [id number]     Recent Visits  No visits were found meeting these conditions  Showing recent visits within past 7 days and meeting all other requirements  Today's Visits  Date Type Provider Dept   03/04/23 Telemedicine Davi Carty MD [address] today's visits and meeting all other requirements  Future Appointments  No visits were found meeting these conditions  Showing future appointments within next 150 days and meeting all other requirements     This virtual check-in was done via [employer] [facility] [address] and patient was informed that this is a secure, HIPAA-compliant platform  She agrees to proceed  Patient agrees to participate in a virtual check in via telephone or video visit instead of presenting to the office to address urgent/immediate medical needs  Patient is aware this is a billable service  She acknowledged consent and understanding of privacy and security of the video platform  The patient has agreed to participate and understands they can discontinue the visit at any time  After connecting through [facility], the patient was identified by name and date of birth   Manjeet Moeller was informed that this was a telemedicine visit and that the exam was being conducted confidentially over secure lines  No one else was in the room  Miri Rivera acknowledged consent and understanding of privacy and security of the telemedicine visit  I informed the patient that I have reviewed her record in Epic and presented the opportunity for her to ask any questions regarding the visit today  The patient agreed to participate  Verification of patient location:  Patient is located in the following state in which I hold an active license: [place]    Subjective:   Miri Rivera is a 65 y o  female who is concerned about COVID-19  Patient's symptoms include fever, chills, fatigue, malaise, nasal congestion, loss of taste, cough, shortness of breath, chest tightness and myalgias  Patient denies nausea, vomiting and diarrhea  - Date of symptom onset: 3/3/2023      COVID-19 vaccination status: Fully vaccinated (primary series)    Exposure:   Contact with a person who is under investigation (PUI) for or who is positive for COVID-19 within the last 14 days?: No    Hospitalized recently for fever and/or lower respiratory symptoms?: No      Currently a healthcare worker that is involved in direct patient care?: No      Works in a special setting where the risk of COVID-19 transmission may be high? (this may include long-term care, correctional and care home facilities; homeless shelters; assisted-living facilities and group homes ): No      Resident in a special setting where the risk of COVID-19 transmission may be high? (this may include long-term care, correctional and care home facilities; homeless shelters; assisted-living facilities and group homes ): No      No results found for: [address] [facility], [address] [facility], [address] [facility], COR[facility], 350 [facility], 700 [facility]    Review of Systems   Constitutional: Positive for chills, fatigue and fever  HENT: Positive for congestion  Respiratory: Positive for cough, chest tightness and shortness of breath  Gastrointestinal: Negative for diarrhea, nausea and vomiting  Musculoskeletal: Positive for myalgias  Current Outpatient Medications on File Prior to Visit   Medication Sig   • albuterol (ProAir HFA) 90 mcg/act inhaler Inhale 2 puffs every 6 (six) hours as needed for wheezing   • enalapril (VASOTEC) 10 mg tablet    • flecainide (TAMBOCOR) 50 mg tablet TAKE 1 TABLET AS NEEDED FOR ATRIAL FIBRILLATION, MAY REPEAT X1 IN 30 MINUTES IF NEEDED ORALLY   • LORazepam (ATIVAN) 0 5 mg tablet TAKE 1 TABLET BY MOUTH EVERY 8 HOURS AS NEEDED FOR ANXIETY     • metoprolol succinate (TOPROL-XL) 50 mg 24 hr tablet Take 50 mg by mouth daily   • omeprazole (PriLOSEC) 40 MG capsule Take 40 mg by mouth 2 (two) times a day   • pravastatin (PRAVACHOL) 40 mg tablet Take 40 mg by mouth daily   • Synthroid 112 MCG tablet Take 1 tablet (112 mcg total) by mouth daily in the early morning   • triamcinolone (KENALOG) 0 5 % cream Apply topically 3 (three) times a day   • Xarelto 20 MG tablet Take 20 mg by mouth every evening   • zolpidem (AMBIEN) 10 mg tablet Take 1 tablet (10 mg total) by mouth daily at bedtime as needed for sleep       Objective:    LMP  (LMP Unknown)      Physical Exam  Davi Carty MD

## 2023-03-04 NOTE — TELEPHONE ENCOUNTER
Regarding: Covid +, symptoms, Paxlovid interest  ----- Message from Tabatha Fuentes sent at 3/4/2023  8:08 AM EST -----  "I have a cough, fever, cannot smell or taste, nasal congestion, chills, body aches, chest congestion; my Covid test is positive and I would like to know if I can have Paxlovid "

## 2023-03-04 NOTE — TELEPHONE ENCOUNTER
Reason for Disposition  • HIGH RISK for severe COVID complications (e g , weak immune system, age > 59 years, obesity with BMI > 22, pregnant, chronic lung disease or other chronic medical condition)  (Exception: Already seen by PCP and no new or worsening symptoms )    Answer Assessment - Initial Assessment Questions  1  COVID-19 DIAGNOSIS: "Who made your COVID-19 diagnosis?" "Was it confirmed by a positive lab test or self-test?" If not diagnosed by a doctor (or NP/PA), ask "Are there lots of cases (community spread) where you live?" Note: See public health department website, if unsure  Covid positive home test    2  COVID-19 EXPOSURE: "Was there any known exposure to COVID before the symptoms began?" CDC Definition of close contact: within 6 feet (2 meters) for a total of 15 minutes or more over a 24-hour period  Unsure    3  ONSET: "When did the COVID-19 symptoms start?"      3/3    4  WORST SYMPTOM: "What is your worst symptom?" (e g , cough, fever, shortness of breath, muscle aches)    Body aches    5  COUGH: "Do you have a cough?" If Yes, ask: "How bad is the cough?"     moderate cough    6  FEVER: "Do you have a fever?" If Yes, ask: "What is your temperature, how was it measured, and when did it start?"  Yes Temp-100    7  RESPIRATORY STATUS: "Describe your breathing?" (e g , shortness of breath, wheezing, unable to speak)     Denies respiratory distress    8  BETTER-SAME-WORSE: "Are you getting better, staying the same or getting worse compared to yesterday?"  If getting worse, ask, "In what way?"  Same    9  HIGH RISK DISEASE: "Do you have any chronic medical problems?" (e g , asthma, heart or lung disease, weak immune system, obesity, etc )   uses inhaler for exertion, afib    10  VACCINE: "Have you had the COVID-19 vaccine?" If Yes, ask: "Which one, how many shots, when did you get it?"    covid vaccines-moderna    11   BOOSTER: "Have you received your COVID-19 booster?" If Yes, ask: "Which one and when did you get it?"  Yes    12  PREGNANCY: "Is there any chance you are pregnant?" "When was your last menstrual period?"     N/a    13  OTHER SYMPTOMS: "Do you have any other symptoms?"  (e g , chills, fatigue, headache, loss of smell or taste, muscle pain, sore throat)      Cough, bodyaches, chills  Loss of taste and smell    14  O2 SATURATION MONITOR:  "Do you use an oxygen saturation monitor (pulse oximeter) at home?" If Yes, ask "What is your reading (oxygen level) today?" "What is your usual oxygen saturation reading?" (e g , 95%)  Denies    Protocols used: CORONAVIRUS (COVID-19) DIAGNOSED OR SUSPECTED-ADULT-AH      Taking tylenol as needed  Home care advice given  Paged on call provider  Provider stated he will do virtual visit with patient today to determine treatment

## 2023-03-16 ENCOUNTER — OFFICE VISIT (OUTPATIENT)
Dept: FAMILY MEDICINE CLINIC | Facility: CLINIC | Age: 63
End: 2023-03-16

## 2023-03-16 VITALS
DIASTOLIC BLOOD PRESSURE: 82 MMHG | RESPIRATION RATE: 18 BRPM | SYSTOLIC BLOOD PRESSURE: 126 MMHG | OXYGEN SATURATION: 98 % | HEIGHT: 63 IN | HEART RATE: 74 BPM | BODY MASS INDEX: 34.2 KG/M2 | WEIGHT: 193 LBS

## 2023-03-16 DIAGNOSIS — U07.1 COVID-19: ICD-10-CM

## 2023-03-16 DIAGNOSIS — J01.00 SUBACUTE MAXILLARY SINUSITIS: Primary | ICD-10-CM

## 2023-03-16 LAB — SARS-COV-2 AG UPPER RESP QL IA: ABNORMAL

## 2023-03-16 RX ORDER — BENZONATATE 200 MG/1
200 CAPSULE ORAL 3 TIMES DAILY PRN
Qty: 20 CAPSULE | Refills: 0 | Status: SHIPPED | OUTPATIENT
Start: 2023-03-16

## 2023-03-16 RX ORDER — AZITHROMYCIN 250 MG/1
TABLET, FILM COATED ORAL
Qty: 6 TABLET | Refills: 0 | Status: SHIPPED | OUTPATIENT
Start: 2023-03-16 | End: 2023-03-21

## 2023-03-16 RX ORDER — PREDNISONE 20 MG/1
TABLET ORAL
Qty: 15 TABLET | Refills: 0 | Status: SHIPPED | OUTPATIENT
Start: 2023-03-16

## 2023-03-16 NOTE — PROGRESS NOTES
[id number]         NAME: Angelina Perez is a 65 y o  female  : 1960    MRN: [id number]  DATE: 2023  TIME: 3:15 PM    Assessment and Plan   Subacute maxillary sinusitis [J01 00]  1  Subacute maxillary sinusitis  benzonatate (TESSALON) 200 MG capsule    azithromycin (Zithromax) 250 mg tablet      2  COVID-19  predniSONE 20 mg tablet          No problem-specific Assessment & Plan notes found for this encounter  Patient Instructions     Patient Instructions   Zithromax for 5 days for probable sinus infection  Tessalon Perles as needed for coughing  Chief Complaint     Chief Complaint   Patient presents with   • Cold Like Symptoms     Cough/congestion continues         History of Present Illness       Patient comes in today with persistent congestion and some cough  Diagnosed COVID via home test 2 weeks ago  Has persisted with sinus pressure with some yellow discharge  Cough is nonproductive  No current fevers  Review of Systems   Review of Systems   Constitutional: Negative for appetite change, chills, diaphoresis and fever  HENT: Positive for congestion and sinus pressure  Negative for ear pain, rhinorrhea and sore throat  Eyes: Negative for discharge, redness and itching  Respiratory: Positive for cough and shortness of breath  Negative for wheezing  Cardiovascular: Negative for chest pain and palpitations  Rapid or slow heart rate   Gastrointestinal: Negative for diarrhea, nausea and vomiting  Current Medications       Current Outpatient Medications:   •  albuterol (ProAir HFA) 90 mcg/act inhaler, Inhale 2 puffs every 6 (six) hours as needed for wheezing, Disp: 8 g, Rfl: 5  •  azithromycin (Zithromax) 250 mg tablet, Take 2 tablets (500 mg total) by mouth daily for 1 day, THEN 1 tablet (250 mg total) daily for 4 days  , Disp: 6 tablet, Rfl: 0  •  benzonatate (TESSALON) 200 MG capsule, Take 1 capsule (200 mg total) by mouth 3 (three) times a day as needed for cough, Disp: 20 capsule, Rfl: 0  •  enalapril (VASOTEC) 10 mg tablet, , Disp: , Rfl:   •  LORazepam (ATIVAN) 0 5 mg tablet, TAKE 1 TABLET BY MOUTH EVERY 8 HOURS AS NEEDED FOR ANXIETY , Disp: 60 tablet, Rfl: 5  •  metoprolol succinate (TOPROL-XL) 50 mg 24 hr tablet, Take 50 mg by mouth daily, Disp: , Rfl:   •  pravastatin (PRAVACHOL) 40 mg tablet, Take 40 mg by mouth daily, Disp: , Rfl:   •  predniSONE 20 mg tablet, 1 tab twice daily for 5 days, then 1 tab daily for 5 days  , Disp: 15 tablet, Rfl: 0  •  Synthroid 112 MCG tablet, Take 1 tablet (112 mcg total) by mouth daily in the early morning, Disp: 90 tablet, Rfl: 3  •  Xarelto 20 MG tablet, Take 20 mg by mouth every evening, Disp: , Rfl:     Current Allergies     Allergies as of 2023   • (No Known Allergies)            The following portions of the patient's history were reviewed and updated as appropriate: allergies, current medications, past family history, past medical history, past social history, past surgical history and problem list      Past Medical History:   Diagnosis Date   • Anxiety     On and off   • Cancer ([facility] [address] ) Basal cell   • Clotting disorder ([facility] [address] ) on Anticoagulant therapy   • Coronary artery disease 2018    Stent/Afib   • Disease of thyroid gland     hypo   • GERD (gastroesophageal reflux disease)    • Hypertension    • Prolonged QT interval        Past Surgical History:   Procedure Laterality Date   • BACK SURGERY     •  SECTION     • CHOLECYSTECTOMY     • HYSTERECTOMY      age 44   • KNEE ARTHROPLASTY     • PANNICULECTOMY      last assessed: 2013   • SALPINGOOPHORECTOMY Right     age 44 with hysterectomy   • SPINE SURGERY  2018    disc removed L4-5       Family History   Problem Relation Age of Onset   • Hypertension Mother    • Pancreatic cancer Mother 61   • Cancer Mother    • Hypertension Father    • No Known Problems Sister    • No Known Problems Daughter • No Known Problems Maternal Grandmother    • No Known Problems Maternal Grandfather    • No Known Problems Paternal Grandmother    • No Known Problems Paternal Grandfather    • No Known Problems Daughter    • No Known Problems Paternal Aunt    • No Known Problems Paternal Aunt    • No Known Problems Paternal Aunt    • No Known Problems Paternal Aunt    • No Known Problems Paternal Aunt    • Substance Abuse Neg Hx    • Mental illness Neg Hx          Medications have been verified  Objective   /82   Pulse 74   Resp 18   Ht 5' 3" (1 6 m)   Wt 87 5 kg (193 lb)   LMP  (LMP Unknown)   SpO2 98%   BMI 34 19 kg/m²        Physical Exam     Physical Exam  Vitals reviewed  Constitutional:       General: She is not in acute distress  Appearance: She is well-developed  She is not ill-appearing  HENT:      Head: Normocephalic and atraumatic  Right Ear: Tympanic membrane and external ear normal  No drainage  Left Ear: Tympanic membrane normal  No drainage  Nose: Congestion present  Mouth/Throat:      Pharynx: No oropharyngeal exudate or posterior oropharyngeal erythema  Eyes:      General:         Right eye: No discharge  Left eye: No discharge  Extraocular Movements: Extraocular movements intact  Conjunctiva/sclera: Conjunctivae normal       Pupils: Pupils are equal, round, and reactive to light  Neck:      Thyroid: No thyromegaly  Cardiovascular:      Rate and Rhythm: Normal rate and regular rhythm  Heart sounds: Normal heart sounds  Pulmonary:      Effort: Pulmonary effort is normal  No respiratory distress  Breath sounds: No wheezing or rales  Musculoskeletal:      Cervical back: Normal range of motion and neck supple  Right lower leg: No edema  Left lower leg: No edema  Lymphadenopathy:      Cervical: No cervical adenopathy  Neurological:      Mental Status: She is alert

## 2023-03-20 DIAGNOSIS — R05.3 PERSISTENT COUGH FOR 3 WEEKS OR LONGER: Primary | ICD-10-CM

## 2023-03-21 ENCOUNTER — HOSPITAL ENCOUNTER (OUTPATIENT)
Dept: RADIOLOGY | Facility: HOSPITAL | Age: 63
Discharge: HOME | End: 2023-03-21

## 2023-03-21 DIAGNOSIS — R05.3 PERSISTENT COUGH FOR 3 WEEKS OR LONGER: ICD-10-CM

## 2023-03-27 ENCOUNTER — TELEPHONE (OUTPATIENT)
Dept: FAMILY MEDICINE CLINIC | Facility: CLINIC | Age: 63
End: 2023-03-27

## 2023-03-27 NOTE — TELEPHONE ENCOUNTER
Pt aware, pt states she has not seen a lot of improvement, chest is not tight but still has cough and congestion      ----- Message from Gino Bonilla MD sent at 3/27/2023  7:47 AM EDT -----  Call patient with lab result-normal  Get progress report

## 2023-03-29 NOTE — TELEPHONE ENCOUNTER
Pt states it is not in her chest- it is all in her sinus/head congestion  Her head/cheek/nose/sinus are still congested/painful  She has yellow discharge/phlegm  She is using flonase/mucinex with no relief  She has to fly in 3 days and is asking what to do next?

## 2023-07-31 ENCOUNTER — OFFICE VISIT (OUTPATIENT)
Dept: FAMILY MEDICINE CLINIC | Facility: CLINIC | Age: 63
End: 2023-07-31
Payer: COMMERCIAL

## 2023-07-31 VITALS
WEIGHT: 185 LBS | HEART RATE: 68 BPM | DIASTOLIC BLOOD PRESSURE: 86 MMHG | TEMPERATURE: 98.2 F | BODY MASS INDEX: 32.77 KG/M2 | SYSTOLIC BLOOD PRESSURE: 128 MMHG | OXYGEN SATURATION: 99 %

## 2023-07-31 DIAGNOSIS — J01.00 SUBACUTE MAXILLARY SINUSITIS: ICD-10-CM

## 2023-07-31 DIAGNOSIS — E78.2 MIXED HYPERLIPIDEMIA: ICD-10-CM

## 2023-07-31 DIAGNOSIS — I48.0 PAROXYSMAL ATRIAL FIBRILLATION (HCC): ICD-10-CM

## 2023-07-31 DIAGNOSIS — F41.9 ANXIETY: ICD-10-CM

## 2023-07-31 DIAGNOSIS — E55.9 VITAMIN D DEFICIENCY: ICD-10-CM

## 2023-07-31 DIAGNOSIS — E06.3 HYPOTHYROIDISM DUE TO HASHIMOTO'S THYROIDITIS: ICD-10-CM

## 2023-07-31 DIAGNOSIS — E03.8 HYPOTHYROIDISM DUE TO HASHIMOTO'S THYROIDITIS: ICD-10-CM

## 2023-07-31 DIAGNOSIS — E03.8 OTHER SPECIFIED HYPOTHYROIDISM: Primary | ICD-10-CM

## 2023-07-31 DIAGNOSIS — M81.8 OTHER OSTEOPOROSIS, UNSPECIFIED PATHOLOGICAL FRACTURE PRESENCE: ICD-10-CM

## 2023-07-31 DIAGNOSIS — I10 PRIMARY HYPERTENSION: ICD-10-CM

## 2023-07-31 PROCEDURE — 99214 OFFICE O/P EST MOD 30 MIN: CPT | Performed by: FAMILY MEDICINE

## 2023-07-31 PROCEDURE — 3725F SCREEN DEPRESSION PERFORMED: CPT | Performed by: FAMILY MEDICINE

## 2023-07-31 RX ORDER — LORAZEPAM 0.5 MG/1
0.5 TABLET ORAL EVERY 8 HOURS PRN
Qty: 60 TABLET | Refills: 5 | Status: SHIPPED | OUTPATIENT
Start: 2023-07-31

## 2023-07-31 RX ORDER — LEVOTHYROXINE SODIUM 112 MCG
112 TABLET ORAL
Qty: 90 TABLET | Refills: 3 | Status: SHIPPED | OUTPATIENT
Start: 2023-07-31

## 2023-07-31 RX ORDER — AMOXICILLIN 500 MG/1
500 CAPSULE ORAL EVERY 8 HOURS SCHEDULED
Qty: 30 CAPSULE | Refills: 0 | Status: SHIPPED | OUTPATIENT
Start: 2023-07-31 | End: 2023-08-10

## 2023-07-31 NOTE — PROGRESS NOTES
Assessment/Plan:    No problem-specific Assessment & Plan notes found for this encounter. Diagnoses and all orders for this visit:    Other specified hypothyroidism  -     T4, free; Future  -     TSH, 3rd generation; Future  -     CBC and differential; Future  -     Comprehensive metabolic panel; Future  -     Lipid Panel with Direct LDL reflex; Future  -     Hemoglobin A1c (w/out EAG); Future  -     Vitamin D 25 hydroxy; Future  -     T4, free  -     TSH, 3rd generation  -     CBC and differential  -     Comprehensive metabolic panel  -     Lipid Panel with Direct LDL reflex  -     Hemoglobin A1c (w/out EAG)  -     Vitamin D 25 hydroxy    Primary hypertension  -     T4, free; Future  -     TSH, 3rd generation; Future  -     CBC and differential; Future  -     Comprehensive metabolic panel; Future  -     Lipid Panel with Direct LDL reflex; Future  -     Hemoglobin A1c (w/out EAG); Future  -     Vitamin D 25 hydroxy; Future  -     T4, free  -     TSH, 3rd generation  -     CBC and differential  -     Comprehensive metabolic panel  -     Lipid Panel with Direct LDL reflex  -     Hemoglobin A1c (w/out EAG)  -     Vitamin D 25 hydroxy    Vitamin D deficiency  -     T4, free; Future  -     TSH, 3rd generation; Future  -     CBC and differential; Future  -     Comprehensive metabolic panel; Future  -     Lipid Panel with Direct LDL reflex; Future  -     Hemoglobin A1c (w/out EAG); Future  -     Vitamin D 25 hydroxy; Future  -     T4, free  -     TSH, 3rd generation  -     CBC and differential  -     Comprehensive metabolic panel  -     Lipid Panel with Direct LDL reflex  -     Hemoglobin A1c (w/out EAG)  -     Vitamin D 25 hydroxy    Mixed hyperlipidemia  -     T4, free; Future  -     TSH, 3rd generation; Future  -     CBC and differential; Future  -     Comprehensive metabolic panel; Future  -     Lipid Panel with Direct LDL reflex; Future  -     Hemoglobin A1c (w/out EAG);  Future  -     Vitamin D 25 hydroxy; Future  -     T4, free  -     TSH, 3rd generation  -     CBC and differential  -     Comprehensive metabolic panel  -     Lipid Panel with Direct LDL reflex  -     Hemoglobin A1c (w/out EAG)  -     Vitamin D 25 hydroxy    Paroxysmal atrial fibrillation (HCC)    Anxiety  -     LORazepam (ATIVAN) 0.5 mg tablet; Take 1 tablet (0.5 mg total) by mouth every 8 (eight) hours as needed for anxiety    Subacute maxillary sinusitis  -     amoxicillin (AMOXIL) 500 mg capsule; Take 1 capsule (500 mg total) by mouth every 8 (eight) hours for 10 days    Other osteoporosis, unspecified pathological fracture presence  -     DXA bone density spine hip and pelvis; Future          Subjective:   Chief Complaint   Patient presents with   • Follow-up     Med check        Patient ID: Hattie Ferris is a 59 y.o. female. HPI    The following portions of the patient's history were reviewed and updated as appropriate: allergies, current medications, past family history, past medical history, past social history, past surgical history and problem list.    Review of Systems   Constitutional: Negative for fatigue, fever and unexpected weight change. HENT: Negative for congestion, sinus pain and sore throat. Eyes: Negative for visual disturbance. Respiratory: Negative for shortness of breath and wheezing. Cardiovascular: Negative for chest pain and palpitations. Gastrointestinal: Negative for abdominal pain, nausea and vomiting. Musculoskeletal: Negative. Negative for arthralgias and myalgias. Neurological: Negative for syncope, weakness and numbness. Psychiatric/Behavioral: Negative. Negative for confusion, dysphoric mood and suicidal ideas.          Objective:  Vitals:    07/31/23 0809   BP: 128/86   BP Location: Left arm   Patient Position: Sitting   Cuff Size: Standard   Pulse: 68   Temp: 98.2 °F (36.8 °C)   TempSrc: Tympanic   SpO2: 99%   Weight: 83.9 kg (185 lb)      Physical Exam  Constitutional:       Appearance: She is well-developed. HENT:      Right Ear: Ear canal normal. Tympanic membrane is not injected. Left Ear: Ear canal normal. Tympanic membrane is not injected. Nose: Nose normal.   Eyes:      General:         Right eye: No discharge. Left eye: No discharge. Conjunctiva/sclera: Conjunctivae normal.      Pupils: Pupils are equal, round, and reactive to light. Neck:      Thyroid: No thyromegaly. Cardiovascular:      Rate and Rhythm: Normal rate and regular rhythm. Heart sounds: Normal heart sounds. No murmur heard. Pulmonary:      Effort: Pulmonary effort is normal. No respiratory distress. Breath sounds: Normal breath sounds. No wheezing. Abdominal:      General: Bowel sounds are normal. There is no distension. Palpations: Abdomen is soft. Tenderness: There is no abdominal tenderness. Musculoskeletal:         General: Normal range of motion. Cervical back: Normal range of motion and neck supple. Lymphadenopathy:      Cervical: No cervical adenopathy. Skin:     General: Skin is warm and dry. Neurological:      Mental Status: She is alert and oriented to person, place, and time. She is not disoriented. Sensory: No sensory deficit. Gait: Gait normal.      Deep Tendon Reflexes: Reflexes are normal and symmetric. Psychiatric:         Speech: Speech normal.         Behavior: Behavior normal.         Thought Content:  Thought content normal.         Judgment: Judgment normal.

## 2023-08-15 ENCOUNTER — HOSPITAL ENCOUNTER (OUTPATIENT)
Dept: BONE DENSITY | Facility: IMAGING CENTER | Age: 63
Discharge: HOME | End: 2023-08-15
Payer: COMMERCIAL

## 2023-08-15 VITALS — HEIGHT: 63 IN | BODY MASS INDEX: 32.74 KG/M2 | WEIGHT: 184.8 LBS

## 2023-08-15 DIAGNOSIS — M81.8 OTHER OSTEOPOROSIS, UNSPECIFIED PATHOLOGICAL FRACTURE PRESENCE: ICD-10-CM

## 2023-08-15 PROCEDURE — 77080 DXA BONE DENSITY AXIAL: CPT

## 2023-08-16 ENCOUNTER — TELEPHONE (OUTPATIENT)
Dept: FAMILY MEDICINE CLINIC | Facility: CLINIC | Age: 63
End: 2023-08-16

## 2023-08-16 NOTE — TELEPHONE ENCOUNTER
----- Message from Yuval Wilburn MD sent at 8/16/2023 12:59 PM EDT -----  Good bones!  Cont exercise  ----- Message -----  From: Interface, Radiology Results In  Sent: 8/16/2023  10:36 AM EDT  To: Yuval Wilburn MD

## 2023-10-21 ENCOUNTER — PATIENT MESSAGE (OUTPATIENT)
Dept: FAMILY MEDICINE CLINIC | Facility: CLINIC | Age: 63
End: 2023-10-21

## 2023-10-21 DIAGNOSIS — L30.9 DERMATITIS: Primary | ICD-10-CM

## 2023-10-23 RX ORDER — TRIAMCINOLONE ACETONIDE 5 MG/G
OINTMENT TOPICAL 2 TIMES DAILY
Qty: 45 G | Refills: 5 | Status: SHIPPED | OUTPATIENT
Start: 2023-10-23

## 2023-11-03 ENCOUNTER — HOSPITAL ENCOUNTER (OUTPATIENT)
Dept: MAMMOGRAPHY | Facility: IMAGING CENTER | Age: 63
Discharge: HOME | End: 2023-11-03
Payer: COMMERCIAL

## 2023-11-03 VITALS — WEIGHT: 186 LBS | BODY MASS INDEX: 32.96 KG/M2 | HEIGHT: 63 IN

## 2023-11-03 DIAGNOSIS — Z12.31 ENCOUNTER FOR SCREENING MAMMOGRAM FOR MALIGNANT NEOPLASM OF BREAST: ICD-10-CM

## 2023-11-03 PROCEDURE — 77067 SCR MAMMO BI INCL CAD: CPT

## 2023-11-03 PROCEDURE — 77063 BREAST TOMOSYNTHESIS BI: CPT

## 2023-11-08 ENCOUNTER — TELEPHONE (OUTPATIENT)
Dept: FAMILY MEDICINE CLINIC | Facility: CLINIC | Age: 63
End: 2023-11-08

## 2023-11-08 ENCOUNTER — PATIENT MESSAGE (OUTPATIENT)
Dept: FAMILY MEDICINE CLINIC | Facility: CLINIC | Age: 63
End: 2023-11-08

## 2023-11-08 DIAGNOSIS — E03.8 HYPOTHYROIDISM DUE TO HASHIMOTO'S THYROIDITIS: ICD-10-CM

## 2023-11-08 DIAGNOSIS — E06.3 HYPOTHYROIDISM DUE TO HASHIMOTO'S THYROIDITIS: ICD-10-CM

## 2023-11-08 LAB
25(OH)D3+25(OH)D2 SERPL-MCNC: 37 NG/ML (ref 30–100)
ALBUMIN SERPL-MCNC: 4 G/DL (ref 3.9–4.9)
ALBUMIN/GLOB SERPL: 1.4 {RATIO} (ref 1.2–2.2)
ALP SERPL-CCNC: 53 IU/L (ref 44–121)
ALT SERPL-CCNC: 22 IU/L (ref 0–32)
AST SERPL-CCNC: 29 IU/L (ref 0–40)
BASOPHILS # BLD AUTO: 0.1 X10E3/UL (ref 0–0.2)
BASOPHILS NFR BLD AUTO: 1 %
BILIRUB SERPL-MCNC: 0.5 MG/DL (ref 0–1.2)
BUN SERPL-MCNC: 16 MG/DL (ref 8–27)
BUN/CREAT SERPL: 17 (ref 12–28)
CALCIUM SERPL-MCNC: 9.7 MG/DL (ref 8.7–10.3)
CHLORIDE SERPL-SCNC: 105 MMOL/L (ref 96–106)
CHOLEST SERPL-MCNC: 197 MG/DL (ref 100–199)
CO2 SERPL-SCNC: 18 MMOL/L (ref 20–29)
CREAT SERPL-MCNC: 0.95 MG/DL (ref 0.57–1)
EGFR: 68 ML/MIN/1.73
EOSINOPHIL # BLD AUTO: 0.1 X10E3/UL (ref 0–0.4)
EOSINOPHIL NFR BLD AUTO: 3 %
ERYTHROCYTE [DISTWIDTH] IN BLOOD BY AUTOMATED COUNT: 12.8 % (ref 11.7–15.4)
GLOBULIN SER-MCNC: 2.9 G/DL (ref 1.5–4.5)
GLUCOSE SERPL-MCNC: 100 MG/DL (ref 70–99)
HBA1C MFR BLD: 5.8 % (ref 4.8–5.6)
HCT VFR BLD AUTO: 38.3 % (ref 34–46.6)
HDLC SERPL-MCNC: 65 MG/DL
HGB BLD-MCNC: 12.6 G/DL (ref 11.1–15.9)
IMM GRANULOCYTES # BLD: 0 X10E3/UL (ref 0–0.1)
IMM GRANULOCYTES NFR BLD: 0 %
LDLC SERPL CALC-MCNC: 115 MG/DL (ref 0–99)
LDLC/HDLC SERPL: 1.8 RATIO (ref 0–3.2)
LYMPHOCYTES # BLD AUTO: 1.2 X10E3/UL (ref 0.7–3.1)
LYMPHOCYTES NFR BLD AUTO: 23 %
MCH RBC QN AUTO: 28.3 PG (ref 26.6–33)
MCHC RBC AUTO-ENTMCNC: 32.9 G/DL (ref 31.5–35.7)
MCV RBC AUTO: 86 FL (ref 79–97)
MONOCYTES # BLD AUTO: 0.4 X10E3/UL (ref 0.1–0.9)
MONOCYTES NFR BLD AUTO: 8 %
NEUTROPHILS # BLD AUTO: 3.5 X10E3/UL (ref 1.4–7)
NEUTROPHILS NFR BLD AUTO: 65 %
PLATELET # BLD AUTO: 215 X10E3/UL (ref 150–450)
POTASSIUM SERPL-SCNC: 4.2 MMOL/L (ref 3.5–5.2)
PROT SERPL-MCNC: 6.9 G/DL (ref 6–8.5)
RBC # BLD AUTO: 4.46 X10E6/UL (ref 3.77–5.28)
SL AMB VLDL CHOLESTEROL CALC: 17 MG/DL (ref 5–40)
SODIUM SERPL-SCNC: 143 MMOL/L (ref 134–144)
T4 FREE SERPL-MCNC: 1.56 NG/DL (ref 0.82–1.77)
TRIGL SERPL-MCNC: 94 MG/DL (ref 0–149)
TSH SERPL DL<=0.005 MIU/L-ACNC: 3.09 UIU/ML (ref 0.45–4.5)
WBC # BLD AUTO: 5.3 X10E3/UL (ref 3.4–10.8)

## 2023-11-08 NOTE — TELEPHONE ENCOUNTER
----- Message from Layla Thomas MD sent at 11/8/2023  7:24 AM EST -----  Labs NL  ----- Message -----  From: Soni [facility] Lab Results In  Sent: 11/8/2023   6:06 AM EST  To: Layla Thomas MD

## 2023-11-09 RX ORDER — LEVOTHYROXINE SODIUM 112 MCG
112 TABLET ORAL
Qty: 90 TABLET | Refills: 3 | Status: SHIPPED | OUTPATIENT
Start: 2023-11-09

## 2023-11-24 ENCOUNTER — PATIENT MESSAGE (OUTPATIENT)
Dept: FAMILY MEDICINE CLINIC | Facility: CLINIC | Age: 63
End: 2023-11-24

## 2023-11-24 DIAGNOSIS — E03.8 OTHER SPECIFIED HYPOTHYROIDISM: Primary | ICD-10-CM

## 2024-02-02 DIAGNOSIS — Z00.6 ENCOUNTER FOR EXAMINATION FOR NORMAL COMPARISON OR CONTROL IN CLINICAL RESEARCH PROGRAM: ICD-10-CM

## 2024-02-06 ENCOUNTER — APPOINTMENT (OUTPATIENT)
Dept: LAB | Facility: HOSPITAL | Age: 64
End: 2024-02-06

## 2024-02-06 DIAGNOSIS — Z00.6 ENCOUNTER FOR EXAMINATION FOR NORMAL COMPARISON OR CONTROL IN CLINICAL RESEARCH PROGRAM: ICD-10-CM

## 2024-02-06 PROCEDURE — 36415 COLL VENOUS BLD VENIPUNCTURE: CPT

## 2024-02-16 ENCOUNTER — TELEPHONE (OUTPATIENT)
Dept: FAMILY MEDICINE CLINIC | Facility: CLINIC | Age: 64
End: 2024-02-16

## 2024-02-16 DIAGNOSIS — E03.8 HYPOTHYROIDISM DUE TO HASHIMOTO'S THYROIDITIS: Primary | ICD-10-CM

## 2024-02-16 DIAGNOSIS — E06.3 HYPOTHYROIDISM DUE TO HASHIMOTO'S THYROIDITIS: Primary | ICD-10-CM

## 2024-02-16 LAB
T4 FREE SERPL-MCNC: 1.97 NG/DL (ref 0.82–1.77)
TSH SERPL DL<=0.005 MIU/L-ACNC: 0.04 UIU/ML (ref 0.45–4.5)

## 2024-02-16 RX ORDER — LEVOTHYROXINE SODIUM 0.1 MG/1
100 TABLET ORAL
Qty: 90 TABLET | Refills: 3 | Status: SHIPPED | OUTPATIENT
Start: 2024-02-16

## 2024-02-16 NOTE — TELEPHONE ENCOUNTER
----- Message from Mickey Majano MD sent at 2/16/2024  6:47 AM EST -----  Thyroid too high---needs to decr 1 notch---if taking 112 needs to decr to 100--RP 3mos  ----- Message -----  From: Soni, [facility] Lab Results In  Sent: 2/16/2024   6:06 AM EST  To: Mickey Majano MD

## 2024-03-03 LAB
APOB+LDLR+PCSK9 GENE MUT ANL BLD/T: NOT DETECTED
BRCA1+BRCA2 DEL+DUP + FULL MUT ANL BLD/T: NOT DETECTED
MLH1+MSH2+MSH6+PMS2 GN DEL+DUP+FUL M: NOT DETECTED

## 2024-05-06 ENCOUNTER — OFFICE VISIT (OUTPATIENT)
Dept: FAMILY MEDICINE CLINIC | Facility: CLINIC | Age: 64
End: 2024-05-06
Payer: COMMERCIAL

## 2024-05-06 VITALS
TEMPERATURE: 96.7 F | SYSTOLIC BLOOD PRESSURE: 130 MMHG | WEIGHT: 168 LBS | OXYGEN SATURATION: 98 % | BODY MASS INDEX: 29.76 KG/M2 | DIASTOLIC BLOOD PRESSURE: 84 MMHG | HEART RATE: 77 BPM

## 2024-05-06 DIAGNOSIS — F41.9 ANXIETY: ICD-10-CM

## 2024-05-06 DIAGNOSIS — J01.00 ACUTE NON-RECURRENT MAXILLARY SINUSITIS: Primary | ICD-10-CM

## 2024-05-06 DIAGNOSIS — I48.0 PAROXYSMAL ATRIAL FIBRILLATION (HCC): ICD-10-CM

## 2024-05-06 PROCEDURE — 99214 OFFICE O/P EST MOD 30 MIN: CPT | Performed by: NURSE PRACTITIONER

## 2024-05-06 RX ORDER — OMEPRAZOLE 20 MG/1
20 CAPSULE, DELAYED RELEASE ORAL
COMMUNITY
Start: 2024-03-05

## 2024-05-06 RX ORDER — OLMESARTAN MEDOXOMIL 40 MG/1
40 TABLET ORAL DAILY
COMMUNITY

## 2024-05-06 RX ORDER — LORAZEPAM 0.5 MG/1
0.5 TABLET ORAL EVERY 8 HOURS PRN
Qty: 60 TABLET | Refills: 0 | Status: SHIPPED | OUTPATIENT
Start: 2024-05-06

## 2024-05-06 RX ORDER — AMOXICILLIN AND CLAVULANATE POTASSIUM 875; 125 MG/1; MG/1
1 TABLET, FILM COATED ORAL EVERY 12 HOURS SCHEDULED
Qty: 20 TABLET | Refills: 0 | Status: SHIPPED | OUTPATIENT
Start: 2024-05-06 | End: 2024-05-16

## 2024-05-06 RX ORDER — PANCRELIPASE 36000; 180000; 114000 [USP'U]/1; [USP'U]/1; [USP'U]/1
CAPSULE, DELAYED RELEASE PELLETS ORAL
COMMUNITY
Start: 2024-03-25

## 2024-05-06 RX ORDER — EZETIMIBE 10 MG/1
10 TABLET ORAL DAILY
COMMUNITY

## 2024-05-06 NOTE — PROGRESS NOTES
Name: Cece Massey      : 1960      MRN: [id number]  Encounter Provider: JACEY Yoon  Encounter Date: 2024   Encounter department: [facility]    Assessment & Plan     1. Acute non-recurrent maxillary sinusitis  -     amoxicillin-clavulanate (AUGMENTIN) 875-125 mg per tablet; Take 1 tablet by mouth every 12 (twelve) hours for 10 days                 - Discussed viral vs bacterial infection           RX as ordered.          - Continue OTC cough/cold                  medications as directed.          - Call or return for problems/concerns.    2. Paroxysmal atrial fibrillation (HCC)       -Condition stable.Taking Eliquis-follows         With cardiology.    3. Anxiety  -     LORazepam (ATIVAN) 0.5 mg tablet; Take 1 tablet (0.5 mg total) by mouth every 8 (eight) hours as needed for anxiety           Subjective      C/o sinus pain and pressure, nasal congestion started last week, earache started 2 days ago-left side worse. No fever. Took sudafed and saline nasal spay with mild relief.  Hx of anxiety-She needs a refill for ativan.  Hx of Afib-taking Eliquis-follows with cardiology.    Earache   There is pain in both ears. This is a chronic problem. The current episode started in the past 7 days. The problem occurs constantly. The problem has been gradually worsening. There has been no fever. The pain is at a severity of 7/10. Associated symptoms include coughing, ear discharge, hearing loss, rhinorrhea and a sore throat. Pertinent negatives include no abdominal pain, diarrhea, headaches, neck pain, rash or vomiting.     Review of Systems   Constitutional:  Negative for activity change and fever.   HENT:  Positive for congestion, ear discharge, ear pain, hearing loss, rhinorrhea, sinus pressure, sinus pain and sore throat.    Respiratory:  Positive for cough. Negative for chest tightness and shortness of breath.    Cardiovascular:  Negative for chest pain.    Gastrointestinal:  Negative for abdominal pain, diarrhea and vomiting.   Genitourinary:  Negative for difficulty urinating.   Musculoskeletal:  Negative for myalgias and neck pain.   Skin:  Negative for rash.   Neurological:  Negative for dizziness and headaches.   Psychiatric/Behavioral:  Negative for dysphoric mood.        Current Outpatient Medications on File Prior to Visit   Medication Sig    albuterol (ProAir HFA) 90 mcg/act inhaler Inhale 2 puffs every 6 (six) hours as needed for wheezing    Creon [id number] units CPEP TAKE 3 TABLETS BY MOUTH 30 MINUTES PRIOR TO MEALS AND 1 TABLET WITH SNACKS    ezetimibe (ZETIA) 10 mg tablet Take 10 mg by mouth daily    levothyroxine (Synthroid) 100 mcg tablet Take 1 tablet (100 mcg total) by mouth daily in the early morning    metoprolol succinate (TOPROL-XL) 50 mg 24 hr tablet Take 50 mg by mouth daily    olmesartan (BENICAR) 40 mg tablet Take 40 mg by mouth daily    omeprazole (PriLOSEC) 20 mg delayed release capsule Take 20 mg by mouth daily before breakfast Take 30 minutes before breakfast    pravastatin (PRAVACHOL) 40 mg tablet Take 40 mg by mouth daily    triamcinolone (KENALOG) 0.5 % ointment Apply topically 2 (two) times a day    Xarelto 20 MG tablet Take 20 mg by mouth every evening    [DISCONTINUED] LORazepam (ATIVAN) 0.5 mg tablet Take 1 tablet (0.5 mg total) by mouth every 8 (eight) hours as needed for anxiety    [DISCONTINUED] benzonatate (TESSALON) 200 MG capsule Take 1 capsule (200 mg total) by mouth 3 (three) times a day as needed for cough    [DISCONTINUED] enalapril (VASOTEC) 10 mg tablet     [DISCONTINUED] predniSONE 20 mg tablet 1 tab twice daily for 5 days, then 1 tab daily for 5 days.    [DISCONTINUED] Synthroid 112 MCG tablet Take 1 tablet (112 mcg total) by mouth daily in the early morning       Objective     /84 (BP Location: Left arm, Patient Position: Sitting, Cuff Size: Standard)   Pulse 77   Temp (!) 96.7 °F (35.9 °C) (Tympanic)    Wt 76.2 kg (168 lb)   LMP  (LMP Unknown)   SpO2 98%   BMI 29.76 kg/m²     Physical Exam  Vitals and nursing note reviewed.   Constitutional:       General: She is not in acute distress.     Appearance: She is well-developed. She is ill-appearing. She is not toxic-appearing or diaphoretic.   HENT:      Head: Normocephalic and atraumatic.      Right Ear: Tympanic membrane, ear canal and external ear normal. There is no impacted cerumen.      Left Ear: Ear canal and external ear normal. Swelling and tenderness present. No drainage. Tympanic membrane is erythematous.      Nose: Rhinorrhea present.      Right Sinus: Maxillary sinus tenderness present.      Left Sinus: Maxillary sinus tenderness present.      Mouth/Throat:      Mouth: Mucous membranes are moist.      Pharynx: Oropharynx is clear. Uvula midline. Posterior oropharyngeal erythema present. No oropharyngeal exudate.   Eyes:      General:         Right eye: Discharge present.         Left eye: No discharge.      Extraocular Movements: Extraocular movements intact.      Conjunctiva/sclera: Conjunctivae normal.   Cardiovascular:      Rate and Rhythm: Normal rate and regular rhythm.      Heart sounds: Normal heart sounds. No murmur heard.     No friction rub. No gallop.   Pulmonary:      Effort: Pulmonary effort is normal. No respiratory distress.      Breath sounds: Normal breath sounds. No wheezing, rhonchi or rales.   Musculoskeletal:         General: Normal range of motion.      Cervical back: Normal range of motion and neck supple.   Skin:     General: Skin is warm and dry.      Coloration: Skin is not pale.      Findings: No erythema.   Neurological:      Mental Status: She is alert and oriented to person, place, and time.   Psychiatric:         Mood and Affect: Mood normal.         Behavior: Behavior normal.         Thought Content: Thought content normal.         Judgment: Judgment normal.       JACEY Yoon

## 2024-05-06 NOTE — PATIENT INSTRUCTIONS
Discussed viral vs bacterial infection  RX as ordered  Continue OTC cough/cold medications as directed  Call or return for problems/concerns

## 2024-05-17 LAB
T4 FREE SERPL-MCNC: 1.66 NG/DL (ref 0.82–1.77)
TSH SERPL DL<=0.005 MIU/L-ACNC: 0.77 UIU/ML (ref 0.45–4.5)

## 2024-05-20 ENCOUNTER — TELEPHONE (OUTPATIENT)
Dept: FAMILY MEDICINE CLINIC | Facility: CLINIC | Age: 64
End: 2024-05-20

## 2024-05-21 ENCOUNTER — OFFICE VISIT (OUTPATIENT)
Dept: FAMILY MEDICINE CLINIC | Facility: CLINIC | Age: 64
End: 2024-05-21
Payer: COMMERCIAL

## 2024-05-21 VITALS
HEIGHT: 63 IN | BODY MASS INDEX: 29.77 KG/M2 | OXYGEN SATURATION: 98 % | WEIGHT: 168 LBS | DIASTOLIC BLOOD PRESSURE: 82 MMHG | RESPIRATION RATE: 18 BRPM | TEMPERATURE: 98 F | HEART RATE: 58 BPM | SYSTOLIC BLOOD PRESSURE: 124 MMHG

## 2024-05-21 DIAGNOSIS — E74.39 GLUCOSE INTOLERANCE: ICD-10-CM

## 2024-05-21 DIAGNOSIS — Z00.00 WELLNESS EXAMINATION: Primary | ICD-10-CM

## 2024-05-21 DIAGNOSIS — E03.8 OTHER SPECIFIED HYPOTHYROIDISM: ICD-10-CM

## 2024-05-21 DIAGNOSIS — E78.2 MIXED HYPERLIPIDEMIA: ICD-10-CM

## 2024-05-21 PROCEDURE — 99396 PREV VISIT EST AGE 40-64: CPT | Performed by: FAMILY MEDICINE

## 2024-05-21 RX ORDER — FAMOTIDINE 20 MG/1
1 TABLET, FILM COATED ORAL
COMMUNITY
Start: 2024-05-14

## 2024-05-21 NOTE — PROGRESS NOTES
HPI:  Cece Massey is a 63 y.o. female here for her yearly health maintenance exam.   Patient Active Problem List   Diagnosis    Hypertension    Hypothyroidism    Vitamin D deficiency    Mixed hyperlipidemia    Paroxysmal atrial fibrillation (HCC)     Past Medical History:   Diagnosis Date    Anxiety 2020    On and off    Cancer (HCC) Basal cell    Clotting disorder (HCC) on Anticoagulant therapy    Coronary artery disease 08/11/2018    Stent/Afib    Disease of thyroid gland     hypo    GERD (gastroesophageal reflux disease)     Hypertension     Prolonged QT interval        1. Advanced Directive: n     2. Durable Power of  for Healthcare: n     3. Social History:           Drug and alcohol History: n                  4. Immunizations up to date: y                 Lifestyle:                           Healthy Diet:y                          Alcohol Use:n                          Tobacco Use:n                          Regular exercise:y                          Weight concerns:n                               5. Over the past 2 weeks, how often have you been bothered by the following:              Little interest or pleasure in doing things:n              Felling down, depressed or hopeless:n       Current Outpatient Medications   Medication Sig Dispense Refill    albuterol (ProAir HFA) 90 mcg/act inhaler Inhale 2 puffs every 6 (six) hours as needed for wheezing 8 g 5    Creon [id number] units CPEP TAKE 3 TABLETS BY MOUTH 30 MINUTES PRIOR TO MEALS AND 1 TABLET WITH SNACKS      ezetimibe (ZETIA) 10 mg tablet Take 10 mg by mouth daily      famotidine (PEPCID) 20 mg tablet Take 1 tablet by mouth daily at bedtime      levothyroxine (Synthroid) 100 mcg tablet Take 1 tablet (100 mcg total) by mouth daily in the early morning 90 tablet 3    LORazepam (ATIVAN) 0.5 mg tablet Take 1 tablet (0.5 mg total) by mouth every 8 (eight) hours as needed for anxiety 60 tablet 0    metoprolol succinate (TOPROL-XL) 50 mg 24 hr  tablet Take 50 mg by mouth daily      olmesartan (BENICAR) 40 mg tablet Take 40 mg by mouth daily      omeprazole (PriLOSEC) 20 mg delayed release capsule Take 20 mg by mouth daily before breakfast Take 30 minutes before breakfast      pravastatin (PRAVACHOL) 40 mg tablet Take 40 mg by mouth daily      triamcinolone (KENALOG) 0.5 % ointment Apply topically 2 (two) times a day 45 g 5    Xarelto 20 MG tablet Take 20 mg by mouth every evening       No current facility-administered medications for this visit.     No Known Allergies  Immunization History   Administered Date(s) Administered    COVID-19 MODERNA VACC 0.5 ML IM 01/06/2021, 02/05/2021, 11/05/2021    COVID-19 Moderna Vac BIVALENT 12 Yr+ IM 0.5 ML 09/23/2022    H1N1, All Formulations 10/23/2009    INFLUENZA 09/29/2018, 09/28/2019, 10/24/2020, 11/04/2020, 10/15/2021    Influenza Quadrivalent Preservative Free 3 years and older IM 10/22/2017    Influenza, seasonal, injectable 10/15/2016, 10/01/2019    Tdap 03/22/2011, 07/27/2020       Patient Care Team:  Mickey Majano MD as PCP - General    Review of Systems   Constitutional:  Negative for fatigue, fever and unexpected weight change.   HENT:  Negative for congestion, sinus pain and sore throat.    Eyes:  Negative for visual disturbance.   Respiratory:  Negative for shortness of breath and wheezing.    Cardiovascular:  Negative for chest pain and palpitations.   Gastrointestinal:  Negative for abdominal pain, nausea and vomiting.   Musculoskeletal: Negative.  Negative for arthralgias and myalgias.   Neurological:  Negative for syncope, weakness and numbness.   Psychiatric/Behavioral: Negative.  Negative for confusion, dysphoric mood and suicidal ideas.        Physical Exam :  Physical Exam  Constitutional:       Appearance: She is well-developed.   HENT:      Right Ear: Ear canal normal. Tympanic membrane is not injected.      Left Ear: Ear canal normal. Tympanic membrane is not injected.      Nose: Nose normal.    Eyes:      General:         Right eye: No discharge.         Left eye: No discharge.      Conjunctiva/sclera: Conjunctivae normal.      Pupils: Pupils are equal, round, and reactive to light.   Neck:      Thyroid: No thyromegaly.   Cardiovascular:      Rate and Rhythm: Normal rate and regular rhythm.      Heart sounds: Normal heart sounds. No murmur heard.  Pulmonary:      Effort: Pulmonary effort is normal. No respiratory distress.      Breath sounds: Normal breath sounds. No wheezing.   Abdominal:      General: Bowel sounds are normal. There is no distension.      Palpations: Abdomen is soft.      Tenderness: There is no abdominal tenderness.   Musculoskeletal:         General: Normal range of motion.      Cervical back: Normal range of motion and neck supple.   Lymphadenopathy:      Cervical: No cervical adenopathy.   Skin:     General: Skin is warm and dry.   Neurological:      Mental Status: She is alert and oriented to person, place, and time. She is not disoriented.      Sensory: No sensory deficit.      Motor: No weakness.      Coordination: Coordination normal.      Gait: Gait normal.      Deep Tendon Reflexes: Reflexes are normal and symmetric.   Psychiatric:         Speech: Speech normal.         Behavior: Behavior normal.         Thought Content: Thought content normal.         Judgment: Judgment normal.           Assessment and Plan:  1. Wellness examination        2. Mixed hyperlipidemia  CBC and differential    Comprehensive metabolic panel    Lipid Panel with Direct LDL reflex    T4, free    TSH, 3rd generation    Hemoglobin A1c (w/out EAG)    CBC and differential    Comprehensive metabolic panel    Lipid Panel with Direct LDL reflex    T4, free    TSH, 3rd generation    Hemoglobin A1c (w/out EAG)      3. Other specified hypothyroidism  CBC and differential    Comprehensive metabolic panel    Lipid Panel with Direct LDL reflex    T4, free    TSH, 3rd generation    Hemoglobin A1c (w/out EAG)    CBC  and differential    Comprehensive metabolic panel    Lipid Panel with Direct LDL reflex    T4, free    TSH, 3rd generation    Hemoglobin A1c (w/out EAG)      4. Glucose intolerance  CBC and differential    Comprehensive metabolic panel    Lipid Panel with Direct LDL reflex    T4, free    TSH, 3rd generation    Hemoglobin A1c (w/out EAG)    CBC and differential    Comprehensive metabolic panel    Lipid Panel with Direct LDL reflex    T4, free    TSH, 3rd generation    Hemoglobin A1c (w/out EAG)          Health Maintenance Due   Topic Date Due    HIV Screening  Never done    Zoster Vaccine (1 of 2) Never done    RSV Vaccine Age 60+ Years (1 - 1-dose 60+ series) Never done    COVID-19 Vaccine (5 - 2023-24 season) 09/01/2023

## 2024-06-01 DIAGNOSIS — R06.2 WHEEZE: ICD-10-CM

## 2024-06-02 RX ORDER — ALBUTEROL SULFATE 90 UG/1
2 AEROSOL, METERED RESPIRATORY (INHALATION) EVERY 6 HOURS PRN
Qty: 8 G | Refills: 5 | Status: SHIPPED | OUTPATIENT
Start: 2024-06-02

## 2024-08-21 ENCOUNTER — OFFICE VISIT (OUTPATIENT)
Dept: FAMILY MEDICINE CLINIC | Facility: CLINIC | Age: 64
End: 2024-08-21
Payer: COMMERCIAL

## 2024-08-21 VITALS
DIASTOLIC BLOOD PRESSURE: 78 MMHG | SYSTOLIC BLOOD PRESSURE: 120 MMHG | HEART RATE: 85 BPM | OXYGEN SATURATION: 99 % | WEIGHT: 170 LBS | BODY MASS INDEX: 30.11 KG/M2

## 2024-08-21 DIAGNOSIS — B02.9 HERPES ZOSTER WITHOUT COMPLICATION: Primary | ICD-10-CM

## 2024-08-21 PROCEDURE — 99213 OFFICE O/P EST LOW 20 MIN: CPT | Performed by: NURSE PRACTITIONER

## 2024-08-21 RX ORDER — ACETAMINOPHEN AND CODEINE PHOSPHATE 300; 30 MG/1; MG/1
1 TABLET ORAL EVERY 6 HOURS PRN
Qty: 28 TABLET | Refills: 0 | Status: SHIPPED | OUTPATIENT
Start: 2024-08-21

## 2024-08-21 RX ORDER — VALACYCLOVIR HYDROCHLORIDE 1 G/1
1000 TABLET, FILM COATED ORAL 3 TIMES DAILY
Qty: 21 TABLET | Refills: 0 | Status: SHIPPED | OUTPATIENT
Start: 2024-08-21 | End: 2024-08-28

## 2024-08-21 NOTE — PROGRESS NOTES
Ambulatory Visit  Name: Cece Massey      : 1960      MRN: [id number]  Encounter Provider: JACEY Yoon  Encounter Date: 2024   Encounter department: [facility]    Assessment & Plan   1. Herpes zoster without complication  Assessment & Plan:  Shingles rash present right flank-mild to moderate pain. Valtrex ordered. Take Tylenol-Codeine as prescribed for pain.   Orders:  -     valACYclovir (VALTREX) 1,000 mg tablet; Take 1 tablet (1,000 mg total) by mouth 3 (three) times a day for 7 days  -     acetaminophen-codeine (TYLENOL/CODEINE #3) 300-30 MG per tablet; Take 1 tablet by mouth every 6 (six) hours as needed for moderate pain       History of Present Illness     Started with tingling pain right flank on Friday it subsided then returned the next day broke out in rash on right flank. Had shingles in the past took Tylenol with codeine which provided relief of pain. Never had shingle vaccine.        Review of Systems   Constitutional:  Negative for activity change and fever.   Respiratory:  Negative for shortness of breath.    Cardiovascular:  Negative for chest pain.   Gastrointestinal:  Negative for abdominal pain.   Genitourinary:  Negative for difficulty urinating.   Skin:  Positive for color change and rash.        shingles   Neurological:  Negative for dizziness and weakness.   Psychiatric/Behavioral:  Negative for dysphoric mood.        Objective     /78 (BP Location: Left arm, Patient Position: Sitting, Cuff Size: Standard)   Pulse 85   Wt 77.1 kg (170 lb)   LMP  (LMP Unknown)   SpO2 99%   BMI 30.11 kg/m²     Physical Exam  Vitals and nursing note reviewed.   Constitutional:       General: She is not in acute distress.     Appearance: Normal appearance. She is not ill-appearing.   HENT:      Head: Normocephalic.   Eyes:      Extraocular Movements: Extraocular movements intact.   Cardiovascular:      Rate and Rhythm: Normal rate and regular  rhythm.   Pulmonary:      Effort: Pulmonary effort is normal. No respiratory distress.      Breath sounds: Normal breath sounds. No wheezing.   Skin:     Findings: Erythema and rash present.      Comments: Positive Shingles rash right flank no active drainage.   Neurological:      Mental Status: She is alert and oriented to person, place, and time.   Psychiatric:         Mood and Affect: Mood normal.         Behavior: Behavior normal.

## 2024-08-21 NOTE — ASSESSMENT & PLAN NOTE
Shingles rash present right flank-mild to moderate pain. Valtrex ordered. Take Tylenol-Codeine as prescribed for pain.

## 2024-08-21 NOTE — PATIENT INSTRUCTIONS
"Patient Education     Shingles   The Basics   Written by the doctors and editors at [facility]   What is shingles? -- Shingles is a painful rash that is usually shaped like a band (picture 1). It can affect people of all ages, but it is most common in those older than 50. It is also more common in people whose immune system (the body's infection-fighting system) is weaker than normal. Another name for shingles is \"herpes zoster.\"  Shingles is caused by a virus called varicella-zoster virus. This is the same virus that causes chickenpox. After someone has chickenpox, the virus sometimes hides out, \"asleep\" in the body. Years later, it can \"wake up\" and cause shingles. The first time a person is infected with that virus, they get chickenpox, not shingles.  Is shingles contagious? -- In a way, yes. It is not possible to \"catch\" shingles from someone who has the rash. But if you have never had chickenpox or gotten the chickenpox vaccine, it is possible to \"catch\" the virus and then get sick with chickenpox. Shingles and chickenpox are caused by the same virus.  You probably will not catch the virus (or get chickenpox) if you:   Had chickenpox or shingles in the past   Had the chickenpox vaccine   Were born in the  before 1980 (most people born before 1980 have had chickenpox even if they don't remember it)  If you have never had chickenpox or the chickenpox vaccine, avoid contact with anyone who has shingles. It is especially important that you do not touch their rash. If you do, you could get sick with chickenpox. In rare cases, it is possible to get chickenpox from just being near someone with shingles.  Some people have a higher risk than others for getting very sick or having other problems because of chickenpox. People at highest risk include:   People who are pregnant - Pregnant people can pass the chickenpox virus to their growing baby.   Premature babies   People whose immune system (the body's " "infection-fighting system) is weaker than normal  What are the symptoms of shingles? -- At first, shingles causes weird sensations on your skin. You might feel itching, burning, pain, or tingling. Some people get a fever, feel sick, or get a headache. Within 1 to 2 days, a rash with blisters appears. Blisters most often appear in a band across the chest and back (picture 1 and picture 2). But they can show up on other parts of the body, too. The blisters cause pain that can be mild or severe.  Within 3 to 4 days, shingles blisters can become open sores or \"ulcers.\" These ulcers can sometimes get infected. Within 7 to 10 days, the rash should scab over and start to heal.  Can shingles be serious? -- Yes. Shingles can be serious, but this is rare. About 1 out of 10 people with shingles will get something called \"postherpetic neuralgia\" (\"PHN\"). People with PHN keep feeling pain or discomfort even after their rash goes away. This pain can last for months or even years. It can be so bad that it makes it hard to sleep, causes weight loss, and leads to depression.  Shingles can also cause:   Skin infections   Eye problems (if the rash is near the eye)   Ear problems (if the rash is near the ear)  In rare cases, shingles can cause serious problems with the brain or nerves. But this is very uncommon.  Will I need tests? -- It depends. Your doctor or nurse will probably be able to tell if you have shingles by doing an exam and asking about your symptoms. In some cases, they might take a sample of fluid from your rash for testing.  If you have a rash that you think might be shingles, call your doctor or nurse right away. They will do an exam and might recommend treatment.  How is shingles treated? -- It depends on how long you have had the shingles rash:   If you have had the rash for less than 3 days, your doctor will prescribe a medicine to help you get rid of the virus. These medicines are called \"antivirals.\" They can " speed your recovery and lower the chances of problems such as PHN.   If you have had the rash for more than 3 days, your doctor might or might not prescribe medicine. Antiviral medicine might help if new blisters are still appearing, or if your immune system is weaker than normal.  Many people can use non-prescription pain medicines to treat their pain. But some people need prescription medicines.  Is there anything I can do on my own to feel better? -- Yes. You can:   Take all of your medicines as instructed.   Keep your rash clean and dry. Do not use creams or gels unless your doctor or nurse says that you should.   Try not to scratch your skin. It might help to cover it with a clean dressing.   Wear loose clothing if this makes you more comfortable.  Can shingles be prevented? -- People can lower their chances of getting shingles by getting the shingles vaccine. The vaccine might also make shingles symptoms milder if they do occur.  The shingles vaccine is typically recommended for adults over 50 years. It might also be recommended for younger adults, if their immune system is weaker than normal. Your doctor can tell you if you should get a shingles vaccine.  If you do get shingles, you can prevent spreading the virus to other people if you:   Keep your rash covered.   Wash your hands often until your rash has scabbed over.  When should I call the doctor? -- Call your doctor or nurse right away if you think that you might have shingles. The sooner you can start treatment, the better.  If you are already being treated for shingles, call your doctor or nurse if:   Your pain gets worse and is not helped by over-the-counter medicines.   You have increased redness or swelling around your rash.   You get a fever.   You have eye symptoms like redness, irritation, or vision problems.   You have ear symptoms like pain or trouble hearing.  All topics are updated as new evidence becomes available and our peer review process  is complete.  This topic retrieved from [employer] on: Mar 20, 2024.  Topic 74728 Version 17.0  Release: 32.2.4 - C32.78  © 2024 UpToDate, Inc. and/or its affiliates. All rights reserved.  picture 1: Shingles (herpes zoster)     Graphic 72210 Version 2.0  picture 2: Shingles (herpes zoster)     Graphic 87680 Version 2.0  Consumer Information Use and Disclaimer   Disclaimer: This generalized information is a limited summary of diagnosis, treatment, and/or medication information. It is not meant to be comprehensive and should be used as a tool to help the user understand and/or assess potential diagnostic and treatment options. It does NOT include all information about conditions, treatments, medications, side effects, or risks that may apply to a specific patient. It is not intended to be medical advice or a substitute for the medical advice, diagnosis, or treatment of a health care provider based on the health care provider's examination and assessment of a patient's specific and unique circumstances. Patients must speak with a health care provider for complete information about their health, medical questions, and treatment options, including any risks or benefits regarding use of medications. This information does not endorse any treatments or medications as safe, effective, or approved for treating a specific patient. UpToDate, Inc. and its affiliates disclaim any warranty or liability relating to this information or the use thereof.The use of this information is governed by the Terms of Use, available at https://www.wolterskluwer.com/en/know/clinical-effectiveness-terms. 2024© UpToDate, Inc. and its affiliates and/or licensors. All rights reserved.  Copyright   © 2024 UpToDate, Inc. and/or its affiliates. All rights reserved.

## 2024-08-22 ENCOUNTER — PATIENT MESSAGE (OUTPATIENT)
Dept: FAMILY MEDICINE CLINIC | Facility: CLINIC | Age: 64
End: 2024-08-22

## 2024-08-27 ENCOUNTER — TELEPHONE (OUTPATIENT)
Age: 64
End: 2024-08-27

## 2024-08-27 DIAGNOSIS — B02.29 NEURALGIA, POSTHERPETIC: Primary | ICD-10-CM

## 2024-08-27 RX ORDER — PREGABALIN 75 MG/1
75 CAPSULE ORAL 2 TIMES DAILY
Qty: 60 CAPSULE | Refills: 0 | Status: SHIPPED | OUTPATIENT
Start: 2024-08-27

## 2024-08-27 NOTE — TELEPHONE ENCOUNTER
Pt called, seen in office on 8/21 for the same. Reports dx with shingles to right flank area. Was taking tylenol with codeine at night for pain, and pain was manageable. Pt reports pain is currently excruciating, has gotten worse over the past two days. Blisters have popped. Pt would like to know if she can take a medication more geared toward nerve pain like gabapentin or lyrica because current pain medication is not helping.     Please inform PCP and return call. Thank you

## 2024-09-24 ENCOUNTER — PRE-ADMISSION TESTING (OUTPATIENT)
Dept: PREADMISSION TESTING | Age: 64
End: 2024-09-24
Payer: COMMERCIAL

## 2024-09-24 RX ORDER — ALBUTEROL SULFATE 90 UG/1
2 INHALANT RESPIRATORY (INHALATION) EVERY 6 HOURS PRN
COMMUNITY

## 2024-09-24 RX ORDER — LEVOTHYROXINE SODIUM 100 UG/1
100 TABLET ORAL
COMMUNITY

## 2024-09-24 RX ORDER — PREGABALIN 75 MG/1
75 CAPSULE ORAL EVERY EVENING
COMMUNITY

## 2024-09-24 RX ORDER — FAMOTIDINE 20 MG/1
20 TABLET, FILM COATED ORAL 2 TIMES DAILY
Status: ON HOLD | COMMUNITY
End: 2024-10-10

## 2024-09-24 RX ORDER — METOPROLOL SUCCINATE 50 MG/1
50 TABLET, EXTENDED RELEASE ORAL EVERY EVENING
COMMUNITY

## 2024-09-24 RX ORDER — EZETIMIBE 10 MG/1
10 TABLET ORAL NIGHTLY
COMMUNITY

## 2024-09-24 RX ORDER — OLMESARTAN MEDOXOMIL 40 MG/1
40 TABLET ORAL DAILY
COMMUNITY

## 2024-09-24 RX ORDER — PRAVASTATIN SODIUM 40 MG/1
40 TABLET ORAL DAILY
COMMUNITY

## 2024-09-24 NOTE — PRE-PROCEDURE INSTRUCTIONS
[address] [address]    1.       We will call you between 3 pm and 7 pm on 10/8 to inform you of arrival time for your procedure. If you do not hear by 6PM, please call [phone] for arrival time.     2.        Please arrive through the Main Entrance of the [facility] (across from the parking garage) and report to the Surgery Registration Desk on the day of your procedure.    3. Please follow these fasting guidelines:     No solid food EIGHT HOURS prior to arrival time on day of surgery.  6 ounces of clear liquids, meaning water or PLAIN black coffee WITHOUT any milk, cream, sugar, or sweetener are permitted up to TWO HOURS prior to arrival at the hospital.    4. Please take ONLY the following medications with a sip of water on the morning of your procedure: take famotidine and metoprolol. May take albuterol if needed and bring your inhaler with you.    5. Other Instructions:   You may brush your teeth the morning of the procedure. Rinse and spit, do not swallow.    Bring a list of your medications with dosages.    Use surgical wash as directed.   [facility]  Patient Education Preoperative Showers    Good hygiene, such as frequent handwashing and daily skin cleansing, promotes good health. Daily skin cleansing helps remove germs that may cause infections. The following instructions should be followed to help reduce germs on your skin prior to your surgical procedure.     Bactoshield/Hibiclens CHG 4% is an antiseptic soap. The active ingredient is chlorhexidine gluconate. Do not use this product if you are allergic to chlorhexidine gluconate.  The NIGHT before and the MORNING of your procedure, shower or bathe with Bactoshield. This product should replace your regular soap used for cleansing most of your body surfaces. Bactoshield should not be used on your head or face; keep out of your eyes, ears and mouth.  If you plan to wash your hair, do so with your regular shampoo.  Then, rinse the hair and your body thoroughly to remove any shampoo residue.  Wash your face with regular soap and water only.  Wash your genital area with soap and water only.  Thoroughly rinse your body with warm water from the neck down.  Apply the minimum amount of Bactoshield to cover the skin. Use this product as you would any liquid soap. Leave this on for 2 minutes. NOTE- Bactoshield DOES NOT LATHER like normal soap.   Wash the skin gently and rinse thoroughly with warm water. You do not need to scrub the skin to remove germs.  Do not use regular soap after you have applied and rinsed the Bactoshield.  Change into clean clothes after each shower.   Do not apply any lotions, powders, or perfumes to the body areas that have been cleansed with Bactoshield.  No use of hair removal products or shaving at or near the surgical site 48 hours before your procedure. (72 hours for cardiac patients.)  For those having perineal surgeries (i.e.: vaginal, rectal or cystoscopy) - please use Dial soap.    6. If you develop a cold, cough, fever, rash, or other symptom prior to the day of the procedure, please report it to your physician immediately.    7. If you need to cancel the procedure for any reason, please contact your physician.    8. Make arrangements to have safe transportation home accompanied by a responsible adult. If you have not arranged safe transportation home, your surgery will be cancelled. Safe transportation may include private vehicle, ride-share service, taxi and public transportation when accompanied by a responsible adult who will assist you home. A responsible adult is someone known to you and does not include the taxi, ride-share or public transit drive transporting you.    9.  If it is medically necessary for you to have a longer stay, you will be informed as soon as the decision is made.    10. Only bring essential items to the hospital.  Do not wear or bring anything of value to the hospital  including jewelry of any kind, money, or wallet. Do not wear make-up or contact lenses.  DO NOT BRING MEDICATIONS FROM HOME unless instructed to do so. DO bring your hearing aids, glasses, and a case    11. No lotion, creams, powders, or oils on skin the morning of procedure     12. Dress in comfortable clothes.    13.  If instructed, please bring a copy of your Advanced Directive (Living Will/Durable Power of ) on the day of your procedure.     14. Ensuring your safety at all times is a very important part of our [facility] Culture of Safety. After having surgery and sedation, you are at risk for falling and balance issues. Although you may feel awake, the effects of the medication can last up to 24 hours after anesthesia. If you need to use the bathroom during your recovery period, nursing staff will escort you there and stay with you to ensure your safety.    15. Refrain from drinking alcohol and smoking cigarettes for 24 hours prior to surgery.    16. Shower with antibacterial soap (Dial) the night before and morning of your procedure.  If your procedure indicates the need for CHG antiseptic wash (Bactoshield or Hibiclens), please use this instead and follow instructions as discussed at the time of your Pre-Admission Testing phone interview or visit.    Above instructions reviewed with patient and patient acknowledges understanding.    Form explained by: Leila Gilliland RN

## 2024-10-01 ENCOUNTER — TRANSCRIBE ORDERS (OUTPATIENT)
Dept: LAB | Facility: HOSPITAL | Age: 64
End: 2024-10-01

## 2024-10-01 ENCOUNTER — APPOINTMENT (OUTPATIENT)
Dept: LAB | Facility: HOSPITAL | Age: 64
End: 2024-10-01
Attending: ORTHOPAEDIC SURGERY
Payer: COMMERCIAL

## 2024-10-01 ENCOUNTER — PRE-ADMISSION TESTING (OUTPATIENT)
Dept: PREADMISSION TESTING | Facility: HOSPITAL | Age: 64
End: 2024-10-01
Attending: ORTHOPAEDIC SURGERY
Payer: COMMERCIAL

## 2024-10-01 VITALS
BODY MASS INDEX: 29.02 KG/M2 | OXYGEN SATURATION: 98 % | SYSTOLIC BLOOD PRESSURE: 137 MMHG | WEIGHT: 170 LBS | HEART RATE: 70 BPM | TEMPERATURE: 97 F | RESPIRATION RATE: 18 BRPM | DIASTOLIC BLOOD PRESSURE: 77 MMHG | HEIGHT: 64 IN

## 2024-10-01 DIAGNOSIS — M16.12 UNILATERAL PRIMARY OSTEOARTHRITIS, LEFT HIP: ICD-10-CM

## 2024-10-01 DIAGNOSIS — E78.2 MIXED HYPERLIPIDEMIA: ICD-10-CM

## 2024-10-01 DIAGNOSIS — I10 BENIGN ESSENTIAL HTN: ICD-10-CM

## 2024-10-01 DIAGNOSIS — M16.12 PRIMARY OSTEOARTHRITIS OF LEFT HIP: ICD-10-CM

## 2024-10-01 DIAGNOSIS — J45.20 MILD INTERMITTENT ASTHMA WITHOUT COMPLICATION: ICD-10-CM

## 2024-10-01 DIAGNOSIS — R94.31 PROLONGED QT INTERVAL: ICD-10-CM

## 2024-10-01 DIAGNOSIS — I48.0 PAF (PAROXYSMAL ATRIAL FIBRILLATION) (CMS/HCC): ICD-10-CM

## 2024-10-01 DIAGNOSIS — M16.12 UNILATERAL PRIMARY OSTEOARTHRITIS, LEFT HIP: Primary | ICD-10-CM

## 2024-10-01 DIAGNOSIS — Z01.818 PREOP EXAMINATION: Primary | ICD-10-CM

## 2024-10-01 DIAGNOSIS — I25.10 CORONARY ARTERY DISEASE INVOLVING NATIVE CORONARY ARTERY OF NATIVE HEART WITHOUT ANGINA PECTORIS: ICD-10-CM

## 2024-10-01 LAB
ABO + RH BLD: NORMAL
ALBUMIN SERPL-MCNC: 4.1 G/DL (ref 3.5–5.7)
ALP SERPL-CCNC: 44 IU/L (ref 34–125)
ALT SERPL-CCNC: 15 IU/L (ref 7–52)
ANION GAP SERPL CALC-SCNC: 6 MEQ/L (ref 3–15)
AST SERPL-CCNC: 20 IU/L (ref 13–39)
BILIRUB SERPL-MCNC: 0.5 MG/DL (ref 0.3–1.2)
BLD GP AB SCN SERPL QL: NEGATIVE
BLOOD BANK CMNT PATIENT-IMP: NORMAL
BUN SERPL-MCNC: 14 MG/DL (ref 7–25)
CALCIUM SERPL-MCNC: 9.2 MG/DL (ref 8.6–10.3)
CHLORIDE SERPL-SCNC: 108 MEQ/L (ref 98–107)
CO2 SERPL-SCNC: 27 MEQ/L (ref 21–31)
CREAT SERPL-MCNC: 0.8 MG/DL (ref 0.6–1.2)
D AG BLD QL: POSITIVE
EGFRCR SERPLBLD CKD-EPI 2021: >60 ML/MIN/1.73M*2
ERYTHROCYTE [DISTWIDTH] IN BLOOD BY AUTOMATED COUNT: 14.4 % (ref 11.7–14.4)
EST. AVERAGE GLUCOSE BLD GHB EST-MCNC: 114 MG/DL
GLUCOSE SERPL-MCNC: 95 MG/DL (ref 70–99)
HBA1C MFR BLD: 5.6 %
HCT VFR BLD AUTO: 36.2 % (ref 35–45)
HGB BLD-MCNC: 11.8 G/DL (ref 11.8–15.7)
LABORATORY COMMENT REPORT: NORMAL
MCH RBC QN AUTO: 29.4 PG (ref 28–33.2)
MCHC RBC AUTO-ENTMCNC: 32.6 G/DL (ref 32.2–35.5)
MCV RBC AUTO: 90 FL (ref 83–98)
PDW BLD AUTO: 10.2 FL (ref 9.4–12.3)
PLATELET # BLD AUTO: 199 K/UL (ref 150–369)
POTASSIUM SERPL-SCNC: 4.1 MEQ/L (ref 3.5–5.1)
PROT SERPL-MCNC: 6.7 G/DL (ref 6–8.2)
RBC # BLD AUTO: 4.02 M/UL (ref 3.93–5.22)
SODIUM SERPL-SCNC: 141 MEQ/L (ref 136–145)
SPECIMEN EXP DATE BLD: NORMAL
WBC # BLD AUTO: 7.94 K/UL (ref 3.8–10.5)

## 2024-10-01 PROCEDURE — 85027 COMPLETE CBC AUTOMATED: CPT

## 2024-10-01 PROCEDURE — 86901 BLOOD TYPING SEROLOGIC RH(D): CPT

## 2024-10-01 PROCEDURE — 99203 OFFICE O/P NEW LOW 30 MIN: CPT | Performed by: INTERNAL MEDICINE

## 2024-10-01 PROCEDURE — 83036 HEMOGLOBIN GLYCOSYLATED A1C: CPT

## 2024-10-01 PROCEDURE — 80053 COMPREHEN METABOLIC PANEL: CPT

## 2024-10-01 PROCEDURE — 36415 COLL VENOUS BLD VENIPUNCTURE: CPT

## 2024-10-04 PROBLEM — I10 BENIGN ESSENTIAL HTN: Status: ACTIVE | Noted: 2024-10-04

## 2024-10-04 PROBLEM — J45.20 MILD INTERMITTENT ASTHMA WITHOUT COMPLICATION: Status: ACTIVE | Noted: 2024-10-04

## 2024-10-04 PROBLEM — I25.10 CORONARY ARTERY DISEASE INVOLVING NATIVE CORONARY ARTERY OF NATIVE HEART WITHOUT ANGINA PECTORIS: Status: ACTIVE | Noted: 2024-10-04

## 2024-10-04 PROBLEM — M16.12 PRIMARY OSTEOARTHRITIS OF LEFT HIP: Status: ACTIVE | Noted: 2024-10-04

## 2024-10-04 PROBLEM — I48.0 PAF (PAROXYSMAL ATRIAL FIBRILLATION) (CMS/HCC): Status: ACTIVE | Noted: 2024-10-04

## 2024-10-04 PROBLEM — E78.2 MIXED HYPERLIPIDEMIA: Status: ACTIVE | Noted: 2024-10-04

## 2024-10-04 PROBLEM — Z01.818 PREOP EXAMINATION: Status: ACTIVE | Noted: 2024-10-04

## 2024-10-04 PROBLEM — R94.31 PROLONGED QT INTERVAL: Status: ACTIVE | Noted: 2024-10-04

## 2024-10-04 NOTE — ASSESSMENT & PLAN NOTE
Pt states she's had issues with long QT in the past  Genetic testing negative for inherited long QT syndrome  Monitor on tele

## 2024-10-04 NOTE — CONSULTS
Division of Hospital Medicine -  Pre-Operative Consultation       Patient Name: Nidia Hopkins  Referring Surgeon: Calebr  Reason for Referral: Pre-Operative Evaluation  Surgical Procedure: Left Hip Arthroplasty  Operative Date: 10/9/24  Other Providers:      PCP: Bony Bentley MD          HISTORY OF PRESENT ILLNESS      Nidia Hopkins is a 64 y.o. female presenting today to the [facility] at  for pre-operative evaluation prior to planned surgery.      Chronic Narcotic Use > 90 days : No       In regards to medical history:  Pt is a 63 yo female with pmhx of CAD with stent, HLD, HTN, asthma, OA, Afib on Xarelto, prolonged QT (genetics test negative as per patient), IBS, hypothyroidism, who presented to  for preop evaluation prior to proposed hip surgery. Pt feeling well overall, denies any CP, SOB, TEMPLETON, orthopnea, PND. No abd pain, N/V/D/C. Pt follows with Dr. Mancini of [facility] cards in [place]. Pt had a recent visit and there were no concerns or followup testing needed. Pt had Shingles on right flank in August, rash has resolved, but pt has some postherpetic neuralgia pain there. No exertional symptoms when doing activities, limited only by hip pain. No bleeding events while on Xarelto. Pt knows to hold Xarelto 2-3 days prior to the surgery as directed by her cardiologist. ROS is otherwise negative.       The patient denies any current or recent chest pain or pressure, dyspnea, cough, sputum, fevers, chills, abdominal pain, nausea, vomiting, diarrhea or other symptoms.     Functionally, the patient is able to ascend a flight or so of stairs with no dyspnea or chest pain.     The patient denies, on specific questioning, the following:  No history of MI,or CHF.  No history of VANESSA.  No history of DVT/PE.  No history of COPD.  No history of CVA.  No history of DM.   No history of CKD.     PAST MEDICAL AND SURGICAL HISTORY      Past Medical History:   Diagnosis  Date    Arthritis     Asthma     Atrial fibrillation, unspecified type (CMS/[facility])     CAD (coronary artery disease)     Exocrine pancreatic insufficiency     Hypertension     IBS (irritable bowel syndrome)     Long Q-T syndrome     Mild exercise-induced asthma     Post herpetic neuralgia        Past Surgical History   Procedure Laterality Date    Abdominoplasty      Cardiac catheterization       section      x 3    Cholecystectomy      Colonoscopy      Coronary angioplasty with stent placement      Hysterectomy      Knee arthroscopy      Lumbar laminectomy      Tonsillectomy      Upper gastrointestinal endoscopy         MEDICATIONS        Current Outpatient Medications:     albuterol HFA 90 mcg/actuation inhaler, Inhale 2 puffs every 6 (six) hours as needed for wheezing., Disp: , Rfl:     ezetimibe (ZETIA) 10 mg tablet, Take 10 mg by mouth nightly., Disp: , Rfl:     famotidine (PEPCID) 20 mg tablet, Take 20 mg by mouth 2 (two) times a day., Disp: , Rfl:     levothyroxine (SYNTHROID) 100 mcg tablet, Take 100 mcg by mouth daily., Disp: , Rfl:     metoprolol succinate XL (TOPROL-XL) 50 mg 24 hr tablet, Take 50 mg by mouth every evening., Disp: , Rfl:     multivitamin tablet, Take 1 tablet by mouth daily., Disp: , Rfl:     olmesartan (BENICAR) 40 mg tablet, Take 40 mg by mouth daily., Disp: , Rfl:     pravastatin (PRAVACHOL) 40 mg tablet, Take 40 mg by mouth daily., Disp: , Rfl:     pregabalin (LYRICA) 75 mg capsule, Take 75 mg by mouth every evening., Disp: , Rfl:     rivaroxaban (XARELTO) 20 mg tablet, Take 20 mg by mouth daily with dinner., Disp: , Rfl:     ALLERGIES      Atorvastatin, Erythromycin, and Flecainide    FAMILY HISTORY      family history is not on file.    Denies any prior known family history of DVTs/PEs/clotting disorder    SOCIAL HISTORY      Social History     Tobacco Use    Smoking status: Former     Types: Cigarettes    Smokeless tobacco: Never   Substance Use Topics    Alcohol use: Not  "Currently    Drug use: Never       REVIEW OF SYSTEMS      All other systems reviewed and negative except as noted in HPI    PHYSICAL EXAMINATION      Visit Vitals  /77 (BP Location: Left upper arm, Patient Position: Sitting)   Pulse 70   Temp 36.1 °C (97 °F) (Temporal)   Resp 18   Ht 1.626 m (5' 4\")   Wt 77.1 kg (170 lb)   SpO2 98%   BMI 29.18 kg/m²     Body mass index is 29.18 kg/m².    Physical Exam  Vitals and nursing note reviewed.   Constitutional:       Appearance: She is not ill-appearing.   HENT:      Head: Normocephalic and atraumatic.      Right Ear: External ear normal.      Left Ear: External ear normal.      Nose: Nose normal.   Eyes:      General: No scleral icterus.        Right eye: No discharge.         Left eye: No discharge.   Cardiovascular:      Rate and Rhythm: Normal rate.      Heart sounds: Normal heart sounds.   Pulmonary:      Breath sounds: Normal breath sounds.   Abdominal:      General: There is no distension.   Skin:     Findings: No lesion or rash.   Neurological:      Mental Status: She is alert and oriented to person, place, and time. Mental status is at baseline.   Psychiatric:         Mood and Affect: Mood normal.         Behavior: Behavior normal.         LABS / EKG        Labs      Lab Results   Component Value Date     10/01/2024    K 4.1 10/01/2024     (H) 10/01/2024    BUN 14 10/01/2024    CREATININE 0.8 10/01/2024    WBC 7.94 10/01/2024    HGB 11.8 10/01/2024    HCT 36.2 10/01/2024     10/01/2024    ALT 15 10/01/2024    AST 20 10/01/2024    HGBA1C 5.6 10/01/2024         ECG/Telemetry  EKG: NSR, rate 60's, q in AVL, no acute or ischemic changes compared to prior EKG.       ASSESSMENT AND PLAN         PAF (paroxysmal atrial fibrillation) (CMS/HCC)  HR sble, cont with po meds  Holding Xarelto 2-3 days prior to surgery  Please resume after surgery if no bleeding events      Benign essential HTN  BP stable, cont with po meds  Low salt diet when not " NPO      Coronary artery disease involving native coronary artery of native heart without angina pectoris  Pt with stent in place  Cont with statin  BP control  Outpt cardio f/u  EKG without any acute or ischemic changes.       Mild intermittent asthma without complication  Stable, no acute wheezing  Cont with inhalers and nebs prn      Mixed hyperlipidemia  Stable, cont with po meds.       Primary osteoarthritis of left hip  Mgmt as per Ortho  OR on 10/9  Pain control  DVT proph  PT/OT afterwards      Prolonged QT interval  Pt states she's had issues with long QT in the past  Genetic testing negative for inherited long QT syndrome  Monitor on tele      Preop examination  Patient with h/o CAD and stent and Afib on anticoagulation. Pt without any acute symptoms, no exertional symptoms when doing daily activities. She has a normal heart and lung exam and stable vitals. Her labs are WNL and her EKG does not show any acute or ischemic changes.   She has not had a CVA before, is not on insulin.       Patient is a Low to Intermediate Risk patient for an Intermediate Risk ortho procedure, no further testing needed at this time.        In regards to perioperative cardiac risk:  Regarding perioperative cardiovascular risk:  RCRI Score is 1, Class 2 Risk (due to h//o CAD)      Further comments:  Resume supplements when OK with surgical team.  I would encourage incentive spirometry to assist with minimizing lily-operative pulmonary risk.  DVT prophylaxis and timing of such per the discretion of the surgeon.     Please do not hesitate to contact [facility] during the upcoming hospitalization with any questions or concerns.     Kingston Fisher MD  10/4/2024

## 2024-10-04 NOTE — ASSESSMENT & PLAN NOTE
Patient with h/o CAD and stent and Afib on anticoagulation. Pt without any acute symptoms, no exertional symptoms when doing daily activities. She has a normal heart and lung exam and stable vitals. Her labs are WNL and her EKG does not show any acute or ischemic changes.   She has not had a CVA before, is not on insulin.       Patient is a Low to Intermediate Risk patient for an Intermediate Risk ortho procedure, no further testing needed at this time.

## 2024-10-04 NOTE — ASSESSMENT & PLAN NOTE
HR sble, cont with po meds  Holding Xarelto 2-3 days prior to surgery  Please resume after surgery if no bleeding events

## 2024-10-04 NOTE — H&P (VIEW-ONLY)
Division of Hospital Medicine -  Pre-Operative Consultation       Patient Name: Nidia Hopkins  Referring Surgeon: Calebr  Reason for Referral: Pre-Operative Evaluation  Surgical Procedure: Left Hip Arthroplasty  Operative Date: 10/9/24  Other Providers:      PCP: Bony Bentley MD          HISTORY OF PRESENT ILLNESS      Nidia Hopkins is a 64 y.o. female presenting today to the [facility] at  for pre-operative evaluation prior to planned surgery.      Chronic Narcotic Use > 90 days : No       In regards to medical history:  Pt is a 65 yo female with pmhx of CAD with stent, HLD, HTN, asthma, OA, Afib on Xarelto, prolonged QT (genetics test negative as per patient), IBS, hypothyroidism, who presented to  for preop evaluation prior to proposed hip surgery. Pt feeling well overall, denies any CP, SOB, TEMPLETON, orthopnea, PND. No abd pain, N/V/D/C. Pt follows with Dr. Mancini of [facility] cards in [place]. Pt had a recent visit and there were no concerns or followup testing needed. Pt had Shingles on right flank in August, rash has resolved, but pt has some postherpetic neuralgia pain there. No exertional symptoms when doing activities, limited only by hip pain. No bleeding events while on Xarelto. Pt knows to hold Xarelto 2-3 days prior to the surgery as directed by her cardiologist. ROS is otherwise negative.       The patient denies any current or recent chest pain or pressure, dyspnea, cough, sputum, fevers, chills, abdominal pain, nausea, vomiting, diarrhea or other symptoms.     Functionally, the patient is able to ascend a flight or so of stairs with no dyspnea or chest pain.     The patient denies, on specific questioning, the following:  No history of MI,or CHF.  No history of VANESSA.  No history of DVT/PE.  No history of COPD.  No history of CVA.  No history of DM.   No history of CKD.     PAST MEDICAL AND SURGICAL HISTORY      Past Medical History:   Diagnosis  Date    Arthritis     Asthma     Atrial fibrillation, unspecified type (CMS/[facility])     CAD (coronary artery disease)     Exocrine pancreatic insufficiency     Hypertension     IBS (irritable bowel syndrome)     Long Q-T syndrome     Mild exercise-induced asthma     Post herpetic neuralgia        Past Surgical History   Procedure Laterality Date    Abdominoplasty      Cardiac catheterization       section      x 3    Cholecystectomy      Colonoscopy      Coronary angioplasty with stent placement      Hysterectomy      Knee arthroscopy      Lumbar laminectomy      Tonsillectomy      Upper gastrointestinal endoscopy         MEDICATIONS        Current Outpatient Medications:     albuterol HFA 90 mcg/actuation inhaler, Inhale 2 puffs every 6 (six) hours as needed for wheezing., Disp: , Rfl:     ezetimibe (ZETIA) 10 mg tablet, Take 10 mg by mouth nightly., Disp: , Rfl:     famotidine (PEPCID) 20 mg tablet, Take 20 mg by mouth 2 (two) times a day., Disp: , Rfl:     levothyroxine (SYNTHROID) 100 mcg tablet, Take 100 mcg by mouth daily., Disp: , Rfl:     metoprolol succinate XL (TOPROL-XL) 50 mg 24 hr tablet, Take 50 mg by mouth every evening., Disp: , Rfl:     multivitamin tablet, Take 1 tablet by mouth daily., Disp: , Rfl:     olmesartan (BENICAR) 40 mg tablet, Take 40 mg by mouth daily., Disp: , Rfl:     pravastatin (PRAVACHOL) 40 mg tablet, Take 40 mg by mouth daily., Disp: , Rfl:     pregabalin (LYRICA) 75 mg capsule, Take 75 mg by mouth every evening., Disp: , Rfl:     rivaroxaban (XARELTO) 20 mg tablet, Take 20 mg by mouth daily with dinner., Disp: , Rfl:     ALLERGIES      Atorvastatin, Erythromycin, and Flecainide    FAMILY HISTORY      family history is not on file.    Denies any prior known family history of DVTs/PEs/clotting disorder    SOCIAL HISTORY      Social History     Tobacco Use    Smoking status: Former     Types: Cigarettes    Smokeless tobacco: Never   Substance Use Topics    Alcohol use: Not  "Currently    Drug use: Never       REVIEW OF SYSTEMS      All other systems reviewed and negative except as noted in HPI    PHYSICAL EXAMINATION      Visit Vitals  /77 (BP Location: Left upper arm, Patient Position: Sitting)   Pulse 70   Temp 36.1 °C (97 °F) (Temporal)   Resp 18   Ht 1.626 m (5' 4\")   Wt 77.1 kg (170 lb)   SpO2 98%   BMI 29.18 kg/m²     Body mass index is 29.18 kg/m².    Physical Exam  Vitals and nursing note reviewed.   Constitutional:       Appearance: She is not ill-appearing.   HENT:      Head: Normocephalic and atraumatic.      Right Ear: External ear normal.      Left Ear: External ear normal.      Nose: Nose normal.   Eyes:      General: No scleral icterus.        Right eye: No discharge.         Left eye: No discharge.   Cardiovascular:      Rate and Rhythm: Normal rate.      Heart sounds: Normal heart sounds.   Pulmonary:      Breath sounds: Normal breath sounds.   Abdominal:      General: There is no distension.   Skin:     Findings: No lesion or rash.   Neurological:      Mental Status: She is alert and oriented to person, place, and time. Mental status is at baseline.   Psychiatric:         Mood and Affect: Mood normal.         Behavior: Behavior normal.         LABS / EKG        Labs      Lab Results   Component Value Date     10/01/2024    K 4.1 10/01/2024     (H) 10/01/2024    BUN 14 10/01/2024    CREATININE 0.8 10/01/2024    WBC 7.94 10/01/2024    HGB 11.8 10/01/2024    HCT 36.2 10/01/2024     10/01/2024    ALT 15 10/01/2024    AST 20 10/01/2024    HGBA1C 5.6 10/01/2024         ECG/Telemetry  EKG: NSR, rate 60's, q in AVL, no acute or ischemic changes compared to prior EKG.       ASSESSMENT AND PLAN         PAF (paroxysmal atrial fibrillation) (CMS/HCC)  HR sble, cont with po meds  Holding Xarelto 2-3 days prior to surgery  Please resume after surgery if no bleeding events      Benign essential HTN  BP stable, cont with po meds  Low salt diet when not " NPO      Coronary artery disease involving native coronary artery of native heart without angina pectoris  Pt with stent in place  Cont with statin  BP control  Outpt cardio f/u  EKG without any acute or ischemic changes.       Mild intermittent asthma without complication  Stable, no acute wheezing  Cont with inhalers and nebs prn      Mixed hyperlipidemia  Stable, cont with po meds.       Primary osteoarthritis of left hip  Mgmt as per Ortho  OR on 10/9  Pain control  DVT proph  PT/OT afterwards      Prolonged QT interval  Pt states she's had issues with long QT in the past  Genetic testing negative for inherited long QT syndrome  Monitor on tele      Preop examination  Patient with h/o CAD and stent and Afib on anticoagulation. Pt without any acute symptoms, no exertional symptoms when doing daily activities. She has a normal heart and lung exam and stable vitals. Her labs are WNL and her EKG does not show any acute or ischemic changes.   She has not had a CVA before, is not on insulin.       Patient is a Low to Intermediate Risk patient for an Intermediate Risk ortho procedure, no further testing needed at this time.        In regards to perioperative cardiac risk:  Regarding perioperative cardiovascular risk:  RCRI Score is 1, Class 2 Risk (due to h//o CAD)      Further comments:  Resume supplements when OK with surgical team.  I would encourage incentive spirometry to assist with minimizing lily-operative pulmonary risk.  DVT prophylaxis and timing of such per the discretion of the surgeon.     Please do not hesitate to contact [facility] during the upcoming hospitalization with any questions or concerns.     Kingston Fisher MD  10/4/2024

## 2024-10-04 NOTE — ASSESSMENT & PLAN NOTE
Pt with stent in place  Cont with statin  BP control  Outpt cardio f/u  EKG without any acute or ischemic changes.

## 2024-10-07 ENCOUNTER — ANESTHESIA EVENT (OUTPATIENT)
Dept: OPERATING ROOM | Facility: HOSPITAL | Age: 64
Setting detail: HOSPITAL OUTPATIENT SURGERY
End: 2024-10-07
Payer: COMMERCIAL

## 2024-10-09 ENCOUNTER — HOSPITAL ENCOUNTER (OUTPATIENT)
Facility: HOSPITAL | Age: 64
Discharge: HOME | End: 2024-10-10
Attending: ORTHOPAEDIC SURGERY | Admitting: ORTHOPAEDIC SURGERY
Payer: COMMERCIAL

## 2024-10-09 ENCOUNTER — APPOINTMENT (OUTPATIENT)
Dept: RADIOLOGY | Facility: HOSPITAL | Age: 64
End: 2024-10-09
Attending: ORTHOPAEDIC SURGERY
Payer: COMMERCIAL

## 2024-10-09 ENCOUNTER — HOSPITAL ENCOUNTER (OUTPATIENT)
Dept: RADIOLOGY | Facility: HOSPITAL | Age: 64
Setting detail: HOSPITAL OUTPATIENT SURGERY
End: 2024-10-09
Attending: ORTHOPAEDIC SURGERY
Payer: COMMERCIAL

## 2024-10-09 ENCOUNTER — ANESTHESIA (OUTPATIENT)
Dept: OPERATING ROOM | Facility: HOSPITAL | Age: 64
Setting detail: HOSPITAL OUTPATIENT SURGERY
End: 2024-10-09
Payer: COMMERCIAL

## 2024-10-09 DIAGNOSIS — M16.12 PRIMARY OSTEOARTHRITIS OF LEFT HIP: Primary | ICD-10-CM

## 2024-10-09 PROBLEM — I10 HTN (HYPERTENSION): Status: ACTIVE | Noted: 2024-10-09

## 2024-10-09 PROBLEM — E78.5 HLD (HYPERLIPIDEMIA): Status: ACTIVE | Noted: 2024-10-09

## 2024-10-09 PROBLEM — I48.91 ATRIAL FIBRILLATION (CMS/HCC): Status: ACTIVE | Noted: 2024-10-09

## 2024-10-09 PROBLEM — J45.909 ASTHMA: Status: ACTIVE | Noted: 2024-10-09

## 2024-10-09 PROBLEM — E03.9 HYPOTHYROIDISM: Status: ACTIVE | Noted: 2024-10-09

## 2024-10-09 PROBLEM — M19.90 OSTEOARTHRITIS: Status: ACTIVE | Noted: 2024-10-09

## 2024-10-09 LAB
ABO + RH BLD: NORMAL
D AG BLD QL: POSITIVE

## 2024-10-09 PROCEDURE — 0SRB0JZ REPLACEMENT OF LEFT HIP JOINT WITH SYNTHETIC SUBSTITUTE, OPEN APPROACH: ICD-10-PCS | Performed by: ORTHOPAEDIC SURGERY

## 2024-10-09 PROCEDURE — 25800000 HC PHARMACY IV SOLUTIONS: Performed by: ORTHOPAEDIC SURGERY

## 2024-10-09 PROCEDURE — 37000001 HC ANESTHESIA GENERAL: Performed by: ORTHOPAEDIC SURGERY

## 2024-10-09 PROCEDURE — 72170 X-RAY EXAM OF PELVIS: CPT | Mod: 59

## 2024-10-09 PROCEDURE — 63600000 HC DRUGS/DETAIL CODE: Mod: JZ | Performed by: ORTHOPAEDIC SURGERY

## 2024-10-09 PROCEDURE — 36000005 HC OR LEVEL 5 INITIAL 30MIN: Performed by: ORTHOPAEDIC SURGERY

## 2024-10-09 PROCEDURE — C1776 JOINT DEVICE (IMPLANTABLE): HCPCS | Performed by: ORTHOPAEDIC SURGERY

## 2024-10-09 PROCEDURE — 63700000 HC SELF-ADMINISTRABLE DRUG: Performed by: ANESTHESIOLOGY

## 2024-10-09 PROCEDURE — 27200000 HC STERILE SUPPLY: Performed by: ORTHOPAEDIC SURGERY

## 2024-10-09 PROCEDURE — 36415 COLL VENOUS BLD VENIPUNCTURE: CPT | Performed by: ORTHOPAEDIC SURGERY

## 2024-10-09 PROCEDURE — 73501 X-RAY EXAM HIP UNI 1 VIEW: CPT | Mod: LT

## 2024-10-09 PROCEDURE — 25000000 HC PHARMACY GENERAL: Performed by: ORTHOPAEDIC SURGERY

## 2024-10-09 PROCEDURE — C1713 ANCHOR/SCREW BN/BN,TIS/BN: HCPCS | Performed by: ORTHOPAEDIC SURGERY

## 2024-10-09 PROCEDURE — 63600000 HC DRUGS/DETAIL CODE: Mod: JZ

## 2024-10-09 PROCEDURE — 63600000 HC DRUGS/DETAIL CODE: Performed by: ORTHOPAEDIC SURGERY

## 2024-10-09 PROCEDURE — 97116 GAIT TRAINING THERAPY: CPT | Mod: GP

## 2024-10-09 PROCEDURE — 71000001 HC PACU PHASE 1 INITIAL 30MIN: Performed by: ORTHOPAEDIC SURGERY

## 2024-10-09 PROCEDURE — 63600000 HC DRUGS/DETAIL CODE: Mod: JZ | Performed by: ANESTHESIOLOGY

## 2024-10-09 PROCEDURE — 36000015 HC OR LEVEL 5 EA ADDL MIN: Performed by: ORTHOPAEDIC SURGERY

## 2024-10-09 PROCEDURE — 97530 THERAPEUTIC ACTIVITIES: CPT | Mod: GP

## 2024-10-09 PROCEDURE — 25000000 HC PHARMACY GENERAL

## 2024-10-09 PROCEDURE — 25800000 HC PHARMACY IV SOLUTIONS

## 2024-10-09 PROCEDURE — 63700000 HC SELF-ADMINISTRABLE DRUG: Performed by: ORTHOPAEDIC SURGERY

## 2024-10-09 PROCEDURE — 99221 1ST HOSP IP/OBS SF/LOW 40: CPT | Performed by: FAMILY MEDICINE

## 2024-10-09 PROCEDURE — 97162 PT EVAL MOD COMPLEX 30 MIN: CPT | Mod: GP

## 2024-10-09 DEVICE — SCREW 6.5 X 30MM LOW PROF HEX: Type: IMPLANTABLE DEVICE | Site: HIP | Status: FUNCTIONAL

## 2024-10-09 DEVICE — IMPLANTABLE DEVICE: Type: IMPLANTABLE DEVICE | Site: HIP | Status: FUNCTIONAL

## 2024-10-09 DEVICE — ACETABULAR SHELL CLUSTERHOLE 50MM: Type: IMPLANTABLE DEVICE | Site: HIP | Status: FUNCTIONAL

## 2024-10-09 DEVICE — HEAD BIOLOX V40 DELTA CERAMIC 36MM/-5: Type: IMPLANTABLE DEVICE | Site: HIP | Status: FUNCTIONAL

## 2024-10-09 DEVICE — INSERT ZERO DEG 36MM POLYETHYLENE ALPHA CODE D: Type: IMPLANTABLE DEVICE | Site: HIP | Status: FUNCTIONAL

## 2024-10-09 RX ORDER — PANTOPRAZOLE SODIUM 20 MG/1
20 TABLET, DELAYED RELEASE ORAL DAILY
Qty: 30 TABLET | Refills: 0 | Status: SHIPPED | OUTPATIENT
Start: 2024-10-09 | End: 2024-11-08

## 2024-10-09 RX ORDER — DEXTROSE 50 % IN WATER (D50W) INTRAVENOUS SYRINGE
25 AS NEEDED
Status: DISCONTINUED | OUTPATIENT
Start: 2024-10-09 | End: 2024-10-10 | Stop reason: HOSPADM

## 2024-10-09 RX ORDER — ONDANSETRON 4 MG/1
4 TABLET, ORALLY DISINTEGRATING ORAL EVERY 8 HOURS PRN
Status: CANCELLED | OUTPATIENT
Start: 2024-10-09

## 2024-10-09 RX ORDER — DEXAMETHASONE SODIUM PHOSPHATE 4 MG/ML
INJECTION, SOLUTION INTRA-ARTICULAR; INTRALESIONAL; INTRAMUSCULAR; INTRAVENOUS; SOFT TISSUE AS NEEDED
Status: DISCONTINUED | OUTPATIENT
Start: 2024-10-09 | End: 2024-10-09 | Stop reason: SURG

## 2024-10-09 RX ORDER — KETOROLAC TROMETHAMINE 30 MG/ML
INJECTION, SOLUTION INTRAMUSCULAR; INTRAVENOUS AS NEEDED
Status: DISCONTINUED | OUTPATIENT
Start: 2024-10-09 | End: 2024-10-09 | Stop reason: SURG

## 2024-10-09 RX ORDER — METOPROLOL SUCCINATE 50 MG/1
50 TABLET, EXTENDED RELEASE ORAL EVERY EVENING
Status: DISCONTINUED | OUTPATIENT
Start: 2024-10-09 | End: 2024-10-10 | Stop reason: HOSPADM

## 2024-10-09 RX ORDER — GLYCOPYRROLATE 0.6MG/3ML
SYRINGE (ML) INTRAVENOUS AS NEEDED
Status: DISCONTINUED | OUTPATIENT
Start: 2024-10-09 | End: 2024-10-09 | Stop reason: SURG

## 2024-10-09 RX ORDER — AMOXICILLIN 250 MG
1 CAPSULE ORAL 2 TIMES DAILY
Status: DISCONTINUED | OUTPATIENT
Start: 2024-10-09 | End: 2024-10-10 | Stop reason: HOSPADM

## 2024-10-09 RX ORDER — ACETAMINOPHEN 325 MG/1
975 TABLET ORAL ONCE
Status: COMPLETED | OUTPATIENT
Start: 2024-10-09 | End: 2024-10-09

## 2024-10-09 RX ORDER — SODIUM CHLORIDE 0.9 G/100ML
INJECTION, SOLUTION IRRIGATION
Status: DISCONTINUED | OUTPATIENT
Start: 2024-10-09 | End: 2024-10-09 | Stop reason: HOSPADM

## 2024-10-09 RX ORDER — KETAMINE HYDROCHLORIDE 10 MG/ML
INJECTION, SOLUTION INTRAMUSCULAR; INTRAVENOUS AS NEEDED
Status: DISCONTINUED | OUTPATIENT
Start: 2024-10-09 | End: 2024-10-09 | Stop reason: SURG

## 2024-10-09 RX ORDER — ONDANSETRON HYDROCHLORIDE 2 MG/ML
4 INJECTION, SOLUTION INTRAVENOUS EVERY 8 HOURS PRN
Status: CANCELLED | OUTPATIENT
Start: 2024-10-09

## 2024-10-09 RX ORDER — ALBUTEROL SULFATE 90 UG/1
2 INHALANT RESPIRATORY (INHALATION) EVERY 6 HOURS PRN
Status: DISCONTINUED | OUTPATIENT
Start: 2024-10-09 | End: 2024-10-09

## 2024-10-09 RX ORDER — ONDANSETRON HYDROCHLORIDE 2 MG/ML
4 INJECTION, SOLUTION INTRAVENOUS
Status: DISCONTINUED | OUTPATIENT
Start: 2024-10-09 | End: 2024-10-09

## 2024-10-09 RX ORDER — POLYETHYLENE GLYCOL 3350 17 G/17G
17 POWDER, FOR SOLUTION ORAL DAILY
COMMUNITY
Start: 2024-10-09 | End: 2024-10-12

## 2024-10-09 RX ORDER — FENTANYL CITRATE 50 UG/ML
25 INJECTION, SOLUTION INTRAMUSCULAR; INTRAVENOUS EVERY 5 MIN PRN
Status: DISCONTINUED | OUTPATIENT
Start: 2024-10-09 | End: 2024-10-09 | Stop reason: HOSPADM

## 2024-10-09 RX ORDER — PROPOFOL 10 MG/ML
INJECTION, EMULSION INTRAVENOUS AS NEEDED
Status: DISCONTINUED | OUTPATIENT
Start: 2024-10-09 | End: 2024-10-09 | Stop reason: SURG

## 2024-10-09 RX ORDER — FENTANYL CITRATE 50 UG/ML
INJECTION, SOLUTION INTRAMUSCULAR; INTRAVENOUS AS NEEDED
Status: DISCONTINUED | OUTPATIENT
Start: 2024-10-09 | End: 2024-10-09 | Stop reason: SURG

## 2024-10-09 RX ORDER — SENNOSIDES 8.6 MG/1
1 TABLET ORAL 2 TIMES DAILY PRN
Status: DISCONTINUED | OUTPATIENT
Start: 2024-10-09 | End: 2024-10-10 | Stop reason: HOSPADM

## 2024-10-09 RX ORDER — LEVOTHYROXINE SODIUM 100 UG/1
100 TABLET ORAL
Status: DISCONTINUED | OUTPATIENT
Start: 2024-10-10 | End: 2024-10-10 | Stop reason: HOSPADM

## 2024-10-09 RX ORDER — METOCLOPRAMIDE HYDROCHLORIDE 5 MG/ML
INJECTION INTRAMUSCULAR; INTRAVENOUS AS NEEDED
Status: DISCONTINUED | OUTPATIENT
Start: 2024-10-09 | End: 2024-10-09 | Stop reason: SURG

## 2024-10-09 RX ORDER — CELECOXIB 200 MG/1
200 CAPSULE ORAL ONCE
Status: COMPLETED | OUTPATIENT
Start: 2024-10-09 | End: 2024-10-09

## 2024-10-09 RX ORDER — LOSARTAN POTASSIUM 100 MG/1
100 TABLET ORAL DAILY
Status: DISCONTINUED | OUTPATIENT
Start: 2024-10-09 | End: 2024-10-10 | Stop reason: HOSPADM

## 2024-10-09 RX ORDER — FAMOTIDINE 20 MG/1
20 TABLET, FILM COATED ORAL 2 TIMES DAILY
Status: DISCONTINUED | OUTPATIENT
Start: 2024-10-09 | End: 2024-10-10 | Stop reason: HOSPADM

## 2024-10-09 RX ORDER — MIDAZOLAM HYDROCHLORIDE 2 MG/2ML
INJECTION, SOLUTION INTRAMUSCULAR; INTRAVENOUS AS NEEDED
Status: DISCONTINUED | OUTPATIENT
Start: 2024-10-09 | End: 2024-10-09 | Stop reason: SURG

## 2024-10-09 RX ORDER — BUPIVACAINE HYDROCHLORIDE AND EPINEPHRINE 2.5; 5 MG/ML; UG/ML
INJECTION, SOLUTION EPIDURAL; INFILTRATION; INTRACAUDAL; PERINEURAL
Status: DISCONTINUED | OUTPATIENT
Start: 2024-10-09 | End: 2024-10-09 | Stop reason: HOSPADM

## 2024-10-09 RX ORDER — CELECOXIB 200 MG/1
200 CAPSULE ORAL DAILY
Qty: 30 CAPSULE | Refills: 0 | Status: SHIPPED | OUTPATIENT
Start: 2024-10-09 | End: 2024-11-08

## 2024-10-09 RX ORDER — ACETAMINOPHEN 325 MG/1
975 TABLET ORAL
Status: DISCONTINUED | OUTPATIENT
Start: 2024-10-09 | End: 2024-10-10 | Stop reason: HOSPADM

## 2024-10-09 RX ORDER — ALBUTEROL SULFATE 0.83 MG/ML
2.5 SOLUTION RESPIRATORY (INHALATION) EVERY 6 HOURS PRN
Status: DISCONTINUED | OUTPATIENT
Start: 2024-10-09 | End: 2024-10-10 | Stop reason: HOSPADM

## 2024-10-09 RX ORDER — KETOROLAC TROMETHAMINE 15 MG/ML
15 INJECTION, SOLUTION INTRAMUSCULAR; INTRAVENOUS
Status: DISCONTINUED | OUTPATIENT
Start: 2024-10-09 | End: 2024-10-10 | Stop reason: HOSPADM

## 2024-10-09 RX ORDER — HYDROMORPHONE HYDROCHLORIDE 1 MG/ML
0.5 INJECTION, SOLUTION INTRAMUSCULAR; INTRAVENOUS; SUBCUTANEOUS
Status: DISCONTINUED | OUTPATIENT
Start: 2024-10-09 | End: 2024-10-09 | Stop reason: HOSPADM

## 2024-10-09 RX ORDER — EZETIMIBE 10 MG/1
10 TABLET ORAL NIGHTLY
Status: DISCONTINUED | OUTPATIENT
Start: 2024-10-09 | End: 2024-10-10 | Stop reason: HOSPADM

## 2024-10-09 RX ORDER — ALUMINUM HYDROXIDE, MAGNESIUM HYDROXIDE, AND SIMETHICONE 1200; 120; 1200 MG/30ML; MG/30ML; MG/30ML
30 SUSPENSION ORAL EVERY 4 HOURS PRN
Status: DISCONTINUED | OUTPATIENT
Start: 2024-10-09 | End: 2024-10-10 | Stop reason: HOSPADM

## 2024-10-09 RX ORDER — EPHEDRINE SULFATE 50 MG/ML
INJECTION, SOLUTION INTRAVENOUS AS NEEDED
Status: DISCONTINUED | OUTPATIENT
Start: 2024-10-09 | End: 2024-10-09 | Stop reason: SURG

## 2024-10-09 RX ORDER — LIDOCAINE HYDROCHLORIDE 10 MG/ML
INJECTION, SOLUTION INFILTRATION; PERINEURAL AS NEEDED
Status: DISCONTINUED | OUTPATIENT
Start: 2024-10-09 | End: 2024-10-09 | Stop reason: SURG

## 2024-10-09 RX ORDER — ONDANSETRON HYDROCHLORIDE 2 MG/ML
INJECTION, SOLUTION INTRAVENOUS AS NEEDED
Status: DISCONTINUED | OUTPATIENT
Start: 2024-10-09 | End: 2024-10-09 | Stop reason: SURG

## 2024-10-09 RX ORDER — OXYCODONE HYDROCHLORIDE 5 MG/1
5 TABLET ORAL EVERY 4 HOURS PRN
Status: DISCONTINUED | OUTPATIENT
Start: 2024-10-09 | End: 2024-10-10 | Stop reason: HOSPADM

## 2024-10-09 RX ORDER — VANCOMYCIN HYDROCHLORIDE
1250 ONCE
Status: COMPLETED | OUTPATIENT
Start: 2024-10-09 | End: 2024-10-09

## 2024-10-09 RX ORDER — ROCURONIUM BROMIDE 10 MG/ML
INJECTION, SOLUTION INTRAVENOUS AS NEEDED
Status: DISCONTINUED | OUTPATIENT
Start: 2024-10-09 | End: 2024-10-09 | Stop reason: SURG

## 2024-10-09 RX ORDER — BISACODYL 10 MG/1
10 SUPPOSITORY RECTAL DAILY PRN
Status: DISCONTINUED | OUTPATIENT
Start: 2024-10-09 | End: 2024-10-10 | Stop reason: HOSPADM

## 2024-10-09 RX ORDER — SODIUM CHLORIDE 9 MG/ML
INJECTION, SOLUTION INTRAVENOUS CONTINUOUS
Status: ACTIVE | OUTPATIENT
Start: 2024-10-09 | End: 2024-10-10

## 2024-10-09 RX ORDER — TRAMADOL HYDROCHLORIDE 50 MG/1
50 TABLET ORAL EVERY 6 HOURS PRN
Status: DISCONTINUED | OUTPATIENT
Start: 2024-10-09 | End: 2024-10-10 | Stop reason: HOSPADM

## 2024-10-09 RX ORDER — POLYETHYLENE GLYCOL 3350 17 G/17G
17 POWDER, FOR SOLUTION ORAL DAILY
Status: DISCONTINUED | OUTPATIENT
Start: 2024-10-09 | End: 2024-10-10 | Stop reason: HOSPADM

## 2024-10-09 RX ORDER — FAMOTIDINE 10 MG/ML
INJECTION INTRAVENOUS AS NEEDED
Status: DISCONTINUED | OUTPATIENT
Start: 2024-10-09 | End: 2024-10-09 | Stop reason: SURG

## 2024-10-09 RX ORDER — SCOPOLAMINE 1 MG/3D
1 PATCH, EXTENDED RELEASE TRANSDERMAL
Status: DISCONTINUED | OUTPATIENT
Start: 2024-10-09 | End: 2024-10-09 | Stop reason: HOSPADM

## 2024-10-09 RX ORDER — DEXTROSE 40 %
15-30 GEL (GRAM) ORAL AS NEEDED
Status: DISCONTINUED | OUTPATIENT
Start: 2024-10-09 | End: 2024-10-10 | Stop reason: HOSPADM

## 2024-10-09 RX ORDER — DEXAMETHASONE SODIUM PHOSPHATE 4 MG/ML
10 INJECTION, SOLUTION INTRA-ARTICULAR; INTRALESIONAL; INTRAMUSCULAR; INTRAVENOUS; SOFT TISSUE ONCE
Status: COMPLETED | OUTPATIENT
Start: 2024-10-10 | End: 2024-10-10

## 2024-10-09 RX ORDER — PRAVASTATIN SODIUM 20 MG/1
40 TABLET ORAL
Status: DISCONTINUED | OUTPATIENT
Start: 2024-10-09 | End: 2024-10-10 | Stop reason: HOSPADM

## 2024-10-09 RX ORDER — IBUPROFEN 200 MG
16-32 TABLET ORAL AS NEEDED
Status: DISCONTINUED | OUTPATIENT
Start: 2024-10-09 | End: 2024-10-10 | Stop reason: HOSPADM

## 2024-10-09 RX ORDER — AMOXICILLIN 250 MG
1 CAPSULE ORAL 2 TIMES DAILY
COMMUNITY
Start: 2024-10-09 | End: 2024-11-08

## 2024-10-09 RX ORDER — SODIUM CHLORIDE 9 MG/ML
INJECTION, SOLUTION INTRAVENOUS CONTINUOUS PRN
Status: DISCONTINUED | OUTPATIENT
Start: 2024-10-09 | End: 2024-10-09 | Stop reason: SURG

## 2024-10-09 RX ADMIN — TRAMADOL HYDROCHLORIDE 50 MG: 50 TABLET, COATED ORAL at 21:16

## 2024-10-09 RX ADMIN — Medication 10 MG: at 13:39

## 2024-10-09 RX ADMIN — ACETAMINOPHEN 325MG 975 MG: 325 TABLET ORAL at 16:07

## 2024-10-09 RX ADMIN — FAMOTIDINE 20 MG: 20 TABLET, FILM COATED ORAL at 21:12

## 2024-10-09 RX ADMIN — METOCLOPRAMIDE HYDROCHLORIDE 10 MG: 10 INJECTION, SOLUTION INTRAMUSCULAR; INTRAVENOUS at 11:07

## 2024-10-09 RX ADMIN — FAMOTIDINE 20 MG: 10 INJECTION INTRAVENOUS at 11:07

## 2024-10-09 RX ADMIN — SENNOSIDES AND DOCUSATE SODIUM 1 TABLET: 8.6; 5 TABLET ORAL at 21:12

## 2024-10-09 RX ADMIN — PROPOFOL 200 MG: 10 INJECTION, EMULSION INTRAVENOUS at 10:56

## 2024-10-09 RX ADMIN — CEFAZOLIN 2 G: 2 INJECTION, POWDER, FOR SOLUTION INTRAMUSCULAR; INTRAVENOUS at 18:43

## 2024-10-09 RX ADMIN — SODIUM CHLORIDE: 9 INJECTION, SOLUTION INTRAVENOUS at 15:59

## 2024-10-09 RX ADMIN — KETOROLAC TROMETHAMINE 7.5 MG: 30 INJECTION, SOLUTION INTRAMUSCULAR; INTRAVENOUS at 13:23

## 2024-10-09 RX ADMIN — PRAVASTATIN SODIUM 40 MG: 20 TABLET ORAL at 18:43

## 2024-10-09 RX ADMIN — TRAMADOL HYDROCHLORIDE 50 MG: 50 TABLET, COATED ORAL at 16:00

## 2024-10-09 RX ADMIN — SUGAMMADEX 200 MG: 100 INJECTION, SOLUTION INTRAVENOUS at 13:32

## 2024-10-09 RX ADMIN — ACETAMINOPHEN 325MG 975 MG: 325 TABLET ORAL at 09:14

## 2024-10-09 RX ADMIN — CEFAZOLIN 2 G: 2 INJECTION, POWDER, FOR SOLUTION INTRAMUSCULAR; INTRAVENOUS at 11:07

## 2024-10-09 RX ADMIN — EPHEDRINE SULFATE 10 MG: 50 INJECTION, SOLUTION INTRAVENOUS at 11:24

## 2024-10-09 RX ADMIN — DEXAMETHASONE SODIUM PHOSPHATE 6 MG: 4 INJECTION, SOLUTION INTRA-ARTICULAR; INTRALESIONAL; INTRAMUSCULAR; INTRAVENOUS; SOFT TISSUE at 11:07

## 2024-10-09 RX ADMIN — VANCOMYCIN HYDROCHLORIDE 1250 MG: 500 INJECTION, POWDER, LYOPHILIZED, FOR SOLUTION INTRAVENOUS at 09:33

## 2024-10-09 RX ADMIN — FENTANYL CITRATE 100 MCG: 50 INJECTION, SOLUTION INTRAMUSCULAR; INTRAVENOUS at 10:56

## 2024-10-09 RX ADMIN — EPHEDRINE SULFATE 5 MG: 50 INJECTION, SOLUTION INTRAVENOUS at 11:47

## 2024-10-09 RX ADMIN — ROCURONIUM BROMIDE 50 MG: 10 INJECTION INTRAVENOUS at 10:56

## 2024-10-09 RX ADMIN — TRANEXAMIC ACID 1500 MG: 100 INJECTION, SOLUTION INTRAVENOUS at 11:07

## 2024-10-09 RX ADMIN — METOPROLOL SUCCINATE 50 MG: 50 TABLET, EXTENDED RELEASE ORAL at 18:42

## 2024-10-09 RX ADMIN — EZETIMIBE 10 MG: 10 TABLET ORAL at 21:12

## 2024-10-09 RX ADMIN — SCOPOLAMINE 1 PATCH: 1.5 PATCH, EXTENDED RELEASE TRANSDERMAL at 10:22

## 2024-10-09 RX ADMIN — FENTANYL CITRATE 50 MCG: 50 INJECTION, SOLUTION INTRAMUSCULAR; INTRAVENOUS at 11:54

## 2024-10-09 RX ADMIN — FENTANYL CITRATE 25 MCG: 50 INJECTION INTRAMUSCULAR; INTRAVENOUS at 13:52

## 2024-10-09 RX ADMIN — PROPOFOL 20 MCG/KG/MIN: 10 INJECTION, EMULSION INTRAVENOUS at 11:01

## 2024-10-09 RX ADMIN — POLYETHYLENE GLYCOL 3350 17 G: 17 POWDER, FOR SOLUTION ORAL at 15:59

## 2024-10-09 RX ADMIN — ROCURONIUM BROMIDE 10 MG: 10 INJECTION INTRAVENOUS at 12:56

## 2024-10-09 RX ADMIN — FENTANYL CITRATE 25 MCG: 50 INJECTION INTRAMUSCULAR; INTRAVENOUS at 14:15

## 2024-10-09 RX ADMIN — Medication 20 MG: at 11:26

## 2024-10-09 RX ADMIN — LIDOCAINE HYDROCHLORIDE 5 ML: 10 INJECTION, SOLUTION INFILTRATION; PERINEURAL at 10:56

## 2024-10-09 RX ADMIN — FENTANYL CITRATE 50 MCG: 50 INJECTION, SOLUTION INTRAMUSCULAR; INTRAVENOUS at 13:35

## 2024-10-09 RX ADMIN — KETOROLAC TROMETHAMINE 15 MG: 15 INJECTION, SOLUTION INTRAMUSCULAR; INTRAVENOUS at 18:42

## 2024-10-09 RX ADMIN — MIDAZOLAM 2 MG: 1 INJECTION INTRAMUSCULAR; INTRAVENOUS at 10:51

## 2024-10-09 RX ADMIN — ONDANSETRON 4 MG: 2 INJECTION INTRAMUSCULAR; INTRAVENOUS at 13:23

## 2024-10-09 RX ADMIN — LOSARTAN POTASSIUM 100 MG: 100 TABLET, FILM COATED ORAL at 15:58

## 2024-10-09 RX ADMIN — CELECOXIB 200 MG: 200 CAPSULE ORAL at 09:14

## 2024-10-09 RX ADMIN — ROCURONIUM BROMIDE 20 MG: 10 INJECTION INTRAVENOUS at 11:58

## 2024-10-09 RX ADMIN — EPHEDRINE SULFATE 10 MG: 50 INJECTION, SOLUTION INTRAVENOUS at 11:16

## 2024-10-09 RX ADMIN — SODIUM CHLORIDE: 9 INJECTION, SOLUTION INTRAVENOUS at 10:51

## 2024-10-09 RX ADMIN — EPHEDRINE SULFATE 10 MG: 50 INJECTION, SOLUTION INTRAVENOUS at 11:01

## 2024-10-09 RX ADMIN — PREGABALIN 75 MG: 50 CAPSULE ORAL at 21:12

## 2024-10-09 RX ADMIN — GLYCOPYRROLATE 0.2 MG: 0.2 INJECTION, SOLUTION INTRAMUSCULAR; INTRAVITREAL at 11:07

## 2024-10-09 ASSESSMENT — COGNITIVE AND FUNCTIONAL STATUS - GENERAL
STANDING UP FROM CHAIR USING ARMS: 3 - A LITTLE
CLIMB 3 TO 5 STEPS WITH RAILING: 3 - A LITTLE
AFFECT: WNL
STANDING UP FROM CHAIR USING ARMS: 3 - A LITTLE
STANDING UP FROM CHAIR USING ARMS: 3 - A LITTLE
WALKING IN HOSPITAL ROOM: 3 - A LITTLE
MOVING TO AND FROM BED TO CHAIR: 3 - A LITTLE
WALKING IN HOSPITAL ROOM: 3 - A LITTLE
WALKING IN HOSPITAL ROOM: 3 - A LITTLE
MOVING TO AND FROM BED TO CHAIR: 3 - A LITTLE
MOVING TO AND FROM BED TO CHAIR: 3 - A LITTLE
CLIMB 3 TO 5 STEPS WITH RAILING: 3 - A LITTLE
CLIMB 3 TO 5 STEPS WITH RAILING: 3 - A LITTLE

## 2024-10-09 ASSESSMENT — ENCOUNTER SYMPTOMS: DYSRHYTHMIAS: 1

## 2024-10-09 NOTE — PLAN OF CARE
Problem: Hip Arthroplasty  Goal: Optimal Coping  Outcome: Progressing  Goal: Absence of Bleeding  Outcome: Progressing  Goal: Effective Bowel Elimination  Outcome: Progressing  Goal: Fluid and Electrolyte Balance  Outcome: Progressing  Goal: Optimal Functional Ability  Outcome: Progressing  Goal: Absence of Infection Signs and Symptoms  Outcome: Progressing  Goal: Intact Neurovascular Status  Outcome: Progressing  Goal: Anesthesia/Sedation Recovery  Outcome: Progressing  Goal: Acceptable Pain Control  Outcome: Progressing  Goal: Nausea and Vomiting Relief  Outcome: Progressing  Goal: Effective Urinary Elimination  Outcome: Progressing  Goal: Effective Oxygenation and Ventilation  Outcome: Progressing

## 2024-10-09 NOTE — ANESTHESIA PREPROCEDURE EVALUATION
Relevant Problems   CARDIOVASCULAR   (+) Benign essential HTN   (+) Coronary artery disease involving native coronary artery of native heart without angina pectoris   (+) PAF (paroxysmal atrial fibrillation) (CMS/HCC)      MUSCULOSKELETAL   (+) Primary osteoarthritis of left hip      RESPIRATORY SYSTEM   (+) Mild intermittent asthma without complication       Anesthesia ROS/MED HX    Anesthesia History    History of anesthetic complications  - PONV  Pulmonary    asthma  Cardiovascular   CAD   Cardiac stents   hypertension  Dysrhythmias and atrial fibrillation   Cardiac clearance reviewed, echocardiogram reviewed and ECG reviewed   Normal ECG  Hematological    anticoagulants  GI/Hepatic- neg  Musculoskeletal- neg  Renal Disease- neg  Endo/Other  Body Habitus: Normal  ROS/MED HX Comments:    ROS/Hx: Medical and cardiac clearances noted.       Past Surgical History   Procedure Laterality Date    Abdominoplasty      Cardiac catheterization       section      x 3    Cholecystectomy      Colonoscopy      Coronary angioplasty with stent placement      Hysterectomy      Knee arthroscopy      Lumbar laminectomy      Tonsillectomy      Upper gastrointestinal endoscopy         Physical Exam    Airway   Mallampati: III   TM distance: <3 FB   Neck ROM: full  Cardiovascular - normal   Rhythm: regular   Rate: normalDental - normal        Anesthesia Plan    Plan: general       3 ASA

## 2024-10-09 NOTE — PROGRESS NOTES
Physical Therapy -  Initial Evaluation     Patient: Nidia Hopkins  Location: [facility] 4B 3695  MRN: [id number]  Today's date: 10/9/2024    HISTORY OF PRESENT ILLNESS     Norman is a 64 y.o. female admitted on 10/9/2024 with Osteoarthritis of left hip, unspecified osteoarthritis type [M16.12]  OA (osteoarthritis) of hip [M16.9]. Principal problem is No Principal Problem: There is no principal problem currently on the Problem List. Please update the Problem List and refresh..    Past Medical History  Norman has a past medical history of Arthritis, Asthma, Atrial fibrillation, unspecified type (CMS/HCC), CAD (coronary artery disease), Exocrine pancreatic insufficiency, Hypertension, IBS (irritable bowel syndrome), Long Q-T syndrome, Mild exercise-induced asthma, and Post herpetic neuralgia.    History of Present Illness   s/p L KARYN with Dr. Zuñiga 10/9. 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 walks 3 miles daily with    Assistive Device Currently Used at Home none        History of Falls: No falls in the past year    Prior Living Environment      Flowsheet Row Most Recent Value   People in Home spouse   Current Living Arrangements home   Living Environment Comment lives with  in 2SH, 2STE (-) rail, MI on 1st, B/B on 2nd with [facility] (-) DME.          VITALS AND PAIN     PT Vitals      Date/Time Pulse HR Source SpO2 Pt Activity O2 Therapy BP BP Location BP Method Pt Position [facility]   10/09/24 1624 70 Monitor 100 % At rest None (Room air) 125/76  Right upper arm Automatic Sitting ALW   10/09/24 1655 68 Monitor 96 % At rest None (Room air) 131/81 Right upper arm Automatic Sitting ALW          PT Pain      Date/Time Pain Type Side/Orientation Location Rating: Rest Rating: Activity Description [facility]   10/09/24 1624 Pain Assessment left hip 2 - mild pain 2 - mild pain incisional ALW             Objective   OBJECTIVE     Start time:  1624  End time:  1658  Session Length: 34 min       General Observations  Patient received in chair, leg(s) elevated, reclined. She was agreeable to therapy, no issues or concerns identified by nurse prior to session.  present for session, pt eating dinner    Precautions: weight bearing, hip  Hip Precautions: no precautions needed (as per chart. however as per pt, RN has been reinforcing posterior precautions. ortho team secure chatted, posterior precautions used during session until clarified)  Extremity Weight-Bearing Status: left lower extremity  Left LE Weight-Bearing Status: weight-bearing as tolerated (WBAT)           PT Eval and Treat - 10/09/24 1624          Cognition    Orientation Status oriented x 4     Affect/Mental Status WNL     Follows Commands WNL     Cognitive Function WNL     Comment, Cognition very pleasant during session. cooperative with treatment, receptive to education        Vision Assessment/Intervention    Vision Assessment corrective lenses for reading        Hearing Assessment    Hearing Status WFL        Sensory Assessment    Sensory Assessment sensation intact, lower extremities        Lower Extremity Assessment    LE Assessment ROM and Strength WFL except     General Observations L hip ROM limited by pain        Bed Mobility    Comment OOB during session        Mobility Belt    Mobility Belt Used During Session yes        Sit/Stand Transfer    Surface chair with arm rests     [place] close supervision     Safety/Cues hand placement;technique;sequencing;proper use of assistive device      Assistive Device walker, front-wheeled     Transfer Comments performs swiftly with good use of UEs for pushoff. denies LH/dizziness upon standing. good eccentirc control back to recliner at end of session        Gait Training    [place], Gait supervision     Safety/Cues technique;sequencing;proper use of assistive device     Assistive Device walker, front-wheeled     Distance in Feet 100 feet     Pattern step-through;step-to     Comment initially started with step to pattern but progresses to step through with good use of RW with minimal cuing. limited by pain. denies LH/dizziness. significant RLE trendelenberg and trunk lean with stance.        Balance    Static Sitting Balance WFL;sitting in chair     Dynamic Sitting Balance WFL;sitting in chair     Sit to Stand Dynamic Balance WFL;supported     Static Standing Balance supported;WFL     Dynamic Standing Balance mild impairment;supported     Comment, Balance with RW        Lower Extremity (Therapeutic Exercise)    Comment educated on continuing with seated/supine TE to  prevent blood clots/deconditioning        Impairments/Safety Issues    Impairments Affecting Function balance;endurance/activity tolerance;pain;range of motion (ROM);strength     Functional Endurance fair, limited by pain                                    Education Documentation  Treatment Plan, taught by Raiza Ruiz PT at 10/9/2024  5:14 PM.  Learner: Significant Other, Patient  Readiness: Acceptance  Method: Explanation, Demonstration  Response: Verbalizes Understanding, Demonstrated Understanding  Comment: PT role and goals, indications for OP PT, use of RW, safety with mobility, techniques        Session Outcome  Patient reclined, in chair, leg(s) elevated at end of session, chair alarm on, call light in reach, personal items in reach, all needs met. Nursing notified about patient's performance and patient's position. (precautions ordered, LLE leg length)    AM-PAC™ - Mobility  (Current Function)     Turning form your back to your side while in flat bed without using bedrails 3 - A Little   Moving from lying on your back to sitting on the side of a flat bed without using bedrails 3 - A Little   Moving to and from a bed to a chair 3 - A Little   Standing up from a chair using your arms 3 - A Little   To walk in a hospital room 3 - A Little   Climbing 3-5 steps with a railing 3 - A Little   AM-PAC™ Mobility Score 18      ASSESSMENT AND PLAN     Progress Summary  PT eval complete. Pt is cl-S for STS, S for household distance ambulation. Limited at this time by pain, post op weakness and deconditioning. Good use of RW with minimal cuing. No precautions ordered in chart however RN inforcing to pt, reached out to ortho team for clarification however maintained posterior precautions during session for safety. Pt also reporting LLE feels significantly longer than RLE, RN aware. Rec home with A from  when cleared for d/c and OP PT to prevent secondary complications 2/2 abnormal gait pattern and return to active lifestyle when appropriate    Patient/Family Therapy Goals Statement: to be able to walk and bike again    PT Plan      Flowsheet Row Most Recent Value   Rehab Potential good, to achieve stated therapy goals at 10/09/2024 1624   Therapy Frequency daily at 10/09/2024 1624   Planned Therapy Interventions balance training, bed mobility training, gait training, home exercise program, patient/family education, stair training, strengthening, transfer training at 10/09/2024 1624            PT Discharge Recommendations      Flowsheet Row Most Recent Value   PT Recommended Discharge Disposition home with assistance, home with outpatient services at 10/09/2024 1624   Anticipated Equipment Needs if Discharged Home (PT) walker, front-wheeled at 10/09/2024 1624                 PT Goals      Flowsheet Row Most Recent Value   Bed Mobility Goal 1    Activity/Assistive Device bed mobility activities,  all at 10/09/2024 1624   [place] independent at 10/09/2024 1624   Time Frame by discharge at 10/09/2024 1624   Progress/Outcome goal ongoing at 10/09/2024 1624   Transfer Goal 1    Activity/Assistive Device sit-to-stand/stand-to-sit, walker, front-wheeled at 10/09/2024 1624   [place] modified independence at 10/09/2024 1624   Time Frame by discharge at 10/09/2024 1624   Progress/Outcome goal ongoing at 10/09/2024 1624   Gait Training Goal 1    Activity/Assistive Device gait (walking locomotion), walker, front-wheeled at 10/09/2024 1624   [place] modified independence at 10/09/2024 1624   Distance 200 at 10/09/2024 1624   Time Frame by discharge at 10/09/2024 1624   Progress/Outcome goal ongoing at 10/09/2024 1624   Stairs Goal 1    Activity/Assistive Device stairs, all skills, walker, front-wheeled at 10/09/2024 1624   [place] supervision required at 10/09/2024 1624   Number of Stairs 2 at 10/09/2024 1624   Time Frame by discharge at 10/09/2024 1624   Progress/Outcome goal ongoing at 10/09/2024 1624

## 2024-10-09 NOTE — ANESTHESIA PROCEDURE NOTES
Airway  Urgency: elective    Start Time: 10/9/2024 10:58 AM  Airway not difficult    General Information and Staff    Patient location during procedure: OR  Anesthesiologist: Akil Noriega MD  Resident/CRNA: Emily Davis CRNA  Performed: resident/CRNA   Performed by: Emily Davis CRNA  Authorized by: Emily Davis CRNA      Indications and Patient Condition  Indications for airway management: anesthesia  Sedation level: general  Preoxygenated: yes  Patient position: sniffing  Mask difficulty assessment: 1 - vent by mask    Final Airway Details  Final airway type: endotracheal airway      Successful airway: ETT  Cuffed: yes   Successful intubation technique: direct laryngoscopy  Facilitating devices/methods: intubating stylet  Endotracheal tube insertion site: oral  Blade: Nakita  Blade size: #3  ETT size (mm): 7.0  Cormack-Lehane Classification: grade I - full view of glottis  Placement verified by: chest auscultation and capnometry   Cuff volume (mL): 8  Measured from: lips  ETT to lips (cm): 22  Number of attempts at approach: 1  Number of other approaches attempted: 0  Atraumatic airway insertion

## 2024-10-09 NOTE — ANESTHESIOLOGIST PRE-PROCEDURE ATTESTATION
Pre-Procedure Patient Identification:  I am the Primary Anesthesiologist and have identified the patient on 10/09/24 at 10:20 AM.   I have confirmed the procedure(s) will be performed by the following surgeon/proceduralist Sizer, Sincere CAN DO.

## 2024-10-09 NOTE — OR SURGEON
Pre-Procedure patient identification:  I am the primary operating surgeon/proceduralist and I have reviewed the applicable pathology reports and radiology studies for this procedure. I have identified the patient and confirmed laterality is left hip on 10/09/24 at 9:55 AM Sincere Zuñiga DO  Phone Number: [phone]

## 2024-10-09 NOTE — PROGRESS NOTES
Orthopaedic Progress Note    S: Patient seen and examined at bedside. No acute events intraoperatively. Patient tolerated the procedure well. Groggy, recovering from anesthesia. Pain well controlled at this time.      O:  NAD  AVNSS  Vitals:    10/09/24 1415   BP: 131/70   Pulse: 62   Resp: 18   Temp:    SpO2: 100%       LLE  Dressing c/d/i  Compartments soft and compressible  SILT superficial peroneal, deep peroneal, tibial, saphenous, sural  Motor intact DF/PF/EHL/FHL/peroneals  Foot and toes warm and well perfused, DP 2+    A/P:  64 y.o. female s/p L KARYN with Dr. Zuñiga 10/9    Multimodal pain control  PT/OT  DVT ppx - xarelto, to be resumed 10/10 evening  Postop ancef x24h  WBAT LLE  Incentive spirometer  Dispo planning    Cheko Acosta, DO  Orthopedic Surgery  Please page [phone] with questions or concerns

## 2024-10-09 NOTE — DISCHARGE INSTRUCTIONS
POST-OPERATIVE (AFTER SURGERY INSTRUCTIONS) -HIP REPLACEMENT  Surgeon:  Dr. Sincere Zuñiga  Phone/Voicemail: [phone] 8:00am-4:00pm Monday-Friday  [facility] Main Phone: [phone]   Call [phone] for patient emergencies during or after business hours     Congratulations!  You’ve made a big step!  Our focus now is on your recovery and getting you back to a healthy, happy, pain-free lifestyle.  Every day, you will find you can do a bit more.  You are literally teaching yourself to walk normally again! Here are answers to some common questions:    1. ACTIVITY/REHABILITATION/PHYSICALTHERAPY (PT)  There is a great deal of healing that will take place during the next several months of your recovery as your bone literally grows into the new implant and your muscles heal.  Please do not overdo it!  Let your hip heal and it will likely give you a lifetime of pain relief. Your focus should simply be on walking and doing your normal activities of daily living (ADL).  You use all the muscles around your hip when you walk and when you’re up doing things.  You do not need to set records, simply walk as much as you feel comfortable every day.  It is normal to have discomfort, but if you are having intense pain, stop the activity and rest.  You’ll notice just about every day that you are feeling stronger and can walk further with less assistance. Don’t be concerned about specific exercises, JUST WALK!!  The plan is for you to go home directly from the hospital (there may be an exception, but it is rare).  Most people will be able to go home in 1 day after surgery.  The timing of your discharge will be determined by how well you do with the hospital physical therapy.  We want to make sure that you are safe to go home.  If you can move around the hospital without assistance, it’s unlikely that you’ll need extra assistance when you get home.  Home care services; such as physical therapy and nursing are rarely  required, even if you live alone. Remember, we simply want you to be able to safely get around your home and WALK!  Inpatient rehabilitation is almost never required in this day and age. In the unusual situation that it is necessary, it will be arranged by the hospital .  Even at inpatient rehab, we just want you to focus on walking. The vast majority of patients will NOT even need any formal outpatient PT!  However, if you are having any problems at home, please call us and we can discuss the option of a more formal PT program. We’ve learned that patients “taking it easy” for the first couple of weeks after surgery may have better outcomes. Doing intense PT right after surgery can cause pain and swelling.  If you avoid overdoing it, you will have less pain and swelling.    Every day you will feel stronger and more stable. You will use a walker at first and you should plan to advance to a cane sometime within a couple weeks after surgery - you can do this whenever you feel comfortable. Using the cane helps your muscles heal faster, so use the cane for about a month. Stay active, but don’t overdo it - we want the tissues and bone to heal well before we start stressing the hip.  You can resume upper body exercises or start using a stationary bike (but not necessary!) after 2 weeks.  Remember: NO FORMAL Physical Therapy…Just WALK!  2. SWELLING   It is NORMAL for your leg to swell significantly after surgery.  If you’re having a lot of swelling, you must spend more time elevating your leg well above your heart (see image below).  You may need to elevate your leg on 4 or 5 pillows for 30-60 minutes at a time, 5 times a day.  If your leg swelling is not improving over time then please call our office for further instruction. Some swelling can persist for weeks to months after the procedure.    This is How to Elevate Your Legs above Heart Level:        To Reduce Swelling and pain  Do this 30-60 mins at a  time!  Do this 4 to 5 times a day!                              3. MEDICINES  You will be given prescriptions at the time of discharge from the hospital.   Please inform hospital and office staff of any medication allergies.   If you are not tolerating medication well in the hospital prior to discharge then please let your hospital nurse know that you are experiencing side effects.   Your nurse will review your medication with you prior to your discharge from the hospital.    Pain Medicine: The key medications for decreased narcotic use will be Tylenol (acetaminophen) and an anti-inflammatory such as Meloxicam or Celebrex.   Taking these medications on a regular basis as ordered, for the first 2 weeks after surgery will help your recovery tremendously.  *If not medically contraindicated you will take 1000mg of Tylenol every 8 hours.* Most patients will be given a prescription for Oxycodone, Hydrocodone, or Tramadol.  Take narcotic/opioid medication as needed, but wean off of it as soon as you can. Narcotic addiction can begin after only 3 days of taking the medication.  You should notice that you need less narcotic/opioid pain medicine as time passes. It is important, however, to have adequate pain control so that you can participate in PT and perform your stretches - proper pain control will make your recovery easier and faster!   Do NOT drive while you are taking narcotic pain medicines.  Stop taking the pain medicine if you become dizzy or disoriented.  Constipation is a major side effect of narcotics and many patients will avoid narcotics, as they prefer to deal with a bit of pain rather than constipation.  If you stop taking the pain medicine, you can typically stop taking the stool softener. Gradually weaning off the narcotic/opioid pain medication is safer than abruptly stopping them.     Constipation Management/Stool Softener: (usually Colace).  This helps to avoid constipation caused by the other  medications. Take the stool softener in the morning and in the evening daily for 2-4 weeks, or as long as you are taking the narcotics. It is fine to take over-the-counter laxatives in addition to Colace for constipation management.  If you are not constipated or you experience diarrhea, stop taking the stool softener!    Blood Thinners: Aspirin, Coumadin (Warfarin), Xarelto, Eliquis or Lovenox (Enoxaparin) - ONLY ONE - you should not be on more than one of these medicines.  These medicines will help to reduce the risk of blood clots.  However, if your blood becomes too “thin”, you may see bleeding (at the surgical site, gums, in urine, rectum, etc.), severe bruising, or stomach upset.  Please call the office if this occurs.  Do not take vitamin K, vitamin E, or supplements until seen in the office.  It is fine to take a multivitamin. Please remember that the most important way to reduce the risk of clot formation is to get up and get going!        ASPIRIN/ECOTRIN (nearly all patients):   If you are discharged on Aspirin 81mg, take it once in the morning with breakfast and once in the evening with dinner for 4 weeks after your surgery.       COUMADIN (Warfarin):  If you are taking Coumadin, you must get a blood test (INR) drawn 2x/week (you will have a prescription for this).  Your dose will be adjusted based on the lab values.  Stay on Coumadin for 4 weeks.  If you were taking Coumadin before surgery you may resume your normal dose 4 weeks after your surgery.  Typically the cardiologist or your medical doctor will follow your labs and adjust the Coumadin dose.  If you do not receive instruction from them or cannot get in touch with them, then please call our office.  3. MEDICINES (Blood Thinners Continued)    LOVENOX (Enoxaparin):    This is an injection you will give to yourself for 14 days after surgery.  NOTE: If you notice any excessive bruising or bleeding from your surgical wound (or elsewhere), call our  office.    Other Blood Thinners:  If you are taking other types of blood thinners such as Xarelto, Eliquis, Plavix, Aggrenox, Arixtra, Heparin, etc. please confirm your specific dosing and when to resume these medicines prior to your discharge.  Please DO NOT leave the hospital without a clear understanding of the instructions regarding blood thinners!    DO NOT TAKE ANTI-INFLAMMATORY MEDICATIONS SUCH AS ADVIL, ALEVE, MOTRIN AND IBUPROFEN WHILE TAKING THE ABOVE BLOOD THINNING MEDICATIONS!!!  THIS CAN CAUSE DAMAGE TO YOUR STOMACH OR LEAD TO EXCESSIVE BLEEDING.    4. SURGICAL DRESSING/INCISION  If your incision is covered with a Mepilex bandage, this can stay in place from the date of surgery for 7 days total.  Your incision can remain open to the air after removing. If you have any blood oozing from the incision then place a gauze pad and an adhesive bandage over the surgical site to apply some compression. Once removed, simply let the water of the shower and soap run over the incision daily and leave the incision open to air. Do not scrub the incision. Do not apply any lotions, creams, or ointments to your incision until fully closed and healed (4-6 weeks). Do not soak in a tub or swim until incision fully closed and healed (4-6 weeks). There are typically not any staples or sutures that will need to be removed from your incision at 2 weeks post op.  There are deep sutures under the skin that will dissolve over time.      5. SLEEPING   It is NORMAL to have difficulty sleeping after orthopaedic surgery.  It may take several months until you are sleeping normally. Elevating your leg throughout the day will reduce swelling that builds up at night - this can help you get a better night’s rest. We generally advise against taking sleeping medication postoperatively, unless you were doing so prior to the surgery.    6. SLEEPING POSITION   There are no restrictions regarding position.  When lying on your side, you may place  a pillow between your knees for comfort, if necessary.     7. HIP PRECAUTIONS   “Listen” to your hip - don’t push beyond a pulling or painful sensation.  Keep your body (trunk) between your legs when you bend. Be careful not to bend past 90 degrees.  Move smoothly during the first 4 -6 weeks after surgery.  Avoid any major twisting at the waist. Don’t cross your legs at the knees for the first month after surgery. After one month, the chance of hip dislocation with activity is lower.            8. DRIVING  You must have advanced to a cane, be off narcotics, and feel comfortable and safe to drive! If it was your left hip, you may be ready after 3 weeks.  For the right hip, it usually takes longer; you may be ready as early as 4 weeks. You should drive only if you feel safe! Practice first in empty parking lot. If in doubt, call us.     9. INFECTION PRECAUTIONS  You will need to take an antibiotic approximately one hour before any dental appointments. You will be given a prescription at your four month post-op appointment. Current recommendations are to continue to do this forever after your surgery. We recommend waiting 4 months from the date of your surgery before having any routine dental work or prior to scheduling any elective invasive procedures.    10. FLYING/TRIPS    By 6 weeks after surgery, you should be able to take just about any trip. Prior to that, it is likely that it will be uncomfortable, especially for longer trips, so we advise you to plan your travel accordingly.  For patients who come from far away, you can typically fly home within the first week. During any long trip (plane, train, auto), we suggest that you to take time to get up, stretch your legs, and walk around. Take one Aspirin 81mg tablet on the morning and one tablet on the evening of your trip (if not on other blood thinners). Special cards stating that you have a hip replacement are no longer provided, as the [facility] no longer accepts  them.  Plan to spend some extra time at airport security in case you set off the alarm when you pass through the metal detector!    11. DISABILITY FORMS  Please contact my nurse if you have any disability, work, or other forms that need to be completed. Forms can be processed via the  at our [facility] offices. Form processing requires a signed release of information form on file and form payment.    12. POST-OP APPOINTMENT: call [phone] if you do not have one scheduled.  Your appointment was made when you scheduled surgery. Routine post op visits are around 2 weeks, then 4 weeks and then 4 months post op.      13. RETURN TO WORK   I expect my patients to return to work within 3 months from surgery.  Most patients return around 4-6 weeks post op. You can return when you feel ready. You can return with restrictions (if your job allows you to do so).  Call my nurse if you need a note.                           Dr. Zuñiga Team Tips:    Call my Nurse if you have questions or concerns when you are home.  Please call at any time if you experience worsening pain, new numbness/tingling or weakness, wound redness or drainage, bleeding, loss of motion, fevers (temp above 101 F), or chills.  Never hesitate to call if you have a concern.  Emily Burks, clinical assistant: [phone] for Non-Emergent needs: (Mon-Fri 8:00am to 4:00pm)  [facility] Main Phone #: [phone] for Emergency needs during or after business hours     Pain medication refills- Call my nurse at least 3 business days in advance of running out of medication. Many narcotics/opioids can’t be called into or faxed to your pharmacy. Patients may need to make arrangements to  a prescription in an office (where my nurse is) or have it mailed to their home. We do not refill pain medication after 3 months from your surgery date. To prevent narcotic dependency, we recommend you start decreasing the amount of narcotic medication you  are taking as soon as possible after surgery and rely on just Tylenol or NSAID’s for pain management. I expect most patients to be completely off narcotics/opioids by their 1 month follow up visit.    It’s normal to have an elevated temperature or low grade fever that may persist for 6 to 8 weeks after surgery. Call if 101 degrees or higher.    It’s normal for the hip area to feel warm and can persist for up to 6 months.    It’s normal to feel numbness around the incisional area (may resolve in 6 months to 1 year, but may be permanent). It’s normal to feel a burning pain around the hip or near the incision (this will calm down as the swelling and inflammation lessens in your leg).    You might find it hard to raise or lift your leg on your own right after surgery. It might take up to 4 weeks before you regain full strength. The exercises you do daily will help strengthen the leg.    It can be normal for some redness/pinkness to appear around the incision. Angry or bright redness should be a concern and call my nurse if this occurs.    It is normal to have bruising up and down the operative leg and may take a long time to go away.    It’s good to apply ice for about 20 minutes 3-4 times a day (not directly on your skin though) to help with pain and swelling.    It’s normal to hear or feel clicking in the hip (which may go away as inflammation goes down).    If you have increased swelling, you are likely overdoing it.    It might take a few weeks to “turn the corner” after surgery. Try to stay positive during this time. You will be able to return to your normal activities at a gradual pace in due time.    ENJOY YOUR NEW HIP!!    INFECTION PRECAUTIONS FOR PATIENTS WITH TOTAL JOINT REPLACEMENTS  Follow these infection precautions to reduce the chance of infection to your joint.  These instructions are written for your dentist and treating physician to follow.  An untreated infection elsewhere in your body may spread to  your joint replacement.   PROCEDURE RECOMMENDED ANTIBOTIC   Teeth cleaning, scaling, dental extractions and root canal, teeth filling, capping and bridge work.     *PLEASE WAIT 4 MONTHS FROM DATE OF SURGERY FOR ELECTIVE DENTAL PROCEDURES AND ELECTIVE PROCEDURES OF ANY KIND*   Amoxicillin 500mg, take 4 capsules ONE HOUR PRIOR to procedure  or  Cleocin (Clindamycin) 150mg cap take 4 capsules ONE HOUR PRIOR to procedure (Use if Penicillin allergic)  Alternatives:   Azithromycin 500mg, take 1 cap 1 hr. prior to procedure  Doxycycline 100 mg, take 1 cap 1 hr. prior to procedure  Erythromycin 800mg, 1 hr. prior to procedure   Skin boils and infected skin lesions Cleocin 150mg capsules. Take 1 capsule every six hours until lesion is healed. See PCP or dermatologist.   Foot procedures (especially ingrown nails) but NOT for nail cutting or pedicures. Cleocin 150 mg capsules. Take 4 capsule ONE HOUR PRIOR to procedure, Alternative Dicloxacillin 1 gm., Keflex 2 gm. or Erythromycin 800 mg   Genitourinary tract or Gastrointestinal tract procedures: colonoscopy, endoscopy, sigmoidoscopy, or cystoscopy INFORM YOUR DOCTOR THAT YOU HAVE A JOINT REPLACEMENT. NO ANTIBOTIC NEEDED UNLESS AN INFECTION IS PRESENT   Breast biopsy, Pap smear NO ANTIBOTIC NEEDED UNLESS INFECTION IS PRESENT   Surgery  Inform the surgeon performing your procedure that you have a joint replacement   Cardiac Catheterization Inform your cardiologist that you have a joint replacement.    *NOTE: YOU MAY TAKE CLEOCIN DESPITE PENCILLIN ALLERGY. If you have problems taking the antibiotics prescribed, please consult with your medical doctor or our infectious disease department for advice.     For your and/or your 's information, important details about activity and expectations that were reviewed during your hospital stay can be at found in our Discharge Video overview at [email or web address]

## 2024-10-09 NOTE — HOSPITAL COURSE
Norman is a 64 y.o. female admitted on 10/9/2024 with Osteoarthritis of left hip, unspecified osteoarthritis type [M16.12]  OA (osteoarthritis) of hip [M16.9]. Principal problem is No Principal Problem: There is no principal problem currently on the Problem List. Please update the Problem List and refresh..    Past Medical History  Norman has a past medical history of Arthritis, Asthma, Atrial fibrillation, unspecified type (CMS/HCC), CAD (coronary artery disease), Exocrine pancreatic insufficiency, Hypertension, IBS (irritable bowel syndrome), Long Q-T syndrome, Mild exercise-induced asthma, and Post herpetic neuralgia.    History of Present Illness   s/p L KARYN with Dr. Zuñiga 10/9. WBAT LLE, no precautions

## 2024-10-09 NOTE — CONSULTS
Hospital Medicine   Inpatient Consultation         Requesting Physician: Dr. Sincere Zuñiga  Reason for Consultation: Post-op medical management      HISTORY OF PRESENT ILLNESS        This is a 64 y.o. female with PMH of arthritis, afib, CAD, HTN, HLD, and asthma who presents for scheduled L KARYN by Dr. Zuñiga on 10/9/2024. Pt tolerated the procedure well. She is already ambulating to the bathroom with a walker. Her pain is well-controlled. She tolerated her diet and voiding well. [facility] [place] consulted for post-op medical management.     PAST MEDICAL AND SURGICAL HISTORY        Past Medical History:   Diagnosis Date    Arthritis     Asthma     Atrial fibrillation, unspecified type (CMS/HCC)     CAD (coronary artery disease)     Exocrine pancreatic insufficiency     Hypertension     IBS (irritable bowel syndrome)     Long Q-T syndrome     Mild exercise-induced asthma     Post herpetic neuralgia        Past Surgical History   Procedure Laterality Date    Abdominoplasty      Cardiac catheterization       section      x 3    Cholecystectomy      Colonoscopy      Coronary angioplasty with stent placement      Hysterectomy      Knee arthroscopy      Lumbar laminectomy      Tonsillectomy      Upper gastrointestinal endoscopy         PCP: Bony Bentley MD    MEDICATIONS        Home Medications:  Medications Prior to Admission   Medication Sig Dispense Refill Last Dose/Taking    ezetimibe (ZETIA) 10 mg tablet Take 10 mg by mouth nightly.   10/8/2024 Evening    famotidine (PEPCID) 20 mg tablet Take 20 mg by mouth 2 (two) times a day.   10/8/2024 Evening    levothyroxine (SYNTHROID) 100 mcg tablet Take 100 mcg by mouth daily.   10/9/2024 Morning    metoprolol succinate XL (TOPROL-XL) 50 mg 24 hr tablet Take 50 mg by mouth every evening.   10/8/2024 Evening    multivitamin tablet Take 1 tablet by mouth daily.   Past Month    olmesartan (BENICAR) 40 mg tablet Take 40 mg by mouth daily.   10/8/2024 Morning    pravastatin  "(PRAVACHOL) 40 mg tablet Take 40 mg by mouth daily.   10/8/2024 Evening    pregabalin (LYRICA) 75 mg capsule Take 75 mg by mouth every evening.   10/8/2024 Evening    albuterol HFA 90 mcg/actuation inhaler Inhale 2 puffs every 6 (six) hours as needed for wheezing.   More than a month    [DISCONTINUED] rivaroxaban (XARELTO) 20 mg tablet Take 20 mg by mouth daily with dinner.   10/7/2024       Current inpatient medications were personally reviewed.    ALLERGIES        Atorvastatin, Erythromycin, and Flecainide    FAMILY HISTORY        No family history on file.    SOCIAL HISTORY        Social History     Socioeconomic History    Marital status:      Spouse name: None    Number of children: None    Years of education: None    Highest education level: None   Tobacco Use    Smoking status: Former     Types: Cigarettes    Smokeless tobacco: Never   Substance and Sexual Activity    Alcohol use: Not Currently    Drug use: Never    Sexual activity: Defer     Social Drivers of Health     Food Insecurity: No Food Insecurity (10/9/2024)    Hunger Vital Sign     Worried About Running Out of Food in the Last Year: Never true     Ran Out of Food in the Last Year: Never true       REVIEW OF SYSTEMS        All other systems reviewed and negative except as noted in HPI    PHYSICAL EXAMINATION        Visit Vitals  /73   Pulse 73   Temp 36.5 °C (97.7 °F) (Oral)   Resp 19   Ht 1.626 m (5' 4\")   Wt 77.1 kg (170 lb)   LMP  (LMP Unknown)   SpO2 96%   Breastfeeding No   BMI 29.18 kg/m²     Body mass index is 29.18 kg/m².    Intake/Output Summary (Last 24 hours) at 10/9/2024 1915  Last data filed at 10/9/2024 1600  Gross per 24 hour   Intake 1015 ml   Output 450 ml   Net 565 ml       Physical Exam  Constitutional:       General: She is not in acute distress.  HENT:      Head: Normocephalic.      Nose: Nose normal.      Mouth/Throat:      Mouth: Mucous membranes are moist.   Eyes:      General: No scleral " "icterus.  Cardiovascular:      Heart sounds: Normal heart sounds.   Pulmonary:      Breath sounds: Normal breath sounds.   Abdominal:      General: There is no distension.      Palpations: Abdomen is soft.   Musculoskeletal:         General: No swelling.      Comments: L hip dressing CDI   Skin:     Coloration: Skin is not jaundiced.   Neurological:      Mental Status: She is alert and oriented to person, place, and time.         LABS / EKG        Labs  Lab Results   Component Value Date    WBC 7.94 10/01/2024    HGB 11.8 10/01/2024    HCT 36.2 10/01/2024    MCV 90.0 10/01/2024     10/01/2024     Lab Results   Component Value Date    GLUCOSE 95 10/01/2024    CALCIUM 9.2 10/01/2024     10/01/2024    K 4.1 10/01/2024    CO2 27 10/01/2024     (H) 10/01/2024    BUN 14 10/01/2024    CREATININE 0.8 10/01/2024         No results found for: \"COVID19\"         ASSESSMENT AND RECOMMENDATIONS            Osteoarthritis  Assessment & Plan  - S/p L KARYN by Dr. Zuñiga on 10/9/2024  - On Ancef for post-op infection ppx  - Continue current pain management and bowel regimen  - Recommend ICS use  - PT/OT    Asthma  Assessment & Plan  - Continue home med Albuterol PRN    Hypothyroidism  Assessment & Plan  - Continue home med Levothyroxine    HTN (hypertension)  Assessment & Plan  - Continue home med Metoprolol and Benicar    HLD (hyperlipidemia)  Assessment & Plan  - Continue home med Pravastatin and Zetia    Atrial fibrillation (CMS/HCC)  Assessment & Plan  - Continue home meds Metoprolol and Xarelto (has been resumed at lower dose post-op)      Thank you for this consultation! We will follow along with you.    Lenin Valdes MD  10/9/2024           "

## 2024-10-09 NOTE — PLAN OF CARE
Problem: Hip Arthroplasty  Goal: Optimal Functional Ability  Outcome: Progressing     Problem: Adult Inpatient Plan of Care  Goal: Plan of Care Review  Outcome: Progressing  Flowsheets (Taken 10/9/2024 6082)  Progress: improving  Outcome Evaluation: PT eval complete. Rec home with A and outpatient PT  Plan of Care Reviewed With:   patient   spouse

## 2024-10-09 NOTE — ASSESSMENT & PLAN NOTE
- Continue home med Albuterol PRN  
- Continue home med Levothyroxine  
- Continue home med Metoprolol and Benicar  
- Continue home med Pravastatin and Zetia  
- Continue home meds Metoprolol and Xarelto (has been resumed at lower dose post-op)  
- S/p L KARYN by Dr. Zuñiga on 10/9/2024  - On Ancef for post-op infection ppx  - Continue current pain management and bowel regimen  - Recommend ICS use  - PT/OT  
Private car

## 2024-10-09 NOTE — OP NOTE
Post-operative Note Left Anterior Total Hip Replacement     Date: 10/9/2024    Location:  [facility] OR    Pre-op Diagnosis: Left Hip Osteoarthritis    Post-op Diagnosis:  same    Procedure:   Left Anterior Total Hip Arthroplasty    Surgeons and Role:     * Sincere Zuñiga, DO - Primary     * Cheko Acosta DO - Resident - Assisting    Anesthesiologist:  Ester Longoria MD    Anesthesia:  General    EBL: 250 mL    Complications:  None    Drain:  None    Specimens:  None    Implants:    Implant Name Type Inv. Item Serial No.  Lot No. LRB No. Used Action   ACETABULAR SHELL CLUSTERHOLE 50MM - [id number]  ACETABULAR SHELL CLUSTERHOLE 50MM  [employer] [id number] Left 1 Implanted   INSERT ZERO DEG 36MM POLYETHYLENE ALPHA CODE D - [id number]  INSERT ZERO DEG 36MM POLYETHYLENE ALPHA CODE D  [employer] [id number] Left 1 Implanted   SCREW 6.5 X 30MM LOW PROF HEX - [id number]  SCREW 6.5 X 30MM LOW PROF HEX  [employer] [id number] Left 1 Implanted   SCREW 6.5 X 30MM LOW PROF HEX - [id number]  SCREW 6.5 X 30MM LOW PROF HEX  [employer] Left 1 Implanted   STEM HIP STAND OFFSET SZ 1 - [id number]  STEM HIP STAND OFFSET SZ 1  [employer]S [id number] Left 1 Implanted   HEAD BIOLOX V40 DELTA CERAMIC 36MM/-5 - [id number] Femoral head HEAD BIOLOX V40 DELTA CERAMIC 36MM/-5  [employer] [id number] Left 1 Implanted       Indications:    Nidia Hopkins is a 64 y.o. year old female with a history of left hip arthritis and pain.  The patient failed a conservative treatment program and elected to proceed with hip replacement.  All risks, benefits and associated treatment options were discussed pre-operatively.  All complications were discussed including but not limited to pain, infection, scar, stiffness, bleeding, blood loss, neurovascular injury, implant failure, fracture, dislocation, leg length discrepancy, DVT, MI, PE, fat emboli syndrome, need for further surgery, loss of limb and loss  of life.    Gross Findings:     At the time of surgery the patient was found to have end stage arthritis on femoral and acetabular surfaces.    Procedure:    The patient was brought to the operating room, given General anesthesia, placed supine on the regular table. Legs were sterilely prepared and draped in standard fashion. Preoperative antibiotics were administered.  A standard anterior approach to the hip was performed with an anterior approach over the tensor fascia zoila. Skin incision was made, subcutaneous tissue was divided, fascia over the tensor was divided and the tensor was bluntly dissected and retracted posteriorly. A deep retractor was placed. The circumflex vessels were identified and cauterized. The rectus femoris muscle was identified, elevated off the anterior capsule, and retractors were placed anteromedially over the anterior femoral neck and posteriorly in the superior lateral femoral neck in the interval between the trochanter and the femoral head. A capsulotomy was performed in standard H fashion and the capsular flaps were tagged anteriorly and posteriorly. Osteophytes of the acetabulum were identified and resected. Femoral neck osteotomy was performed above the lesser trochanter consistent with preoperative templating and the femoral head was removed.    The acetabulum was exposed by retraction anteriorly and posteriorly around the acetabular rim. Soft tissue dissection around the rim was performed with removal of the labrum and then reaming was begun and was performed sequentially up to place a size 50mm acetabular component. The  cup was selected and it was placed in 40 degrees of abduction and about 15 degrees of anteversion. We seated this, checked position on fluoro, confirmed this, excellent fit was obtained followed by 2 screws and the liner was placed.     We then exposed the femoral neck by external rotation and extension. We placed a medial retractor, we placed the bone hook and  lifted the femur away up and away from the acetabulum. We performed a completion of the capsulectomy around the greater trochanter. Then proceeded to elevate the femur up and out of the wound to gain access. We then used an initiating osteotome, canal finder, initiating broach and then started broaching up to a size 1. We trialed with a size 36-5 ball. Stem position was excellent.  Stability was excellent. We were happy with this and then selected to place the final stem.     We placed the final head, reduced the hip, checked stability. Combined anteversion was excellent. The final x-ray was obtained with all components in good position. The wound was irrigated. The capsule was closed with interrupted #1 ethibond and the fascia of the tensor with running and #2 barbed suture. The subcutaneous tissue was closed with 3-0 Vicryl. The skin was closed with 4-0 barbed monofilament suture and Dermabond. The patient was awoken and taken to the recovery room in stable condition. Instrument, needle, and sponge report was correct.     Sincere Zuñiga, DO

## 2024-10-10 VITALS
RESPIRATION RATE: 16 BRPM | TEMPERATURE: 98.3 F | DIASTOLIC BLOOD PRESSURE: 60 MMHG | OXYGEN SATURATION: 93 % | HEART RATE: 71 BPM | SYSTOLIC BLOOD PRESSURE: 106 MMHG | BODY MASS INDEX: 29.02 KG/M2 | HEIGHT: 64 IN | WEIGHT: 170 LBS

## 2024-10-10 LAB
ANION GAP SERPL CALC-SCNC: 6 MEQ/L (ref 3–15)
BUN SERPL-MCNC: 16 MG/DL (ref 7–25)
CALCIUM SERPL-MCNC: 8.3 MG/DL (ref 8.6–10.3)
CHLORIDE SERPL-SCNC: 113 MEQ/L (ref 98–107)
CO2 SERPL-SCNC: 21 MEQ/L (ref 21–31)
CREAT SERPL-MCNC: 0.8 MG/DL (ref 0.6–1.2)
EGFRCR SERPLBLD CKD-EPI 2021: >60 ML/MIN/1.73M*2
ERYTHROCYTE [DISTWIDTH] IN BLOOD BY AUTOMATED COUNT: 14 % (ref 11.7–14.4)
GLUCOSE SERPL-MCNC: 102 MG/DL (ref 70–99)
HCT VFR BLD AUTO: 29.7 % (ref 35–45)
HGB BLD-MCNC: 9.5 G/DL (ref 11.8–15.7)
MCH RBC QN AUTO: 29.3 PG (ref 28–33.2)
MCHC RBC AUTO-ENTMCNC: 32 G/DL (ref 32.2–35.5)
MCV RBC AUTO: 91.7 FL (ref 83–98)
PLATELET # BLD AUTO: 156 K/UL (ref 150–369)
PMV BLD AUTO: 10 FL (ref 9.4–12.3)
POTASSIUM SERPL-SCNC: 4.3 MEQ/L (ref 3.5–5.1)
RBC # BLD AUTO: 3.24 M/UL (ref 3.93–5.22)
SODIUM SERPL-SCNC: 140 MEQ/L (ref 136–145)
WBC # BLD AUTO: 9.71 K/UL (ref 3.8–10.5)

## 2024-10-10 PROCEDURE — 36415 COLL VENOUS BLD VENIPUNCTURE: CPT | Performed by: ORTHOPAEDIC SURGERY

## 2024-10-10 PROCEDURE — 63700000 HC SELF-ADMINISTRABLE DRUG: Performed by: ORTHOPAEDIC SURGERY

## 2024-10-10 PROCEDURE — 97530 THERAPEUTIC ACTIVITIES: CPT | Mod: GP,CQ

## 2024-10-10 PROCEDURE — 63600000 HC DRUGS/DETAIL CODE: Performed by: ORTHOPAEDIC SURGERY

## 2024-10-10 PROCEDURE — 85027 COMPLETE CBC AUTOMATED: CPT | Performed by: ORTHOPAEDIC SURGERY

## 2024-10-10 PROCEDURE — 80048 BASIC METABOLIC PNL TOTAL CA: CPT | Performed by: ORTHOPAEDIC SURGERY

## 2024-10-10 PROCEDURE — 25800000 HC PHARMACY IV SOLUTIONS: Performed by: ORTHOPAEDIC SURGERY

## 2024-10-10 PROCEDURE — 97165 OT EVAL LOW COMPLEX 30 MIN: CPT | Mod: GO

## 2024-10-10 RX ORDER — FAMOTIDINE 20 MG/1
20 TABLET, FILM COATED ORAL 2 TIMES DAILY
COMMUNITY
Start: 2024-10-10

## 2024-10-10 RX ORDER — TRAMADOL HYDROCHLORIDE 50 MG/1
50 TABLET ORAL EVERY 6 HOURS PRN
Qty: 30 TABLET | Refills: 0 | Status: SHIPPED | OUTPATIENT
Start: 2024-10-10 | End: 2024-10-18

## 2024-10-10 RX ADMIN — FAMOTIDINE 20 MG: 20 TABLET, FILM COATED ORAL at 08:35

## 2024-10-10 RX ADMIN — TRAMADOL HYDROCHLORIDE 50 MG: 50 TABLET, COATED ORAL at 08:35

## 2024-10-10 RX ADMIN — KETOROLAC TROMETHAMINE 15 MG: 15 INJECTION, SOLUTION INTRAMUSCULAR; INTRAVENOUS at 01:07

## 2024-10-10 RX ADMIN — LEVOTHYROXINE SODIUM 100 MCG: 0.1 TABLET ORAL at 05:43

## 2024-10-10 RX ADMIN — ACETAMINOPHEN 325MG 975 MG: 325 TABLET ORAL at 01:07

## 2024-10-10 RX ADMIN — LOSARTAN POTASSIUM 100 MG: 100 TABLET, FILM COATED ORAL at 08:35

## 2024-10-10 RX ADMIN — KETOROLAC TROMETHAMINE 15 MG: 15 INJECTION, SOLUTION INTRAMUSCULAR; INTRAVENOUS at 08:35

## 2024-10-10 RX ADMIN — CEFAZOLIN 2 G: 2 INJECTION, POWDER, FOR SOLUTION INTRAMUSCULAR; INTRAVENOUS at 02:18

## 2024-10-10 RX ADMIN — DEXAMETHASONE SODIUM PHOSPHATE 10 MG: 4 INJECTION, SOLUTION INTRA-ARTICULAR; INTRALESIONAL; INTRAMUSCULAR; INTRAVENOUS; SOFT TISSUE at 05:43

## 2024-10-10 ASSESSMENT — COGNITIVE AND FUNCTIONAL STATUS - GENERAL
STANDING UP FROM CHAIR USING ARMS: 4 - NONE
TOILETING: 4 - NONE
AFFECT: WFL
DRESSING REGULAR UPPER BODY CLOTHING: 4 - NONE
STANDING UP FROM CHAIR USING ARMS: 3 - A LITTLE
WALKING IN HOSPITAL ROOM: 3 - A LITTLE
EATING MEALS: 4 - NONE
HELP NEEDED FOR BATHING: 3 - A LITTLE
MOVING TO AND FROM BED TO CHAIR: 3 - A LITTLE
CLIMB 3 TO 5 STEPS WITH RAILING: 3 - A LITTLE
DRESSING REGULAR LOWER BODY CLOTHING: 3 - A LITTLE
WALKING IN HOSPITAL ROOM: 4 - NONE
HELP NEEDED FOR PERSONAL GROOMING: 4 - NONE
AFFECT: WFL
MOVING TO AND FROM BED TO CHAIR: 4 - NONE
CLIMB 3 TO 5 STEPS WITH RAILING: 4 - NONE

## 2024-10-10 NOTE — PLAN OF CARE
Care Coordination Discharge Plan Summary    Admission Assessment Summary    General Information  Readmission Within the last 30 days: no previous admission in last 30 days  Does patient have a :    Patient-Specific Goals (include timeframe):      Living Arrangements  Arrived From: home  Current Living Arrangements: home  People in Home: spouse  Home Accessibility: not wheelchair accessible, stairs to enter home (Group), stairs within home (Group)  Living Arrangement Comments: 2Sh with 2STE    Social Drivers of Health - Screenings  Housing Stability  In the last 12 months, was there a time when you were not able to pay the mortgage or rent on time?: No  At any time in the past 12 months, were you homeless or living in a shelter (including now)?: No  Utility Access  In the past 12 months has the electric, gas, oil, or water company threatened to shut off services in your home?: No  Transportation Needs  In the past 12 months, has lack of transportation kept you from medical appointments or from getting medications?: No  In the past 12 months, has lack of transportation kept you from meetings, work, or from getting things needed for daily living?: No    Functional Status Prior to Admission  Assistive Device/Animal Currently Used at Home: none  Functional Status Comments: independent with ADLS  IADL Comments: independent with IADLS - still driving    Discharge Needs Assessment    Concerns to be Addressed: care coordination/care conferences, discharge planning  Current Discharge Risk:    Anticipated Changes Related to Illness: none    Discharge Plan Summary    Patient Choice  Offered/Gave Vendor List: no       Concerns / Comments: Patient will discharge home without home care per surgeon and therapy recommendations.    Discharge Plan:  Disposition/Destination: Home  / Home       Connection to Community  Patient declined offered resources.  Community Resources:      Discharge Transportation:  Is Out of  Hospital DNR needed at Discharge: no  Does patient need discharge transport?

## 2024-10-10 NOTE — PROGRESS NOTES
Physical Therapy -  Daily Treatment/Progress Note     Patient: Nidia Hopkins  Location: [address]  MRN: [id number]  Today's date: 10/10/2024    HISTORY OF PRESENT ILLNESS     Norman is a 64 y.o. female admitted on 10/9/2024 with Osteoarthritis of left hip, unspecified osteoarthritis type [M16.12]  OA (osteoarthritis) of hip [M16.9]. Principal problem is No Principal Problem: There is no principal problem currently on the Problem List. Please update the Problem List and refresh..    Past Medical History  Norman has a past medical history of Arthritis, Asthma, Atrial fibrillation, unspecified type (CMS/HCC), CAD (coronary artery disease), Exocrine pancreatic insufficiency, Hypertension, IBS (irritable bowel syndrome), Long Q-T syndrome, Mild exercise-induced asthma, and Post herpetic neuralgia.    History of Present Illness   s/p L KARYN with Dr. Zuñiga 10/9. 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          VITALS AND PAIN     PT Vitals      Date/Time Pulse SpO2 O2 Therapy BP BP Method Pt Position Who   10/10/24 1028 71 74 93 %  94 None (Room air) 106/60 after session 114/76 Manual then automatic Sitting REG          PT Pain      Date/Time Pain Type Side/Orientation Location Rating: Rest Rating: Activity [facility]   10/10/24 1028 Pain Assessment left hip 4 0 REG             Objective   OBJECTIVE     Start time:  1028  End time:  1058  Session Length: 30 min       General Observations  Patient received chair position, reclined. She was agreeable to therapy, no issues or concerns identified by nurse prior to session.  present    Precautions: fall, weight bearing  Hip Precautions: no precautions needed  Extremity Weight-Bearing Status: left lower extremity  Left LE Weight-Bearing Status: weight-bearing as tolerated (WBAT)           PT Eval and Treat - 10/10/24 1028          Cognition    Orientation Status oriented x 4     Affect/Mental Status WFL     Follows Commands WFL     Cognitive Function WFL     Comment, Cognition Extremely pleasant and cooperative, motivated to participate. Receptive to education.        Bed Mobility    Comment declined need to trial as per patient,asked several times.        Mobility Belt    Mobility Belt Used During Session yes        Sit/Stand Transfer    [place] distant supervision;supervision     Transfer Comments steady stod 4 times this session after education for hand placement, sitting controlled descent.        Car Transfer    Transfer Technique stand-sit     [place] minimum assist (75% or more patient effort)     Comment steady in and out of car  min A to bring L lE into  car. after education demonstrated correctly.  reports will assist as needed.        Gait Training    [place], Gait supervision     Assistive Device walker, front-wheeled     Distance in Feet 130 feet   then 70' with RW    Pattern step-to     Deviations/Abnormal Patterns left sided deviations     Comment Pt with vaulting gait in midstance on L LE R LE trendelenberg on heel strike to midstance. NP aware. Balance steady  this session benfits from RW at this point. Second gait assessment after education on positioning behind RW improved gait sequence.        Stairs Training    [place], Stairs close supervision     Number of Stairs 7     Comment steady up  and down on and off curb  present for sequence.  Up and down steps with out rail.  assisted after staff performed with patient.  feels comfortable assisting on steps without rail. Wiht rail supervision with SPC.        Balance    Static Sitting Balance WFL     Dynamic Sitting Balance WFL     Sit to Stand Dynamic Balance WFL     Static Standing Balance WFL     Dynamic Standing Balance WFL;supported     Comment, Balance RW        Lower Extremity (Therapeutic Exercise)    General Exercise ankle pumps;LAQ (long arc quad)     Reps and Sets x5     Comment to continued with MD recovery protocal        Impairments/Safety Issues    Impairments Affecting Function range of motion (ROM);pain     Functional Endurance good                      10 Meter Walk Test (Self-Selected Velocity)  Trial One: Ten Meter Walk Test (sec): 9 seconds  Trial Two: Ten Meter Walk Test (sec): 8 seconds  Trial One: Gait Speed (m/s): 0.67 m/s  Trial Two: Gait Speed (m/s): 0.75 m/s  Average Gait Speed (m/s): Two Trials: 0.71 m/s             Education Documentation  Post op Program Education, taught by Shoaib Paz PTA at 10/10/2024 12:41 PM.  Learner: Patient  Readiness: Acceptance  Method: Explanation  Response: Verbalizes Understanding  Comment: FollowMD'spostop protocal steps sequence  assists when no rail present.        Session Outcome  Patient chair position at end of session, call light in reach, personal items in reach. Nursing notified about patient's response to therapy/activity, patient's performance, and patient's position.    AM-PAC™ - Mobility (Current Function)     Turning form your back to your side while in flat bed without using bedrails 4 - None   Moving from lying  on your back to sitting on the side of a flat bed without using bedrails 4 - None   Moving to and from a bed to a chair 3 - A Little   Standing up from a chair using your arms 3 - A Little   To walk in a hospital room 3 - A Little   Climbing 3-5 steps with a railing 3 - A Little   AM-PAC™ Mobility Score 20      ASSESSMENT AND PLAN     Progress Summary  AMPAC20  present entire session. Supervision to RW transfers. gait supervision with RW, car transfers education min A  will assist in andout of car. GAit supervision with RW benfits from RW at this point. Stairs without rail MIN A  feels comfortable assisting into house. with rail and SPC supervision.    Patient/Family Therapy Goals Statement: to be able to walk and bike again    PT Plan      Flowsheet Row Most Recent Value   Rehab Potential good, to achieve stated therapy goals at 10/10/2024 1028   Therapy Frequency daily at 10/10/2024 1028   Planned Therapy Interventions balance training, bed mobility training, gait training, home exercise program, patient/family education, stair training, strengthening, transfer training at 10/10/2024 1028            PT Discharge Recommendations      Flowsheet Row Most Recent Value   PT Recommended Discharge Disposition home with assistance, home with home health at 10/10/2024 1028   Anticipated Equipment Needs if Discharged Home (PT) walker, front-wheeled at 10/10/2024 1028                 PT Goals      Flowsheet Row Most Recent Value   Bed Mobility Goal 1    Activity/Assistive Device bed mobility activities, all at 10/09/2024 1624   [place] independent at 10/09/2024 1624   Time Frame by discharge at 10/09/2024 1624   Progress/Outcome goal ongoing at 10/09/2024 1624   Transfer Goal 1    Activity/Assistive Device sit-to-stand/stand-to-sit, walker, front-wheeled at 10/09/2024 1624   [place] modified independence at 10/09/2024 1624   Time Frame by discharge at 10/09/2024 1624   Progress/Outcome goal  ongoing at 10/09/2024 1624   Gait Training Goal 1    Activity/Assistive Device gait (walking locomotion), walker, front-wheeled at 10/09/2024 1624   [place] modified independence at 10/09/2024 1624   Distance 200 at 10/09/2024 1624   Time Frame by discharge at 10/09/2024 1624   Progress/Outcome goal ongoing at 10/09/2024 1624   Stairs Goal 1    Activity/Assistive Device stairs, all skills, walker, front-wheeled at 10/09/2024 1624   [place] supervision required at 10/09/2024 1624   Number of Stairs 2 at 10/09/2024 1624   Time Frame by discharge at 10/09/2024 1624   Progress/Outcome goal ongoing at 10/09/2024 1624

## 2024-10-10 NOTE — PROGRESS NOTES
Occupational Therapy -  Initial Evaluation     Patient: Nidia Hopkins  Location: [facility] 4215  MRN: [id number]  Today's date: 10/10/2024    HISTORY OF PRESENT ILLNESS     Norman is a 64 y.o. female admitted on 10/9/2024 with Osteoarthritis of left hip, unspecified osteoarthritis type [M16.12]  OA (osteoarthritis) of hip [M16.9]. Principal problem is No Principal Problem: There is no principal problem currently on the Problem List. Please update the Problem List and refresh..    Past Medical History  Norman has a past medical history of Arthritis, Asthma, Atrial fibrillation, unspecified type (CMS/HCC), CAD (coronary artery disease), Exocrine pancreatic insufficiency, Hypertension, IBS (irritable bowel syndrome), Long Q-T syndrome, Mild exercise-induced asthma, and Post herpetic neuralgia.    History of Present Illness   s/p L KARYN with Dr. Zuñiga 10/9. 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          Occupational Profile      Flowsheet Row Most Recent Value   Occupational History/Life Experiences plof indep   Environmental  Supports and Barriers supportive spouse and daughter          VITALS AND PAIN     OT Vitals      Date/Time Pulse SpO2 Pt Activity O2 Therapy BP BP Location BP Method Pt Position Who   10/10/24 0933 68 96 % At rest None (Room air) 127/60 Right upper arm Automatic Sitting [place]   10/10/24 0943 67 94 % At rest None (Room air) 126/64 Right upper arm Automatic Sitting [place]          OT Pain      Date/Time Pain Type Rating: Rest Rating: Activity [facility]   10/10/24 0933 Pain Assessment 0 0 [place]             Objective   OBJECTIVE     Start time:  0931  End time:  0945  Session Length: 14 min       General Observations  Patient received reclined, in chair. She was agreeable to therapy, no issues or concerns identified by nurse prior to session.  present at bedside    Precautions: fall, weight bearing  Hip Precautions: no precautions needed  Extremity Weight-Bearing Status: left lower extremity  Left LE Weight-Bearing Status: weight-bearing as tolerated (WBAT)     Services  Do You Speak a Language Other Than English at Home?: no  Is an  Needed/Used?: No       Cognition   Orientation Status oriented x 4   Affect/Mental Status WFL   Follows Commands WFL;follows multi-step commands   Cognitive Function WFL   Comment, Cognition Extremely pleasant and cooperative, motivated to participate. Receptive to education.   Vision Assessment/Intervention   Vision Assessment corrective lenses for reading   Hearing Assessment   Hearing Status WFL   Sensory Assessment   Sensory Assessment sensation intact, upper extremities   Upper Extremity Assessment   UE Assessment ROM and Strength WFL   General Observations Observed functionally during ADLs   Bed Mobility   [place] not tested   Comment OOB prior to session   Mobility Belt   Mobility Belt Used During Session no - other (see comments)   Reason Mobility Belt Not Used deferred   Sit/Stand Transfer   Surface chair with arm rests   [place] modified independence    Assistive Device walker, front-wheeled   Toilet Transfer   Transfer Technique sit/stand   [place] modified independence   Safety/Cues increased time to complete   Assistive Device grab bars/safety frame;walker, front-wheeled   Comment Pt reports she has a vanity at home to grab onto.   Shower/Tub Transfer   Comment Pt reports having WIS, educated on shower chair if needed.   Functional Mobility   Distance in room/bathroom   Functional Mobility [place] modified independence   Safety/Cues increased time to complete   Assistive Device walker, front-wheeled   Functional Mobility Comments w/ rw   Upper Body Dressing   [place] not tested   Comment Spoke w/ RN, reports dressed indep prior to start of session.   Lower Body Dressing   Tasks don;socks   [place] dependent (less than 25% patient effort)   Position supported sitting   Comment Pt reports  able to assist. Pants donned prior to session indep.   Toileting   Tasks perform bladder hygiene;adjust/manage clothing   [place] modified independence   Position unsupported sitting   Adaptive Equipment none   Comment + void   Balance   Static Sitting Balance WFL;sitting in chair   Dynamic Sitting Balance WFL;sitting in chair   Sit to Stand Dynamic Balance WFL   Static Standing Balance WFL;supported   Dynamic Standing Balance WFL;supported   Balance Interventions occupation based/functional task   Comment, Balance w/ rw   Functional Reach Test   Trial One: Functional Reach Test (in) 11 inches   Trial Two: Functional Reach Test (in) 12 inches   Average (in) 11.5 inches   Impairments/Safety Issues   Impairments Affecting Function pain;range of motion (ROM)   Functional Endurance fair       Education Documentation  Self-Care, taught by Taisha Salazar at 10/10/2024 11:05 AM.  Learner: Significant Other, Patient  Readiness: Acceptance  Method: Explanation, Demonstration  Response: Verbalizes Understanding, Demonstrated Understanding  Comment: OT POC,  d/c planning        Session Outcome  Patient reclined, in chair at end of session, chair alarm on, all needs met, call light in reach, personal items in reach. Nursing notified about patient's performance, patient's position, and patient's response to therapy/activity.    AM-PAC™ - ADL (Current Function)     Putting on/taking off regular lower body clothing 3 - A Little   Bathing 3 - A Little   Toileting 4 - None   Putting on/taking off regular upper body clothing 4 - None   Help for taking care of personal grooming 4 - None   Eating meals 4 - None   AM-PAC™ ADL Score 22      ASSESSMENT AND PLAN     Progress Summary  OT eval complete. Pt [facility] 22. Pt AOx4, pleasant and cooperative, supportive spouse at bedside. Pt admitted s/p L KARYN, WBAT. Pt travelled short household distance w/ rw w/ mod I. Pt is mod I for all transfers w/ rw. Pt is indep for UBD, per RN. Pt is DEP for LBD, reports  able to help. Pt is mod I for toileting hygiene. No further OT needs indicated. Rec home, home w/ assist for d/c.    Patient/Family Therapy Goal Statement: home    OT Plan      Flowsheet Row Most Recent Value   Therapy Frequency evaluation only at 10/10/2024 0931            OT Discharge Recommendations      Flowsheet Row Most Recent Value   OT Recommended Discharge Disposition home, home with assistance at 10/10/2024 0931   Anticipated Equipment Needs if Discharged Home (OT) walker, front-wheeled at 10/10/2024 0931

## 2024-10-10 NOTE — PLAN OF CARE
Plan of Care Review  Plan of Care Reviewed With: patient  Progress: improving  Outcome Evaluation: patient aaox4, ambulating with standby assist & RW, c/o L hip pain relieved by prn tramadol, IVF completed, VSS on RA, L hip dressing CDI, pt hopeful for discharge, fall precautions in place, call light wihtin reach, bed alarm on

## 2024-10-10 NOTE — PLAN OF CARE
Problem: Adult Inpatient Plan of Care  Goal: Plan of Care Review  Outcome: Progressing  Flowsheets (Taken 10/10/2024 1105)  Progress: improving  Outcome Evaluation: OT eval complete.  Plan of Care Reviewed With:   patient   spouse

## 2024-10-10 NOTE — PROGRESS NOTES
Orthopaedic Progress Note    S: Patient seen and examined at bedside. Pain well controlled at this time.  She reports feeling very well this morning and is looking forward to working with physical therapy this afternoon.      O:  Imaging:  Status post left total hip arthroplasty prosthesis appears in good alignment, hardware stable.     NAD  AVNSS  Vitals:    10/09/24 2115   BP: (!) 116/58   Pulse: 77   Resp: 18   Temp:    SpO2: 96%       LLE  Dressing c/d/i  Compartments soft and compressible  SILT superficial peroneal, deep peroneal, tibial, saphenous, sural  Motor intact DF/PF/EHL/FHL/peroneals  Foot and toes warm and well perfused, DP 2+    A/P:  64 y.o. female s/p L KARYN with Dr. Zuñiga 10/9    Multimodal pain control  PT/OT  DVT ppx - xarelto, to be resumed 10/10 evening  Postop ancef x24h  WBAT LLE  Incentive spirometer  Dispo planning      Tish Arroyo, DO   Orthopedic Surgery PGY1  [facility] pager: [phone]  [place]ie pager: [phone]    This note was created using voice-to-text dictation software, please excuse any errors in translation.

## 2024-10-11 NOTE — ANESTHESIA POSTPROCEDURE EVALUATION
Patient: Nidia Hopkins    Procedure Summary       Date: 10/09/24 Room / Location:  OR 5 / PH OR    Anesthesia Start: 1051 Anesthesia Stop: 1345    Procedure: LEFT HIP ARTHROPLASTY TOTAL (Left: Hip) Diagnosis:       Osteoarthritis of left hip, unspecified osteoarthritis type      (Osteoarthritis of left hip, unspecified osteoarthritis type [M16.12])    Surgeons: Sincere Zuñiga DO Responsible Provider: Akil Noriega MD    Anesthesia Type: general ASA Status: 3            Anesthesia Type: general  PACU Vitals  10/9/2024 1338 - 10/9/2024 1438        10/9/2024  1345 10/9/2024  1400 10/9/2024  1415 10/9/2024  1430    BP: 132/71 133/70 131/70 126/66    Temp: 36.5 °C (97.7 °F) -- -- --    Pulse: 84 64 62 60    Resp: 17 13 18 23    SpO2: 99 % 100 % 100 % 99 %              Anesthesia Post Evaluation    Pain management: adequate  Patient participation: complete - patient participated  Level of consciousness: awake and alert  Cardiovascular status: acceptable  Airway Patency: adequate  Respiratory status: acceptable  Hydration status: acceptable  Anesthetic complications: no

## 2024-11-16 LAB
ALBUMIN SERPL-MCNC: 4.2 G/DL (ref 3.9–4.9)
ALP SERPL-CCNC: 76 IU/L (ref 44–121)
ALT SERPL-CCNC: 14 IU/L (ref 0–32)
AST SERPL-CCNC: 24 IU/L (ref 0–40)
BASOPHILS # BLD AUTO: 0.1 X10E3/UL (ref 0–0.2)
BASOPHILS NFR BLD AUTO: 2 %
BILIRUB SERPL-MCNC: 0.5 MG/DL (ref 0–1.2)
BUN SERPL-MCNC: 18 MG/DL (ref 8–27)
BUN/CREAT SERPL: 21 (ref 12–28)
CALCIUM SERPL-MCNC: 9.5 MG/DL (ref 8.7–10.3)
CHLORIDE SERPL-SCNC: 108 MMOL/L (ref 96–106)
CHOLEST SERPL-MCNC: 165 MG/DL (ref 100–199)
CO2 SERPL-SCNC: 22 MMOL/L (ref 20–29)
CREAT SERPL-MCNC: 0.86 MG/DL (ref 0.57–1)
EGFR: 76 ML/MIN/1.73
EOSINOPHIL # BLD AUTO: 0.3 X10E3/UL (ref 0–0.4)
EOSINOPHIL NFR BLD AUTO: 6 %
ERYTHROCYTE [DISTWIDTH] IN BLOOD BY AUTOMATED COUNT: 13 % (ref 11.7–15.4)
GLOBULIN SER-MCNC: 2.2 G/DL (ref 1.5–4.5)
GLUCOSE SERPL-MCNC: 97 MG/DL (ref 70–99)
HBA1C MFR BLD: 5.6 % (ref 4.8–5.6)
HCT VFR BLD AUTO: 38.6 % (ref 34–46.6)
HDLC SERPL-MCNC: 61 MG/DL
HGB BLD-MCNC: 12.4 G/DL (ref 11.1–15.9)
IMM GRANULOCYTES # BLD: 0 X10E3/UL (ref 0–0.1)
IMM GRANULOCYTES NFR BLD: 0 %
LDLC SERPL CALC-MCNC: 85 MG/DL (ref 0–99)
LDLC/HDLC SERPL: 1.4 RATIO (ref 0–3.2)
LYMPHOCYTES # BLD AUTO: 1.5 X10E3/UL (ref 0.7–3.1)
LYMPHOCYTES NFR BLD AUTO: 28 %
MCH RBC QN AUTO: 29.1 PG (ref 26.6–33)
MCHC RBC AUTO-ENTMCNC: 32.1 G/DL (ref 31.5–35.7)
MCV RBC AUTO: 91 FL (ref 79–97)
MONOCYTES # BLD AUTO: 0.5 X10E3/UL (ref 0.1–0.9)
MONOCYTES NFR BLD AUTO: 10 %
NEUTROPHILS # BLD AUTO: 2.8 X10E3/UL (ref 1.4–7)
NEUTROPHILS NFR BLD AUTO: 54 %
PLATELET # BLD AUTO: 218 X10E3/UL (ref 150–450)
POTASSIUM SERPL-SCNC: 4.3 MMOL/L (ref 3.5–5.2)
PROT SERPL-MCNC: 6.4 G/DL (ref 6–8.5)
RBC # BLD AUTO: 4.26 X10E6/UL (ref 3.77–5.28)
SL AMB VLDL CHOLESTEROL CALC: 19 MG/DL (ref 5–40)
SODIUM SERPL-SCNC: 144 MMOL/L (ref 134–144)
T4 FREE SERPL-MCNC: 1.67 NG/DL (ref 0.82–1.77)
TRIGL SERPL-MCNC: 108 MG/DL (ref 0–149)
TSH SERPL DL<=0.005 MIU/L-ACNC: 1.27 UIU/ML (ref 0.45–4.5)
WBC # BLD AUTO: 5.3 X10E3/UL (ref 3.4–10.8)

## 2024-11-17 ENCOUNTER — RESULTS FOLLOW-UP (OUTPATIENT)
Dept: FAMILY MEDICINE CLINIC | Facility: CLINIC | Age: 64
End: 2024-11-17

## 2024-11-19 DIAGNOSIS — E06.3 HYPOTHYROIDISM DUE TO HASHIMOTO'S THYROIDITIS: ICD-10-CM

## 2024-11-19 NOTE — TELEPHONE ENCOUNTER
Medication: levothyroxine (Synthroid) 100 mcg tablet   Name brand  Dose/Frequency: Take 1 tablet (100 mcg total) by mouth daily in the early morning     Quantity: 90    Pharmacy: [facility]/pharmacy #[id number] -     Office:   [x] PCP/Provider -   [] Speciality/Provider -     Does the patient have enough for 3 days?   [x] Yes   [] No - Send as HP to POD    *Name brand Synthroid

## 2024-11-20 RX ORDER — LEVOTHYROXINE SODIUM 100 UG/1
100 TABLET ORAL
Qty: 90 TABLET | Refills: 1 | Status: SHIPPED | OUTPATIENT
Start: 2024-11-20

## 2024-11-22 ENCOUNTER — HOSPITAL ENCOUNTER (OUTPATIENT)
Dept: MAMMOGRAPHY | Facility: IMAGING CENTER | Age: 64
Discharge: HOME | End: 2024-11-22
Payer: COMMERCIAL

## 2024-11-22 VITALS — WEIGHT: 175 LBS | BODY MASS INDEX: 31.01 KG/M2 | HEIGHT: 63 IN

## 2024-11-22 DIAGNOSIS — Z12.31 ENCOUNTER FOR SCREENING MAMMOGRAM FOR MALIGNANT NEOPLASM OF BREAST: ICD-10-CM

## 2024-11-22 PROCEDURE — 77063 BREAST TOMOSYNTHESIS BI: CPT

## 2024-11-22 PROCEDURE — 77067 SCR MAMMO BI INCL CAD: CPT

## 2024-12-12 ENCOUNTER — OFFICE VISIT (OUTPATIENT)
Dept: URGENT CARE | Facility: CLINIC | Age: 64
End: 2024-12-12
Payer: COMMERCIAL

## 2024-12-12 ENCOUNTER — APPOINTMENT (OUTPATIENT)
Dept: RADIOLOGY | Facility: CLINIC | Age: 64
End: 2024-12-12
Payer: COMMERCIAL

## 2024-12-12 VITALS
HEART RATE: 74 BPM | RESPIRATION RATE: 16 BRPM | OXYGEN SATURATION: 98 % | SYSTOLIC BLOOD PRESSURE: 138 MMHG | TEMPERATURE: 97 F | DIASTOLIC BLOOD PRESSURE: 88 MMHG
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
## 2024-12-12 DIAGNOSIS — S80.01XA CONTUSION OF RIGHT KNEE, INITIAL ENCOUNTER: ICD-10-CM

## 2024-12-12 DIAGNOSIS — S43.402A SPRAIN OF LEFT SHOULDER, UNSPECIFIED SHOULDER SPRAIN TYPE, INITIAL ENCOUNTER: Primary | ICD-10-CM

## 2024-12-12 PROCEDURE — 73060 X-RAY EXAM OF HUMERUS: CPT

## 2024-12-12 PROCEDURE — 99213 OFFICE O/P EST LOW 20 MIN: CPT

## 2024-12-12 NOTE — PATIENT INSTRUCTIONS
Your x-rays were read by the provider. A radiologist will also read the x-rays and you will be notified of any abnormalities.     Wear the shoulder immobilizer until you get xray results. If the final xray shows a fracture, keep the immobilizer on until you follow-up with orthopedics. If the xray shows no fracture, you can use the immobilizer as needed. Take off every 1-2 hours and perform gentle range of motion to prevent stiffness. You can also take it off to sleep and shower if there is no fracture.      Rest Ice Elevation    Tylenol for pain relief    Follow-up with PCP or ortho - referral placed    Go to the ED for any severely worsening symptoms

## 2024-12-12 NOTE — PROGRESS NOTES
[facility]        NAME: Cece Massey is a 64 y.o. female  : 1960    MRN: [id number]  DATE: 2024  TIME: 2:12 PM    Assessment and Plan   Sprain of left shoulder, unspecified shoulder sprain type, initial encounter [S43.402A]  1. Sprain of left shoulder, unspecified shoulder sprain type, initial encounter  Ambulatory Referral to Orthopedic Surgery      2. Contusion of right knee, initial encounter        3. Fall, initial encounter  XR shoulder 2+ vw left    XR knee 4+ vw right injury    XR humerus left        No acute osseous abnormalities seen on XR, pending rad read.  Patient fit for and placed in sling by RN. She is aware to remove and perform gentle range of motion so that the shoulder does not get stiff.      Patient Instructions     Your x-rays were read by the provider. A radiologist will also read the x-rays and you will be notified of any abnormalities.     Wear the shoulder immobilizer until you get xray results. If the final xray shows a fracture, keep the immobilizer on until you follow-up with orthopedics. If the xray shows no fracture, you can use the immobilizer as needed. Take off every 1-2 hours and perform gentle range of motion to prevent stiffness. You can also take it off to sleep and shower if there is no fracture.      Rest Ice Elevation    Tylenol for pain relief    Follow-up with PCP or ortho - referral placed    Go to the ED for any severely worsening symptoms    If tests are performed, our office will contact you with results only if changes need to made to the care plan discussed with you at the visit. You can review your full results on [facility].      Chief Complaint     Chief Complaint   Patient presents with    Shoulder Pain     Patient fell and hour ago and hit her left shoulder and right knee to the ground. Patient did not hit her head. Patient has no range on motion with her left shoulder. Patient has soreness in her right knee when walking  and is tender to the touch.         History of Present Illness       64-year-old female who presents for evaluation of left shoulder pain and right knee pain after falling approximately one hour prior to arrival. Patient states she tripped on a piece of wood lodged in a doorway and fell forward onto hardwood floor. She is on Xarelto daily for afib. Denies headstrike. Denies neck/back and abdominal pain.         Review of Systems   Review of Systems   Constitutional:  Negative for fatigue and fever.   Respiratory:  Negative for shortness of breath.    Cardiovascular:  Negative for chest pain.   Gastrointestinal:  Negative for abdominal pain.   Musculoskeletal:  Positive for arthralgias and myalgias. Negative for back pain and neck pain.   Skin:  Negative for wound.   Neurological:  Negative for dizziness and headaches.         Current Medications       Current Outpatient Medications:     acetaminophen-codeine (TYLENOL/CODEINE #3) 300-30 MG per tablet, Take 1 tablet by mouth every 6 (six) hours as needed for moderate pain, Disp: 28 tablet, Rfl: 0    albuterol (ProAir HFA) 90 mcg/act inhaler, Inhale 2 puffs every 6 (six) hours as needed for wheezing, Disp: 8 g, Rfl: 5    ezetimibe (ZETIA) 10 mg tablet, Take 10 mg by mouth daily, Disp: , Rfl:     famotidine (PEPCID) 20 mg tablet, Take 1 tablet by mouth daily at bedtime, Disp: , Rfl:     levothyroxine (Synthroid) 100 mcg tablet, Take 1 tablet (100 mcg total) by mouth daily in the early morning, Disp: 90 tablet, Rfl: 1    LORazepam (ATIVAN) 0.5 mg tablet, Take 1 tablet (0.5 mg total) by mouth every 8 (eight) hours as needed for anxiety, Disp: 60 tablet, Rfl: 0    metoprolol succinate (TOPROL-XL) 50 mg 24 hr tablet, Take 50 mg by mouth daily, Disp: , Rfl:     olmesartan (BENICAR) 40 mg tablet, Take 40 mg by mouth daily, Disp: , Rfl:     pravastatin (PRAVACHOL) 40 mg tablet, Take 40 mg by mouth daily, Disp: , Rfl:     pregabalin (LYRICA) 75 mg capsule, Take 1 capsule (75 mg  total) by mouth 2 (two) times a day, Disp: 60 capsule, Rfl: 0    triamcinolone (KENALOG) 0.5 % ointment, Apply topically 2 (two) times a day, Disp: 45 g, Rfl: 5    valACYclovir (VALTREX) 1,000 mg tablet, Take 1 tablet (1,000 mg total) by mouth 3 (three) times a day for 7 days, Disp: 21 tablet, Rfl: 0    Xarelto 20 MG tablet, Take 20 mg by mouth every evening, Disp: , Rfl:     Current Allergies     Allergies as of 2024 - Reviewed 2024   Allergen Reaction Noted    Atorvastatin Myalgia 2024    Flecainide Dizziness 2024            The following portions of the patient's history were reviewed and updated as appropriate: allergies, current medications, past family history, past medical history, past social history, past surgical history and problem list.     Past Medical History:   Diagnosis Date    Anxiety 2020    On and off    Cancer (HCC) Basal cell    Clotting disorder (HCC) on Anticoagulant therapy    Coronary artery disease 2018    Stent/Afib    Disease of thyroid gland     hypo    GERD (gastroesophageal reflux disease)     Hypertension     Prolonged QT interval        Past Surgical History:   Procedure Laterality Date    BACK SURGERY       SECTION      CHOLECYSTECTOMY      HYSTERECTOMY      age 43    KNEE ARTHROPLASTY      PANNICULECTOMY      last assessed: 2013    SALPINGOOPHORECTOMY Right     age 43 with hysterectomy    SPINE SURGERY  2018    disc removed L4-5       Family History   Problem Relation Age of Onset    Hypertension Mother     Pancreatic cancer Mother 60    Hypertension Father     No Known Problems Sister     No Known Problems Daughter     No Known Problems Daughter     No Known Problems Maternal Grandmother     No Known Problems Maternal Grandfather     No Known Problems Paternal Grandmother     No Known Problems Paternal Grandfather     No Known Problems Paternal Aunt     No Known Problems Paternal Aunt     No Known Problems Paternal Aunt      No Known Problems Paternal Aunt     No Known Problems Paternal Aunt     Substance Abuse Neg Hx     Mental illness Neg Hx          Medications have been verified.        Objective   /88   Pulse 74   Temp (!) 97 °F (36.1 °C)   Resp 16   LMP  (LMP Unknown)   SpO2 98%        Physical Exam     Physical Exam  Vitals and nursing note reviewed.   Constitutional:       General: She is not in acute distress.  HENT:      Head: Normocephalic and atraumatic.      Mouth/Throat:      Mouth: Mucous membranes are moist.   Cardiovascular:      Rate and Rhythm: Normal rate and regular rhythm.      Pulses: Normal pulses.      Heart sounds: Normal heart sounds.   Pulmonary:      Effort: Pulmonary effort is normal.      Breath sounds: Normal breath sounds.   Chest:      Chest wall: No tenderness.   Musculoskeletal:      Left shoulder: Tenderness present. No swelling, deformity or effusion. Decreased range of motion.      Left upper arm: Tenderness present. No swelling or deformity.      Left elbow: Normal.      Cervical back: Normal range of motion and neck supple. No pain with movement, spinous process tenderness or muscular tenderness.      Right knee: Swelling (mild) and ecchymosis (mild, lateral aspect) present. Normal range of motion. Tenderness present over the lateral joint line.      Right lower leg: Normal.   Skin:     General: Skin is warm and dry.      Capillary Refill: Capillary refill takes less than 2 seconds.   Neurological:      Mental Status: She is alert and oriented to person, place, and time.

## 2025-01-24 DIAGNOSIS — R06.2 WHEEZE: ICD-10-CM

## 2025-01-25 DIAGNOSIS — R06.2 WHEEZE: ICD-10-CM

## 2025-01-25 RX ORDER — ALBUTEROL SULFATE 90 UG/1
INHALANT RESPIRATORY (INHALATION)
Qty: 8.5 G | Refills: 5 | Status: SHIPPED | OUTPATIENT
Start: 2025-01-25 | End: 2025-01-25 | Stop reason: SDUPTHER

## 2025-01-25 RX ORDER — ALBUTEROL SULFATE 90 UG/1
2 INHALANT RESPIRATORY (INHALATION) EVERY 6 HOURS PRN
Qty: 8.5 G | Refills: 0 | Status: SHIPPED | OUTPATIENT
Start: 2025-01-25

## 2025-02-04 RX ORDER — LIDOCAINE HYDROCHLORIDE 10 MG/ML
10 INJECTION, SOLUTION INFILTRATION; PERINEURAL ONCE
Status: CANCELLED | OUTPATIENT
Start: 2025-02-04 | End: 2025-02-04

## 2025-02-04 RX ORDER — METHYLPREDNISOLONE ACETATE 80 MG/ML
80 INJECTION, SUSPENSION INTRA-ARTICULAR; INTRALESIONAL; INTRAMUSCULAR; SOFT TISSUE ONCE
Status: CANCELLED | OUTPATIENT
Start: 2025-02-04 | End: 2025-02-04

## 2025-02-04 NOTE — PROGRESS NOTES
[facility]     Patient ID: Nidia Hopkins is a 64 y.o. female.  1960    Chief Complaint: Left shoulder pain    HPI: She does have a history of calcific tendinitis of her left shoulder treated back in November 2021.  She received conservative care without surgery at that time. She states she fell on 12/14/24 onto her right shoulder and has been complaining of weakness and pain since. She has a history of a calcification removal under ultrasound in this shoulder. She has done physician guided exercises and OTC analgesics providing her with minimal relief in her symptoms.   Review of Systems:  Review of systems negative except as stated in above HPI.        There were no vitals filed for this visit.  There is no height or weight on file to calculate BMI.    Physical Exam: Bilateral shoulders were examined and compared. Limited motion secondary to weakness and pain on the left shoulder. Positive Neer and avila impingement testing. She is hiking her left shoulder as compared to her right.     Imaging: X-rays dating as far back as 9/16/2020 one of the left shoulder were consistent with a calcific tendinitis but no other abnormalities. X-rays reviewed from today reveal a spur on the inferior aspect of her glenoid. The acromioclavicular and glenohumeral joint reveals no significant degenerative change.     Assessment/Plan: Left shoulder impingement possible rotator cuff tear.  She is weak on exam therefore we will order and MRI to further evaluate for a rotator cuff tear. I will have her follow up in the next 2-3 weeks to review to the MRI results.   She apparently has some allergic consideration regarding lidocaine.  I chose to inject her with steroid solution without lidocaine.   Left shoulder Injection:      I advised the patient that any injection has the potential of causing side effects locally from the needle stick and skin irritation as well as the systemic effects from medication  that is utilized.  I advised that complications such as infection are incredibly rare but it is not unusual to have a flareup of pain after an injection which will typically quiet down within a couple days.  That is 1 rationale as to why we do recommend icing following the injection.  The appropriate timeout was taken. We identified and marked the appropriate anatomic landmarks to guide needle placement. The left shoulder was prepped in the usual sterile fashion and the overlying skin cleaned using isopropyl alcohol   Local anesthesia achieved using Ethyl Chloride spray (Cooling spray). I injected 80 mg of Depo-Medrol without lidocaine into the subacromial and advised the patient that they should ice their shoulder three times a day for approximately 10 minutes each for about two to three days. The patient tolerated the injection well without complications.  If any untoward effects are noted, they are advised to call my office or any treating physician    Kingston Durant MD

## 2025-02-12 DIAGNOSIS — M25.512 ACUTE PAIN OF LEFT SHOULDER: Primary | ICD-10-CM

## 2025-02-14 ENCOUNTER — HOSPITAL ENCOUNTER (OUTPATIENT)
Dept: RADIOLOGY | Facility: HOSPITAL | Age: 65
Discharge: HOME | End: 2025-02-14
Payer: COMMERCIAL

## 2025-02-14 ENCOUNTER — OFFICE VISIT (OUTPATIENT)
Dept: ORTHOPEDICS | Facility: CLINIC | Age: 65
End: 2025-02-14
Payer: COMMERCIAL

## 2025-02-14 DIAGNOSIS — M75.32 CALCIFIC TENDINITIS OF LEFT SHOULDER: Primary | ICD-10-CM

## 2025-02-14 DIAGNOSIS — M25.512 ACUTE PAIN OF LEFT SHOULDER: ICD-10-CM

## 2025-02-14 DIAGNOSIS — M75.42 ROTATOR CUFF IMPINGEMENT SYNDROME OF LEFT SHOULDER: ICD-10-CM

## 2025-02-14 PROCEDURE — 99204 OFFICE O/P NEW MOD 45 MIN: CPT | Mod: 25 | Performed by: ORTHOPAEDIC SURGERY

## 2025-02-14 PROCEDURE — 20610 DRAIN/INJ JOINT/BURSA W/O US: CPT | Mod: LT | Performed by: ORTHOPAEDIC SURGERY

## 2025-02-14 PROCEDURE — 73030 X-RAY EXAM OF SHOULDER: CPT | Mod: LT

## 2025-02-14 RX ORDER — METHYLPREDNISOLONE ACETATE 80 MG/ML
80 INJECTION, SUSPENSION INTRA-ARTICULAR; INTRALESIONAL; INTRAMUSCULAR; SOFT TISSUE ONCE
Status: COMPLETED | OUTPATIENT
Start: 2025-02-14 | End: 2025-02-14

## 2025-02-14 RX ORDER — METHYLPREDNISOLONE ACETATE 40 MG/ML
20 INJECTION, SUSPENSION INTRA-ARTICULAR; INTRALESIONAL; INTRAMUSCULAR; SOFT TISSUE ONCE
Status: DISCONTINUED | OUTPATIENT
Start: 2025-02-14 | End: 2025-02-14

## 2025-02-14 RX ADMIN — METHYLPREDNISOLONE ACETATE 80 MG: 80 INJECTION, SUSPENSION INTRA-ARTICULAR; INTRALESIONAL; INTRAMUSCULAR; SOFT TISSUE at 11:15

## 2025-02-19 ENCOUNTER — DOCUMENTATION (OUTPATIENT)
Dept: ORTHOPEDICS | Facility: CLINIC | Age: 65
End: 2025-02-19
Payer: COMMERCIAL

## 2025-02-19 NOTE — PROGRESS NOTES
Order Request Summary   Health Plan:  [employer] ([employer])  Scheduled Date of Service:  2/17/2025            Order ID: [id number]         Authorized    Approval Valid Through: 02/17/2025 - 05/17/2025       This order is not a guarantee of payment except when required by applicable law. When applicable law allows, payment is subject to the member's active enrollment, benefit limitation and other terms of the member's contract at the time of services provided.               Member Information:  YANET ELLIOTT  Member #: [id number]  [address] [place] [id number]  YOB: 1960  Phone: [phone] Ordering Provider:   KARIS HAWKINS  [address] [place] [address]  Phone: [phone]  Fax: [phone]  NPI: [id number]  Servicing Provider:      [facility]  [address] [place] [address]  Phone: [phone]  Fax:  NPI: [id number]  TIN: [id number]                 The information below was obtained from the Ordering Provider and has not been independently verified by [facility] Medical Benefits Management. [facility] assumes no responsibility for the accuracy of this information or for its consistency with the patient's medical record.   Please call [phone] for all Urgent Requests.

## 2025-03-03 NOTE — PROGRESS NOTES
[facility]     Patient ID: Nidia Hopkins is a 64 y.o. female.  1960      Chief Complaint: Left shoulder pain and stiffness     HPI: She does have a history of calcific tendinitis of her left shoulder that I treated back in November 2021.  She received conservative care without surgery at that time. She states she fell on 12/14/24 onto her right shoulder and has been complaining of weakness and pain since. She has a history of a calcification removal under ultrasound in this shoulder. She has done physician guided exercises and OTC analgesics providing her with minimal relief in her symptoms.  At the last visit I injected her and recommended an MRI to assess the possibility of rotator cuff tearing.  The MRI was completed on 2/26/2025 revealing a partial-thickness tear at the attachment footplate of the supraspinatus tendon.  This tear is increased in size somewhat since the prior study in 2001.  No full-thickness tear of the supraspinatus tendon.  Tendinosis of the supraspinatus and infraspinatus tendons.  Review of Systems:  Review of systems negative except as stated in above HPI.           Vitals   There were no vitals filed for this visit.     There is no height or weight on file to calculate BMI.     Physical Exam: Bilateral shoulders were examined and compared.  Her decreased range of motion in the left shoulder both actively and passively is in forward flexion and abduction but today her external rotation was quite good and I would say normal compared to the opposite side.  She does have pain with both Neer and Calderon.  Her rotator cuff strength is limited secondary to discomfort.     Imaging: MRI report of the left shoulder from 2/26/2025 reveals 1 partial-thickness tear at the attachment footplate of the supraspinatus tendon.  This tear is increased in size somewhat since the prior study.  No full-thickness tear of the supraspinatus tendon neck space to tendinosis of the  supraspinatus and infraspinatus.  Acromioclavicular joint arthrosis and mild impingement.  It tendinosis and possible partial thickness tear of the subscapularis tendon.  Mild atrophy of the supraspinatus and subscapularis muscles.        X-rays dating as far back as 9/16/2020 one of the left shoulder were consistent with a calcific tendinitis but no other abnormalities. X-rays reviewed from today reveal a spur on the inferior aspect of her glenoid. The acromioclavicular and glenohumeral joint reveals no significant degenerative change.      Assessment/Plan: Left shoulder impingement and stiffness.  This is not a  frozen shoulder in the sense that the stiffness is not global and not in all directions.  She has excellent external rotation.  I do believe she has room for improvement with physical therapy.  We already gave her an injection.  If she does well with conservative care there is no need for surgery and if not they will be on the table.  I will see her back here in 6 weeks unless she is all better    Kingston Durant MD

## 2025-03-11 ENCOUNTER — OFFICE VISIT (OUTPATIENT)
Dept: ORTHOPEDICS | Facility: CLINIC | Age: 65
End: 2025-03-11
Payer: COMMERCIAL

## 2025-03-11 DIAGNOSIS — M75.42 ROTATOR CUFF IMPINGEMENT SYNDROME OF LEFT SHOULDER: ICD-10-CM

## 2025-03-11 DIAGNOSIS — M75.42 ROTATOR CUFF IMPINGEMENT SYNDROME OF LEFT SHOULDER: Primary | ICD-10-CM

## 2025-03-11 DIAGNOSIS — M75.32 CALCIFIC TENDINITIS OF LEFT SHOULDER: Primary | ICD-10-CM

## 2025-03-11 DIAGNOSIS — M75.32 CALCIFIC TENDINITIS OF LEFT SHOULDER: ICD-10-CM

## 2025-03-11 PROCEDURE — 99214 OFFICE O/P EST MOD 30 MIN: CPT | Performed by: ORTHOPAEDIC SURGERY

## 2025-03-18 DIAGNOSIS — F41.9 ANXIETY: ICD-10-CM

## 2025-03-18 RX ORDER — LORAZEPAM 0.5 MG/1
0.5 TABLET ORAL EVERY 8 HOURS PRN
Qty: 60 TABLET | Refills: 0 | Status: SHIPPED | OUTPATIENT
Start: 2025-03-18

## 2025-05-10 ENCOUNTER — PATIENT MESSAGE (OUTPATIENT)
Dept: FAMILY MEDICINE CLINIC | Facility: CLINIC | Age: 65
End: 2025-05-10

## 2025-05-10 DIAGNOSIS — E03.8 OTHER SPECIFIED HYPOTHYROIDISM: Primary | ICD-10-CM

## 2025-05-16 ENCOUNTER — RESULTS FOLLOW-UP (OUTPATIENT)
Dept: FAMILY MEDICINE CLINIC | Facility: CLINIC | Age: 65
End: 2025-05-16

## 2025-05-16 LAB
T4 SERPL-MCNC: 10.7 UG/DL (ref 4.5–12)
TSH SERPL DL<=0.005 MIU/L-ACNC: 1.08 UIU/ML (ref 0.45–4.5)

## 2025-05-27 DIAGNOSIS — E06.3 HYPOTHYROIDISM DUE TO HASHIMOTO'S THYROIDITIS: ICD-10-CM

## 2025-05-28 RX ORDER — LEVOTHYROXINE SODIUM 100 MCG
100 TABLET ORAL
Qty: 90 TABLET | Refills: 0 | Status: SHIPPED | OUTPATIENT
Start: 2025-05-28

## 2025-05-28 NOTE — TELEPHONE ENCOUNTER
Patient needs an appointment. Please contact the patient to schedule an appointment. Last office visit: 8/21/24

## 2025-07-22 ENCOUNTER — OFFICE VISIT (OUTPATIENT)
Dept: FAMILY MEDICINE CLINIC | Facility: CLINIC | Age: 65
End: 2025-07-22
Payer: COMMERCIAL

## 2025-07-22 VITALS
SYSTOLIC BLOOD PRESSURE: 128 MMHG | DIASTOLIC BLOOD PRESSURE: 78 MMHG | HEART RATE: 73 BPM | WEIGHT: 184 LBS | BODY MASS INDEX: 32.59 KG/M2 | OXYGEN SATURATION: 99 %

## 2025-07-22 DIAGNOSIS — R52 PAIN: ICD-10-CM

## 2025-07-22 DIAGNOSIS — F51.01 PRIMARY INSOMNIA: ICD-10-CM

## 2025-07-22 DIAGNOSIS — E03.9 HYPOTHYROIDISM, UNSPECIFIED TYPE: ICD-10-CM

## 2025-07-22 DIAGNOSIS — J45.20 MILD INTERMITTENT ASTHMA WITHOUT COMPLICATION: ICD-10-CM

## 2025-07-22 DIAGNOSIS — E06.3 HYPOTHYROIDISM DUE TO HASHIMOTO'S THYROIDITIS: ICD-10-CM

## 2025-07-22 DIAGNOSIS — Z00.00 WELLNESS EXAMINATION: Primary | ICD-10-CM

## 2025-07-22 DIAGNOSIS — I48.0 PAROXYSMAL ATRIAL FIBRILLATION (HCC): ICD-10-CM

## 2025-07-22 DIAGNOSIS — I10 PRIMARY HYPERTENSION: ICD-10-CM

## 2025-07-22 PROCEDURE — 99214 OFFICE O/P EST MOD 30 MIN: CPT | Performed by: FAMILY MEDICINE

## 2025-07-22 PROCEDURE — 99396 PREV VISIT EST AGE 40-64: CPT | Performed by: FAMILY MEDICINE

## 2025-07-22 RX ORDER — LEVOTHYROXINE SODIUM 100 MCG
100 TABLET ORAL
Qty: 90 TABLET | Refills: 3 | Status: SHIPPED | OUTPATIENT
Start: 2025-07-22

## 2025-07-22 RX ORDER — PREDNISONE 20 MG/1
TABLET ORAL
Qty: 15 TABLET | Refills: 0 | Status: SHIPPED | OUTPATIENT
Start: 2025-07-22

## 2025-07-22 RX ORDER — ZOLPIDEM TARTRATE 10 MG/1
10 TABLET ORAL
Qty: 30 TABLET | Refills: 2 | Status: SHIPPED | OUTPATIENT
Start: 2025-07-22

## 2025-07-22 NOTE — PROGRESS NOTES
Adult Annual Physical  Name: Cece Massey      : 1960      MRN: [id number]  Encounter Provider: Mickey Majano MD  Encounter Date: 2025   Encounter department: [facility]    :  Assessment & Plan  Wellness examination         Primary hypertension  Stable on current medication regimen. Patient to continue prescribed medication.       Orders:    CBC and differential    Comprehensive metabolic panel    Lipid Panel with Direct LDL reflex    TSH, 3rd generation    T4, free    Paroxysmal atrial fibrillation (HCC)  resoved         Hypothyroidism, unspecified type  Stable on current medication regimen. Patient to continue prescribed medication.            Hypothyroidism due to Hashimoto's thyroiditis    Orders:    Synthroid 100 MCG tablet; Take 1 tablet (100 mcg total) by mouth daily in the early morning    CBC and differential    Comprehensive metabolic panel    Lipid Panel with Direct LDL reflex    TSH, 3rd generation    T4, free    Primary insomnia    Orders:    zolpidem (AMBIEN) 10 mg tablet; Take 1 tablet (10 mg total) by mouth daily at bedtime as needed for sleep    Mild intermittent asthma without complication         Pain    Orders:    predniSONE 20 mg tablet; TAKE 1 TABLET BID X 5 DAYS THEN TAKE 1 TABLET QD FOR 5 DAYS        Preventive Screenings:    - Breast cancer screening: screening up-to-date     Immunizations:  - Immunizations due: Prevnar 20 and Zoster (Shingrix)         History of Present Illness     Adult Annual Physical:  Patient presents for annual physical.     Diet and Physical Activity:  - Diet/Nutrition: heart healthy (low sodium) diet, low fat diet and low carb diet.  - Exercise: walking, moderate cardiovascular exercise, 5-7 times a week on average and 30-60 minutes on average.    Depression Screening:  - PHQ-2 Score: 0    General Health:  - Sleep: sleeps poorly and 4-6 hours of sleep on average.  - Hearing: normal hearing bilateral ears.  - Vision:  wears glasses.  - Dental: regular dental visits, brushes teeth twice daily and floss regularly.    /GYN Health:  - Follows with GYN: yes.   - Menopause: postmenopausal.   - History of STDs: no  - Contraception: hysterectomy.      Advanced Care Planning:  - Has an advanced directive?: yes    - Has a durable medical POA?: yes      Review of Systems   Constitutional:  Negative for fatigue, fever and unexpected weight change.   HENT:  Negative for congestion, sinus pain and sore throat.    Eyes:  Negative for visual disturbance.   Respiratory:  Negative for shortness of breath and wheezing.    Cardiovascular:  Negative for chest pain and palpitations.   Gastrointestinal:  Negative for abdominal pain, nausea and vomiting.   Musculoskeletal: Negative.  Negative for arthralgias and myalgias.   Neurological:  Negative for syncope, weakness and numbness.   Psychiatric/Behavioral: Negative.  Negative for confusion, dysphoric mood and suicidal ideas.          Objective   /78 (BP Location: Left arm, Patient Position: Sitting, Cuff Size: Standard)   Pulse 73   Wt 83.5 kg (184 lb)   LMP  (LMP Unknown)   SpO2 99%   BMI 32.59 kg/m²     Physical Exam  Vitals and nursing note reviewed.   Constitutional:       General: She is not in acute distress.     Appearance: She is well-developed.   HENT:      Head: Normocephalic and atraumatic.     Eyes:      Conjunctiva/sclera: Conjunctivae normal.       Cardiovascular:      Rate and Rhythm: Normal rate and regular rhythm.      Heart sounds: No murmur heard.  Pulmonary:      Effort: Pulmonary effort is normal. No respiratory distress.      Breath sounds: Normal breath sounds.   Abdominal:      Palpations: Abdomen is soft.      Tenderness: There is no abdominal tenderness.     Musculoskeletal:         General: No swelling.      Cervical back: Neck supple.     Skin:     General: Skin is warm and dry.      Capillary Refill: Capillary refill takes less than 2 seconds.      Neurological:      Mental Status: She is alert.     Psychiatric:         Mood and Affect: Mood normal.

## 2025-07-22 NOTE — ASSESSMENT & PLAN NOTE
Stable on current medication regimen. Patient to continue prescribed medication.       Orders:    CBC and differential    Comprehensive metabolic panel    Lipid Panel with Direct LDL reflex    TSH, 3rd generation    T4, free

## 2025-08-05 ENCOUNTER — APPOINTMENT (OUTPATIENT)
Age: 65
End: 2025-08-05
Payer: COMMERCIAL

## 2025-08-05 DIAGNOSIS — M54.16 LUMBAR RADICULOPATHY: ICD-10-CM

## 2025-08-05 DIAGNOSIS — N18.9 CHRONIC KIDNEY DISEASE, UNSPECIFIED CKD STAGE: ICD-10-CM

## 2025-08-05 LAB
BUN SERPL-MCNC: 18 MG/DL (ref 5–25)
CREAT SERPL-MCNC: 0.79 MG/DL (ref 0.6–1.3)
GFR SERPL CREATININE-BSD FRML MDRD: 79 ML/MIN/1.73SQ M

## 2025-08-05 PROCEDURE — 84520 ASSAY OF UREA NITROGEN: CPT

## 2025-08-05 PROCEDURE — 36415 COLL VENOUS BLD VENIPUNCTURE: CPT

## 2025-08-05 PROCEDURE — 82565 ASSAY OF CREATININE: CPT

## 2025-08-07 ENCOUNTER — HOSPITAL ENCOUNTER (OUTPATIENT)
Age: 65
End: 2025-08-07
Attending: PSYCHIATRY & NEUROLOGY
Payer: COMMERCIAL

## 2025-08-20 ENCOUNTER — OFFICE VISIT (OUTPATIENT)
Dept: FAMILY MEDICINE CLINIC | Facility: CLINIC | Age: 65
End: 2025-08-20
Payer: COMMERCIAL

## 2025-08-20 VITALS — WEIGHT: 186 LBS | BODY MASS INDEX: 32.95 KG/M2 | DIASTOLIC BLOOD PRESSURE: 78 MMHG | SYSTOLIC BLOOD PRESSURE: 126 MMHG

## 2025-08-20 DIAGNOSIS — L20.82 FLEXURAL ECZEMA: Primary | ICD-10-CM

## 2025-08-20 DIAGNOSIS — L57.0 ACTINIC KERATOSES: ICD-10-CM

## 2025-08-20 PROCEDURE — 99213 OFFICE O/P EST LOW 20 MIN: CPT | Performed by: FAMILY MEDICINE

## 2025-08-20 RX ORDER — TRIAMCINOLONE ACETONIDE 5 MG/G
CREAM TOPICAL 3 TIMES DAILY
Qty: 30 G | Refills: 3 | Status: SHIPPED | OUTPATIENT
Start: 2025-08-20

## (undated) DEVICE — GLOVE SZ 8.5 PROTEXIS PI

## (undated) DEVICE — MANIFOLD SINGLE PORT NEPTUNE

## (undated) DEVICE — MANIFOLD FOUR PORT NEPTUNE

## (undated) DEVICE — HOOD FLYTE SURGICOOL

## (undated) DEVICE — SUCTION 18FR FRAZIER DISPOSABLE

## (undated) DEVICE — PENEVAC1 NONSTICK SMOKE EVAC

## (undated) DEVICE — SYSTEM LABELING CORRECT MEDICATION

## (undated) DEVICE — SOLN IRRIG STER WATER 1000ML

## (undated) DEVICE — TUBE SUCTION 1/4INX20FT STERILE

## (undated) DEVICE — SUCTION KAMVAC MINI 20/BX

## (undated) DEVICE — DRAPE POUCH IRRIGATION

## (undated) DEVICE — COUNTER NEEDLE FOAM BLOCK 1840

## (undated) DEVICE — SYRINGE DISP LUER-LOK 30 CC

## (undated) DEVICE — GOWN SIRUS POLYRNF BRTHSLV XL 30/CS

## (undated) DEVICE — POUCH INSTRUMENT 7X11 3-4

## (undated) DEVICE — SUTURE 4-0 30 X 30CM MONOCRYL PLUS STRATFIX SPIRAL

## (undated) DEVICE — SOLN IV NSS 0.9% 500ML

## (undated) DEVICE — BURR CARBIDE OVAL 4.0MM

## (undated) DEVICE — CLOTH PREPPING SAGE 2% CHG 2/PK

## (undated) DEVICE — SUTURE POLYSORB 3-0 UNDYED 5X18 V-20 D-TACH

## (undated) DEVICE — SHEET DRAPE 3/4 REVERSEFOLD 53X77

## (undated) DEVICE — VEST STERILE

## (undated) DEVICE — COVER BACK TABLE REINFORCED

## (undated) DEVICE — NEEDLE DISP HYPO 22GX1IN

## (undated) DEVICE — WRAP COBAN LATEX FREE 6IN STERILE

## (undated) DEVICE — SUTURE POLYSORB 0 UNDYED 3X18 HOS-12 D-TACH

## (undated) DEVICE — PACK UNIVERSAL TOTAL JOINT

## (undated) DEVICE — PAD GROUND ELECTROSURGICAL W/CORD

## (undated) DEVICE — APPLICATOR CHLORAPREP 26ML ORANGE TINT

## (undated) DEVICE — BLADE SAGITTAL DUAL CUT 4118-137-90

## (undated) DEVICE — DRAPE C-ARM 42X72 INCH

## (undated) DEVICE — SET HANDPIECE INTERPULSE

## (undated) DEVICE — SUTURE QUILL 2 PDO RX-2066Q

## (undated) DEVICE — PACK RFID HIP ADD ON

## (undated) DEVICE — TOWEL DISPOSABLE OR

## (undated) DEVICE — GLOVE SZ 8 LINER PROTEXIS PI BL

## (undated) DEVICE — GLOVE SZ 7.5 PROTEXIS PI

## (undated) DEVICE — COVER MAYO STAND ST 30/CS

## (undated) DEVICE — ADHESIVE SKIN DERMABOND ADVANCED 0.7ML